# Patient Record
Sex: MALE | Race: WHITE | Employment: FULL TIME | URBAN - METROPOLITAN AREA
[De-identification: names, ages, dates, MRNs, and addresses within clinical notes are randomized per-mention and may not be internally consistent; named-entity substitution may affect disease eponyms.]

---

## 2018-01-12 ENCOUNTER — HOSPITAL ENCOUNTER (EMERGENCY)
Age: 58
Discharge: HOME | End: 2018-01-12
Payer: COMMERCIAL

## 2018-01-12 VITALS
SYSTOLIC BLOOD PRESSURE: 172 MMHG | DIASTOLIC BLOOD PRESSURE: 107 MMHG | OXYGEN SATURATION: 98 % | BODY MASS INDEX: 21 KG/M2 | HEART RATE: 69 BPM | TEMPERATURE: 99.7 F | WEIGHT: 150 LBS | HEIGHT: 71 IN | RESPIRATION RATE: 17 BRPM

## 2018-01-12 DIAGNOSIS — T78.40XA ALLERGIC REACTION, INITIAL ENCOUNTER: Primary | ICD-10-CM

## 2018-01-12 PROCEDURE — 6360000002 HC RX W HCPCS: Performed by: PHYSICIAN ASSISTANT

## 2018-01-12 PROCEDURE — 99282 EMERGENCY DEPT VISIT SF MDM: CPT

## 2018-01-12 PROCEDURE — S0028 INJECTION, FAMOTIDINE, 20 MG: HCPCS | Performed by: PHYSICIAN ASSISTANT

## 2018-01-12 PROCEDURE — 96374 THER/PROPH/DIAG INJ IV PUSH: CPT

## 2018-01-12 PROCEDURE — 2580000003 HC RX 258: Performed by: PHYSICIAN ASSISTANT

## 2018-01-12 PROCEDURE — 2500000003 HC RX 250 WO HCPCS: Performed by: PHYSICIAN ASSISTANT

## 2018-01-12 PROCEDURE — 96375 TX/PRO/DX INJ NEW DRUG ADDON: CPT

## 2018-01-12 RX ORDER — SODIUM CHLORIDE 9 MG/ML
INJECTION, SOLUTION INTRAVENOUS CONTINUOUS
Status: DISCONTINUED | OUTPATIENT
Start: 2018-01-12 | End: 2018-01-12 | Stop reason: HOSPADM

## 2018-01-12 RX ORDER — METHYLPREDNISOLONE SODIUM SUCCINATE 125 MG/2ML
125 INJECTION, POWDER, LYOPHILIZED, FOR SOLUTION INTRAMUSCULAR; INTRAVENOUS ONCE
Status: COMPLETED | OUTPATIENT
Start: 2018-01-12 | End: 2018-01-12

## 2018-01-12 RX ORDER — DIPHENHYDRAMINE HYDROCHLORIDE 50 MG/ML
25 INJECTION INTRAMUSCULAR; INTRAVENOUS ONCE
Status: COMPLETED | OUTPATIENT
Start: 2018-01-12 | End: 2018-01-12

## 2018-01-12 RX ORDER — PREDNISONE 20 MG/1
20 TABLET ORAL DAILY
Qty: 5 TABLET | Refills: 0 | Status: SHIPPED | OUTPATIENT
Start: 2018-01-12 | End: 2018-01-17

## 2018-01-12 RX ORDER — CETIRIZINE HYDROCHLORIDE 10 MG/1
10 TABLET ORAL DAILY
Qty: 7 TABLET | Refills: 0 | Status: SHIPPED | OUTPATIENT
Start: 2018-01-12 | End: 2018-01-19

## 2018-01-12 RX ADMIN — FAMOTIDINE 20 MG: 10 INJECTION, SOLUTION INTRAVENOUS at 06:51

## 2018-01-12 RX ADMIN — DIPHENHYDRAMINE HYDROCHLORIDE 25 MG: 50 INJECTION, SOLUTION INTRAMUSCULAR; INTRAVENOUS at 06:52

## 2018-01-12 RX ADMIN — METHYLPREDNISOLONE SODIUM SUCCINATE 125 MG: 125 INJECTION, POWDER, FOR SOLUTION INTRAMUSCULAR; INTRAVENOUS at 06:51

## 2018-01-12 RX ADMIN — SODIUM CHLORIDE: 9 INJECTION, SOLUTION INTRAVENOUS at 06:52

## 2018-01-12 ASSESSMENT — ENCOUNTER SYMPTOMS
RHINORRHEA: 0
CONSTIPATION: 0
ABDOMINAL PAIN: 0
COLOR CHANGE: 0
EYE DISCHARGE: 0
SHORTNESS OF BREATH: 0
ABDOMINAL DISTENTION: 0
SORE THROAT: 0

## 2018-01-12 NOTE — ED PROVIDER NOTES
allergic reaction as well as sinus and nasal congestion along with prednisone patient was advised to try to by process of elimination think of what is different for him because he allergic reaction is advised that if he continues to come in contact with whatever was causing the reaction after he was off the medications he may begin to react again. He is also advised to follow-up with his regular family physician and return to the emergency department if his symptoms become worse any signs of dizziness shortness of breath or worsening rash. CRITICAL CARE TIME   Total Critical Care time was 0 minutes, excluding separately reportable procedures. There was a high probability of clinically significant/life threatening deterioration in the patient's condition which required my urgent intervention. CONSULTS:  None    PROCEDURES:  Unless otherwise noted below, none     Procedures    FINAL IMPRESSION      1.  Allergic reaction, initial encounter          DISPOSITION/PLAN   DISPOSITION Decision To Discharge 01/12/2018 08:26:31 AM      PATIENT REFERRED TO:  Luke Abbott MD  [address] [facility]  [phone]    In 2 days      Arthur Jeffrey MD  [place] [address]  [phone]    In 3 days        DISCHARGE MEDICATIONS:  New Prescriptions    CETIRIZINE (ZYRTEC) 10 MG TABLET    Take 1 tablet by mouth daily for 7 doses    PREDNISONE (DELTASONE) 20 MG TABLET    Take 1 tablet by mouth daily for 5 doses          (Please note that portions of this note were completed with a voice recognition program.  Efforts were made to edit the dictations but occasionally words are mis-transcribed.)    Evelina Oliver PA-C (electronically signed)  Attending Emergency Physician         Evelina Oliver PA-C  01/12/18 2641

## 2019-02-05 NOTE — ED TRIAGE NOTES
Pt states yesterday he woke up with swollen lips, took a Benadryl yesterday and felt a little better. Pt states this morning he woke up with swollen lips, swollen eyes and a rash all over his body. Rash appears to be large red hives that pt states is itchy. Pt denies difficulty breathing, talking, swallowing. Pt denies swelling in mouth or tongue. Pt alert and oriented, skin p/w/d, respirations even and unlabored. No distress noted in triage.
Unable to assess

## 2022-02-08 ENCOUNTER — HOSPITAL ENCOUNTER (INPATIENT)
Age: 62
LOS: 5 days | Discharge: HOME | DRG: 897 | End: 2022-02-14
Attending: PSYCHIATRY & NEUROLOGY | Admitting: PSYCHIATRY & NEUROLOGY
Payer: COMMERCIAL

## 2022-02-08 DIAGNOSIS — F19.94 SUBSTANCE INDUCED MOOD DISORDER (HCC): Primary | ICD-10-CM

## 2022-02-08 LAB
ACETAMINOPHEN LEVEL: <5 UG/ML (ref 10–30)
ALBUMIN SERPL-MCNC: 4.6 G/DL (ref 3.5–4.6)
ALP BLD-CCNC: 95 U/L (ref 35–104)
ALT SERPL-CCNC: 49 U/L (ref 0–41)
AMPHETAMINE SCREEN, URINE: ABNORMAL
ANION GAP SERPL CALCULATED.3IONS-SCNC: 16 MEQ/L (ref 9–15)
AST SERPL-CCNC: 89 U/L (ref 0–40)
BACTERIA: NEGATIVE /HPF
BARBITURATE SCREEN URINE: ABNORMAL
BASOPHILS ABSOLUTE: 0 K/UL (ref 0–0.2)
BASOPHILS RELATIVE PERCENT: 0.5 %
BENZODIAZEPINE SCREEN, URINE: ABNORMAL
BILIRUB SERPL-MCNC: 0.6 MG/DL (ref 0.2–0.7)
BILIRUBIN URINE: NEGATIVE
BLOOD, URINE: NEGATIVE
BUN BLDV-MCNC: 14 MG/DL (ref 6–20)
CALCIUM SERPL-MCNC: 9.8 MG/DL (ref 8.5–9.9)
CANNABINOID SCREEN URINE: POSITIVE
CHLORIDE BLD-SCNC: 96 MEQ/L (ref 95–107)
CHOLESTEROL, TOTAL: 246 MG/DL (ref 0–199)
CK MB: 10.5 NG/ML (ref 0–6.7)
CLARITY: CLEAR
CO2: 24 MEQ/L (ref 20–31)
COCAINE METABOLITE SCREEN URINE: ABNORMAL
COLOR: YELLOW
CREAT SERPL-MCNC: 0.69 MG/DL (ref 0.7–1.2)
CREATINE KINASE-MB INDEX: 1.4 % (ref 0–3.5)
EOSINOPHILS ABSOLUTE: 0.1 K/UL (ref 0–0.7)
EOSINOPHILS RELATIVE PERCENT: 0.8 %
EPITHELIAL CELLS, UA: ABNORMAL /HPF (ref 0–5)
ETHANOL PERCENT: 0.05 G/DL
ETHANOL PERCENT: 0.09 G/DL
ETHANOL: 106 MG/DL (ref 0–0.08)
ETHANOL: 58 MG/DL (ref 0–0.08)
GFR AFRICAN AMERICAN: >60
GFR NON-AFRICAN AMERICAN: >60
GLOBULIN: 2.9 G/DL (ref 2.3–3.5)
GLUCOSE BLD-MCNC: 110 MG/DL (ref 70–99)
GLUCOSE URINE: NEGATIVE MG/DL
HCT VFR BLD CALC: 45.1 % (ref 42–52)
HDLC SERPL-MCNC: 78 MG/DL (ref 40–59)
HEMOGLOBIN: 15.2 G/DL (ref 14–18)
HYALINE CASTS: ABNORMAL /HPF (ref 0–5)
KETONES, URINE: >=80 MG/DL
LDL CHOLESTEROL CALCULATED: 138 MG/DL (ref 0–129)
LEUKOCYTE ESTERASE, URINE: NEGATIVE
LYMPHOCYTES ABSOLUTE: 2.2 K/UL (ref 1–4.8)
LYMPHOCYTES RELATIVE PERCENT: 28.9 %
Lab: ABNORMAL
MCH RBC QN AUTO: 33.4 PG (ref 27–31.3)
MCHC RBC AUTO-ENTMCNC: 33.8 % (ref 33–37)
MCV RBC AUTO: 99 FL (ref 80–100)
METHADONE SCREEN, URINE: ABNORMAL
MONOCYTES ABSOLUTE: 1.1 K/UL (ref 0.2–0.8)
MONOCYTES RELATIVE PERCENT: 14.8 %
NEUTROPHILS ABSOLUTE: 4.3 K/UL (ref 1.4–6.5)
NEUTROPHILS RELATIVE PERCENT: 55 %
NITRITE, URINE: NEGATIVE
OPIATE SCREEN URINE: ABNORMAL
OXYCODONE URINE: ABNORMAL
PDW BLD-RTO: 14.8 % (ref 11.5–14.5)
PH UA: 5.5 (ref 5–9)
PHENCYCLIDINE SCREEN URINE: ABNORMAL
PLATELET # BLD: 159 K/UL (ref 130–400)
POTASSIUM SERPL-SCNC: 3.4 MEQ/L (ref 3.4–4.9)
PROPOXYPHENE SCREEN: ABNORMAL
PROTEIN UA: 30 MG/DL
RBC # BLD: 4.56 M/UL (ref 4.7–6.1)
RBC UA: ABNORMAL /HPF (ref 0–5)
SALICYLATE, SERUM: <0.3 MG/DL (ref 15–30)
SARS-COV-2, NAAT: NOT DETECTED
SODIUM BLD-SCNC: 136 MEQ/L (ref 135–144)
SPECIFIC GRAVITY UA: 1.03 (ref 1–1.03)
TOTAL CK: 765 U/L (ref 0–190)
TOTAL PROTEIN: 7.5 G/DL (ref 6.3–8)
TRIGL SERPL-MCNC: 149 MG/DL (ref 0–150)
TSH SERPL DL<=0.05 MIU/L-ACNC: 1.3 UIU/ML (ref 0.44–3.86)
URINE REFLEX TO CULTURE: ABNORMAL
UROBILINOGEN, URINE: 1 E.U./DL
WBC # BLD: 7.8 K/UL (ref 4.8–10.8)
WBC UA: ABNORMAL /HPF (ref 0–5)

## 2022-02-08 PROCEDURE — 36415 COLL VENOUS BLD VENIPUNCTURE: CPT

## 2022-02-08 PROCEDURE — 80061 LIPID PANEL: CPT

## 2022-02-08 PROCEDURE — 85025 COMPLETE CBC W/AUTO DIFF WBC: CPT

## 2022-02-08 PROCEDURE — 82550 ASSAY OF CK (CPK): CPT

## 2022-02-08 PROCEDURE — 80053 COMPREHEN METABOLIC PANEL: CPT

## 2022-02-08 PROCEDURE — 87635 SARS-COV-2 COVID-19 AMP PRB: CPT

## 2022-02-08 PROCEDURE — 99285 EMERGENCY DEPT VISIT HI MDM: CPT

## 2022-02-08 PROCEDURE — 81001 URINALYSIS AUTO W/SCOPE: CPT

## 2022-02-08 PROCEDURE — 80143 DRUG ASSAY ACETAMINOPHEN: CPT

## 2022-02-08 PROCEDURE — 80307 DRUG TEST PRSMV CHEM ANLYZR: CPT

## 2022-02-08 PROCEDURE — 84443 ASSAY THYROID STIM HORMONE: CPT

## 2022-02-08 PROCEDURE — 82553 CREATINE MB FRACTION: CPT

## 2022-02-08 PROCEDURE — 80179 DRUG ASSAY SALICYLATE: CPT

## 2022-02-08 PROCEDURE — 82077 ASSAY SPEC XCP UR&BREATH IA: CPT

## 2022-02-08 RX ORDER — LORAZEPAM 1 MG/1
2 TABLET ORAL ONCE
Status: COMPLETED | OUTPATIENT
Start: 2022-02-08 | End: 2022-02-09

## 2022-02-08 RX ORDER — LANOLIN ALCOHOL/MO/W.PET/CERES
100 CREAM (GRAM) TOPICAL ONCE
Status: COMPLETED | OUTPATIENT
Start: 2022-02-08 | End: 2022-02-09

## 2022-02-08 ASSESSMENT — ENCOUNTER SYMPTOMS
ABDOMINAL DISTENTION: 0
VOMITING: 0
ABDOMINAL PAIN: 0
NAUSEA: 0
COUGH: 1
ANAL BLEEDING: 0
APNEA: 0
EYE DISCHARGE: 0
VOICE CHANGE: 0
SHORTNESS OF BREATH: 0
BACK PAIN: 0

## 2022-02-09 PROBLEM — F19.94 SUBSTANCE INDUCED MOOD DISORDER (HCC): Status: ACTIVE | Noted: 2022-02-09

## 2022-02-09 PROCEDURE — 6370000000 HC RX 637 (ALT 250 FOR IP): Performed by: PSYCHIATRY & NEUROLOGY

## 2022-02-09 PROCEDURE — 6370000000 HC RX 637 (ALT 250 FOR IP): Performed by: PHYSICIAN ASSISTANT

## 2022-02-09 PROCEDURE — 99223 1ST HOSP IP/OBS HIGH 75: CPT | Performed by: PSYCHIATRY & NEUROLOGY

## 2022-02-09 PROCEDURE — 1240000000 HC EMOTIONAL WELLNESS R&B

## 2022-02-09 PROCEDURE — 93005 ELECTROCARDIOGRAM TRACING: CPT | Performed by: PSYCHIATRY & NEUROLOGY

## 2022-02-09 RX ORDER — ACETAMINOPHEN 325 MG/1
650 TABLET ORAL EVERY 4 HOURS PRN
Status: DISCONTINUED | OUTPATIENT
Start: 2022-02-09 | End: 2022-02-14 | Stop reason: HOSPADM

## 2022-02-09 RX ORDER — TRAZODONE HYDROCHLORIDE 50 MG/1
50 TABLET ORAL NIGHTLY PRN
Status: DISCONTINUED | OUTPATIENT
Start: 2022-02-09 | End: 2022-02-14 | Stop reason: HOSPADM

## 2022-02-09 RX ORDER — HYDROXYZINE HYDROCHLORIDE 50 MG/ML
50 INJECTION, SOLUTION INTRAMUSCULAR EVERY 6 HOURS PRN
Status: DISCONTINUED | OUTPATIENT
Start: 2022-02-09 | End: 2022-02-14 | Stop reason: HOSPADM

## 2022-02-09 RX ORDER — POLYETHYLENE GLYCOL 3350 17 G/17G
17 POWDER, FOR SOLUTION ORAL DAILY PRN
Status: DISCONTINUED | OUTPATIENT
Start: 2022-02-09 | End: 2022-02-14 | Stop reason: HOSPADM

## 2022-02-09 RX ORDER — LORAZEPAM 1 MG/1
2 TABLET ORAL
Status: DISCONTINUED | OUTPATIENT
Start: 2022-02-09 | End: 2022-02-10

## 2022-02-09 RX ORDER — HALOPERIDOL 5 MG/ML
5 INJECTION INTRAMUSCULAR EVERY 6 HOURS PRN
Status: DISCONTINUED | OUTPATIENT
Start: 2022-02-09 | End: 2022-02-14 | Stop reason: HOSPADM

## 2022-02-09 RX ORDER — HYDROXYZINE PAMOATE 50 MG/1
50 CAPSULE ORAL EVERY 6 HOURS PRN
Status: DISCONTINUED | OUTPATIENT
Start: 2022-02-09 | End: 2022-02-14 | Stop reason: HOSPADM

## 2022-02-09 RX ORDER — HALOPERIDOL 5 MG
5 TABLET ORAL EVERY 6 HOURS PRN
Status: DISCONTINUED | OUTPATIENT
Start: 2022-02-09 | End: 2022-02-14 | Stop reason: HOSPADM

## 2022-02-09 RX ORDER — LORAZEPAM 1 MG/1
3 TABLET ORAL
Status: DISCONTINUED | OUTPATIENT
Start: 2022-02-09 | End: 2022-02-10

## 2022-02-09 RX ORDER — NICOTINE 21 MG/24HR
1 PATCH, TRANSDERMAL 24 HOURS TRANSDERMAL DAILY
Status: DISCONTINUED | OUTPATIENT
Start: 2022-02-09 | End: 2022-02-14 | Stop reason: HOSPADM

## 2022-02-09 RX ORDER — MAGNESIUM HYDROXIDE/ALUMINUM HYDROXICE/SIMETHICONE 120; 1200; 1200 MG/30ML; MG/30ML; MG/30ML
30 SUSPENSION ORAL EVERY 6 HOURS PRN
Status: DISCONTINUED | OUTPATIENT
Start: 2022-02-09 | End: 2022-02-14 | Stop reason: HOSPADM

## 2022-02-09 RX ORDER — LORAZEPAM 2 MG/ML
0.5 INJECTION INTRAMUSCULAR
Status: DISCONTINUED | OUTPATIENT
Start: 2022-02-09 | End: 2022-02-10

## 2022-02-09 RX ORDER — FOLIC ACID 1 MG/1
1 TABLET ORAL DAILY
Status: DISCONTINUED | OUTPATIENT
Start: 2022-02-09 | End: 2022-02-14 | Stop reason: HOSPADM

## 2022-02-09 RX ORDER — LANOLIN ALCOHOL/MO/W.PET/CERES
100 CREAM (GRAM) TOPICAL DAILY
Status: DISCONTINUED | OUTPATIENT
Start: 2022-02-10 | End: 2022-02-14 | Stop reason: HOSPADM

## 2022-02-09 RX ORDER — LORAZEPAM 0.5 MG/1
0.5 TABLET ORAL
Status: DISCONTINUED | OUTPATIENT
Start: 2022-02-09 | End: 2022-02-10

## 2022-02-09 RX ORDER — MULTIVITAMIN WITH IRON
1 TABLET ORAL DAILY
Status: DISCONTINUED | OUTPATIENT
Start: 2022-02-09 | End: 2022-02-14 | Stop reason: HOSPADM

## 2022-02-09 RX ORDER — LORAZEPAM 1 MG/1
1 TABLET ORAL EVERY 4 HOURS PRN
Status: DISCONTINUED | OUTPATIENT
Start: 2022-02-09 | End: 2022-02-10

## 2022-02-09 RX ADMIN — THERA TABS 1 TABLET: TAB at 12:06

## 2022-02-09 RX ADMIN — LORAZEPAM 2 MG: 1 TABLET ORAL at 02:27

## 2022-02-09 RX ADMIN — LORAZEPAM 0.5 MG: 0.5 TABLET ORAL at 12:06

## 2022-02-09 RX ADMIN — Medication 100 MG: at 02:27

## 2022-02-09 RX ADMIN — FOLIC ACID 1 MG: 1 TABLET ORAL at 12:06

## 2022-02-09 ASSESSMENT — SLEEP AND FATIGUE QUESTIONNAIRES
RESTFUL SLEEP: YES
DIFFICULTY STAYING ASLEEP: YES
DO YOU HAVE DIFFICULTY SLEEPING: YES
SLEEP PATTERN: DIFFICULTY FALLING ASLEEP;DIFFICULTY ARISING
AVERAGE NUMBER OF SLEEP HOURS: 6
DIFFICULTY FALLING ASLEEP: YES
DO YOU USE A SLEEP AID: NO
DIFFICULTY ARISING: YES

## 2022-02-09 ASSESSMENT — LIFESTYLE VARIABLES: HISTORY_ALCOHOL_USE: YES

## 2022-02-09 NOTE — GROUP NOTE
Group Therapy Note    Date: 2/9/2022    Group Start Time: 1400  Group End Time: 1430  Group Topic: Psychoeducation    [facility]    DINAH Ivey        Group Therapy Note    Attendees: 11         Patient's Goal:  To participate in Psychoeducational group on sharing special talents. 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 Status After Intervention:  Improved    Participation Level:  Active Listener and Interactive    Participation Quality: Appropriate, Attentive and Sharing      Speech:  normal      Thought Process/Content: Logical      Affective Functioning: Congruent      Mood: Calm      Level of consciousness:  Alert and Attentive      Response to Learning: Able to verbalize current knowledge/experience      Endings: None Reported    Modes of Intervention: Education      Discipline Responsible: /Counselor      Signature:  DINAH Ivey

## 2022-02-09 NOTE — H&P
[facility] - Department of Psychiatry    History and Physical - Adult         CHIEF COMPLAINT:  Depression SI, alcoholism    History obtained from:  patient    Patient was seen after discussing with the treatment team and reviewing the chart        CIRCUMSTANCES OF ADMISSION:     65yo male BIB self for SI no plan. Patient reports stressors as relapsed in drinking, lost his job, and his x-wife  his best friend. Patient reports no plan \"Jagruti never had the thoughts of no living. \" H/O rehab and sober for 15yrs in the past. Patient affect is sad/flat, tearful at times. Patient denies HI and AVH. Endorses high depression and mild anxiety. On unit patient has been calm and cooperative    HISTORY OF PRESENT ILLNESS:      The patient is a 63 y.o. male engaged living with mahin, unemployed recently lost his job, with significant past history of alcoholism    Pt report that he has been feeling depressed and suicidal  Family came to him and asked him to seek help. Pt has been drinking a lot - about 3 of the 1/2 gallon Rum per week. Weekend drink from morning to night. When he was working he drank after work.   Pt started drinking 15 years ago after divorce on and off more so binging  Escalated to daily drinking for 2 years  Some days he goes without drinking  Has been to rehab in [facility] - 5 years ago  Pt is currently going through withdrawal  Never had any seizures    Depression Severity: Rating mood to be around 3/10 (10- good)  Quality:melancholic  Worse all day  Content: Hopeless, worthless and helpless feeling  Suicidal thoughts - passive death wishes- rather be dead, gets worse when he drink more  Associated symptoms:  Poor concentration, anhedonia, decrease motivation  Sleep and appetite- poor    Stressors:losing job, ex wife  his best friend  2 children - 32 and 23 y/o - no close contact, not seen grand kids for a while  The patient is not currently receiving care for the above psychiatric illness. Medications Prior to Admission:   No medications prior to admission. Compliance:no    Psychiatric Review of Systems       Depression: yes     Shirley or Hypomania:  no     Panic Attacks:  yes     Phobias:  no     Obsessions and Compulsions:  no     PTSD : no     Hallucinations:  no     Delusions:  Paranoid ideation    Substance Abuse History:  ETOH: yes as above  Marijuana: no  Opiates: no  Other Drugs: no      Past Psychiatric History:  Prior Diagnosis:  MDD, alcoholism  Psychiatrist: No   Therapist:no   Hospitalization: no  Hx of Suicidal Attempts: no  Hx of violence:  no  ECT: no  Previous discontinued Psychiatric Med Trials: no    Past Medical History:    History reviewed. No pertinent past medical history. Past Surgical History:    History reviewed. No pertinent surgical history. Allergies:   Patient has no known allergies. Family History  History reviewed. No pertinent family history. Social History:  Born and Raised[place]  Describes Childhood:   supportive  Education: [employer]  Employment: Laid off  Relationships:   Children: 2  Current Support: romantic partner    Legal Hx: none  Access to weapons?:  No      EXAMINATION:    REVIEW OF SYSTEMS:    ROS:  [x] All negative/unchanged except if checked.  Explain positive(checked items) below:  [] Constitutional  [] Eyes  [] Ear/Nose/Mouth/Throat  [] Respiratory  [] CV  [] GI  []   [] Musculoskeletal  [] Skin/Breast  [] Neurological  [] Endocrine  [] Heme/Lymph  [] Allergic/Immunologic    Explanation:     Vitals:  BP (!) 181/66   Pulse 69   Temp 97.8 °F (36.6 °C) (Oral)   Resp 18   Ht 5' 10\" (1.778 m)   Wt 163 lb (73.9 kg)   SpO2 97%   BMI 23.39 kg/m²      Neurologic Exam:   Muscle Strength & Tone: full ROM  Gait: normal gait   Involuntary Movements: Yes    Mental Status Examination:    Level of consciousness:  within normal limits   Appearance:  ill-appearing  Behavior/Motor:  psychomotor retardation  Attitude toward examiner:  cooperative  Speech:  slow   Mood: constricted, decreased range and depressed  Affect:  blunted  Thought processes:  slow   Thought content:  Suicidal Ideation:  passive  Delusions:  no evidence of delusions  Perceptual Disturbance:  denies any perceptual disturbance  Cognition:  oriented to person, place, and time   Concentration poor  Memory intact  Insight poor   Judgement poor   Fund of Knowledge adequate    Mini Mental Status 30/30      DIAGNOSIS:     Substance-induced (alc) mood disorder  Alcohol dependence in w/d   R/O MDD single episode severe          RISK ASSESSMENT:    SUICIDE RISK ASSESSMENT: high  HOMICIDE: low  AGITATION/VIOLENCE: low  ELOPEMENT: low    LABS: REVIEWED TODAY:  Recent Labs     02/08/22 2004   WBC 7.8   HGB 15.2        Recent Labs     02/08/22 2004      K 3.4   CL 96   CO2 24   BUN 14   CREATININE 0.69*   GLUCOSE 110*     Recent Labs     02/08/22 2004   BILITOT 0.6   ALKPHOS 95   AST 89*   ALT 49*     Lab Results   Component Value Date    LABAMPH Neg 02/08/2022    BARBSCNU Neg 02/08/2022    LABBENZ Neg 02/08/2022    LABMETH Neg 02/08/2022    OPIATESCREENURINE Neg 02/08/2022    PHENCYCLIDINESCREENURINE Neg 02/08/2022    ETOH 58 02/08/2022     Lab Results   Component Value Date    TSH 1.300 02/08/2022     No results found for: LITHIUM  No results found for: VALPROATE, CBMZ  No results found for: LITHIUM, VALPROATE    FURTHER LABS ORDERED :      Radiology   No results found. EKG: TRACING REVIEWED    TREATMENT PLAN:    Risk Management:  close watch and suicide risk    Collateral Information:  Will obtain collateral information from the family or friends. Will obtain medical records as appropriate from out patient providers  Will consult the hospitalist for a physical exam to rule out any co-morbid physical condition.     Home medication Reconciled       New Medications started during this admission :    See orders  Prn Haldol 5mg and Vistaril 50mg q6hr for extreme agitation. Trazodone as ordered for insomnia  Vistaril as ordered for anxiety  Discussed with the patient risk, benefit, alternative and common side effects for the  proposed medication treatment. Patient is consenting to the treatment.     Psychotherapy:   Encourage participation in milieu and group therapy  Individual therapy as needed      Electronically signed by Kashmir Owens MD on 2/9/2022 at 11:42 AM

## 2022-02-09 NOTE — CARE COORDINATION
FAMILY COLLATERAL NOTE    Family/Support Name: Sara Lopez  Contact #: [phone]  Relationship to Pt[de-identified] Girlfriend        Family/Support contact aware of hospitalization: Yes    Presenting Symptoms/Current Concerns:  Collateral said patient was drinking and threatening to kill himself since 2/3/2022. Collateral and patient's children brought patient to the emergency room. Top 3 Life Stressors:   Patient lost his job. Patient is not able to see his children when he drinks. Patient has is not good at handling stress. Background History Relevant to Current Hospitalization:  Patient was in rehab two years ago in which he was kept for 27 days. Patient was discharged after learning that his insurance would no longer pay. Patient also threatened two weeks ago to kill himself. Patient will quit drinking for 3 months and start again. Family Mental Health/Substance Use History:   Patient's brother has PTSD from serving in the war. Collateral said she was not aware of substance use history due to patient's parents both being . Support Network's Goal for Hospitalization: Collateral wants patient to go to rehab and get psychiatric help to learn how to deal with stuff and have a normal life. Patient does not have insurance according to collateral and may refuse to go to rehab. Discharge Plan: Patient should be discharged to a rehab facility and see a therapist or counselor. Patient needs to reconnect with Alcoholic Anonymous and attend meetings. Support Network Supportive of Discharge Plan: Yes    Support can confirm Safety of Location and Security of Weapons:   Collateral confirmed that there are no weapons in the home. Support agreeable to Safeguard and Monitor Medications (including Prescription and OTC):   Collateral confirmed that that there are no medications in the home.      Identified Barriers to Compliance with Discharge Plan: Patient does not have health insurance to cover stay in hospital or rehab. Recommendations for Support Network:   Patient should be discharged to rehab facility and see a therapist or counselor. Patient should also attend Alcoholic Anonymous meetings.     .Electronically signed by [address] on 2/9/2022 at 5:55 PM      [address]

## 2022-02-09 NOTE — ED NOTES
Appetite good for breakfast. Updated on treatment plan. Verbalizes understanding. Taking PO fluids fair.      Kimberley Hernandez RN  02/09/22 9656
Awaiting final disposition from Dr. Felix Haas has approved a 3WT indigent bed. Resting with eyes closed & no acute distress noted.      Glenys Galindo RN  02/09/22 0036
Breakfast tray given. Voices no complaints.      Milton Currie RN  02/09/22 9615
Corina De from [facility] called and per Corina De the pt has been accepted for an indigent bed on [address].     Shama Vera RN  02/09/22 2692
Mckenna De Dios (family member) [phone]     Justin Curtis RN  02/09/22 3149
[facility] notified for Patient belongings & 3 WT escort.      Kimberley Hernandez RN  02/09/22 1021
Patient changed into psychsafe clothing without incidence. Lab at bedside and draw without issue. Patient calm sitting in chair at this time. No distress noted.        Yolanda Piper RN  02/08/22 2041
Patient in bed, appears to be asleep, respirations even and unlabored. No s/s of distress noted.       Dorys Suh RN  02/09/22 5563
Patient resting quietly respirations are even and unlabored no distress noted at this time.        Elsa Hawley RN  02/09/22 0681
Provisional Diagnosis:      Depression with SI, Substance use d/o    Psychosocial and Contextual Factors:      Patient live with girlcade. Recently lost job and ex-wife  best friend. Per pt kids want \"Nothing to do with me\"  C-SSRS Summary:     Patient: Obey Wright Screening  1) Within the past month, have you wished you were dead or wished you could go to sleep and not wake up? : Yes  2) Have you actually had any thoughts of killing yourself? : Yes  6) Have you ever done anything, started to do anything, or prepared to do anything to end your life?: No    Family: none noted at bedside. Lives with Girlfriend. Agency: None          Abuse Assessment  Physical Abuse: Denies  Verbal Abuse: Denies  Emotional abuse: Denies  Financial Abuse: Denies  Sexual abuse: Denies  Elder abuse: No    Clinical Summary:      65yo male BIB self for SI no plan. Patient reports stressors as relapsed in drinking, lost his job, and his x-wife  his best friend. Patient reports no plan \"Jagruti never had the thoughts of no living. \" H/O rehab and sober for 15yrs in the past. Patient affect is sad/flat, tearful at times. Patient denies HI and AVH. Endorses high depression and mild anxiety. On unit patient has been calm and cooperative. BAL: 0.093  TOX + THC  QTC: 403      Level of Care Disposition:      Per Dr Breanna Elliott refer to dual diagnosis, preferring [facility].         Rafael Barillas RN  02/09/22 0140       Rafael Barillas RN  02/09/22 8991       Rafael Barillas RN  02/09/22 9821
Pt is quiet and cooperative with no problems and no C/O any kind expressed. Pt has no cough no respiratory distress and no SOB noted.      Floyd Paul RN  02/09/22 5229
normal...

## 2022-02-09 NOTE — CARE COORDINATION
Psychosocial Assessment    Current Level of Psychosocial Functioning     Independent X  Dependent    Minimal Assist     Comments:      Psychosocial High Risk Factors (check all that apply)    Unable to obtain meds   Chronic illness/pain    Substance abuse X  Lack of Family Support   Financial stress   Isolation   Inadequate Community Resources  Suicide attempt(s)  Not taking medications   Victim of crime   Developmental Delay  Unable to manage personal needs    Age 72 or older   Homeless  No transportation   Readmission within 30 days  Unemployment X  Traumatic Event    Family/Supports identified: Girlfriend and children. Sexual Orientation:  Heterosexual.    Patient Strengths: Communication, motivated, no significant physical illness and positive support. Patient Barriers: None reported. Safety plan: Completed. CMHC/MH history:   None reported. Plan of Care:    medication management, group/individual therapies, family meetings, psycho -education, treatment team meetings to assist with stabilization    Initial Discharge Plan:  Patient plans to return home, self detox and attend AA meetings. Clinical Summary:      Patient is a 63year old male that reported during  assessment that he had recently lost his job and started drinking again. Patient said he was not able to just have one drink. Patient said he is here because his children wanted him to get help. Patient said he plans to detox on his own once he is discharged. [facility] was discussed. Patient declined and said he has stopped drinking before and he can do it again. Patient plans to attend AA meeting upon being released. Patient stopped going to [address] due to Covid. During assessment, patient made good eye contact and was cooperative and friendly. Patient has the support of his girlfriend and his children. Collateral's phone number is needed.  Patient does not have his cell phone where his phone numbers are located. Safety plan was completed.        .Electronically signed by [address] on 2/9/2022 at 1:38 PM

## 2022-02-09 NOTE — GROUP NOTE
Group Therapy Note    Date: 2/9/2022    Group Start Time: 1500  Group End Time: 6907  Group Topic: Healthy Living/Wellness    [facility]    Julia Fontenot RN; Dot Birmingham RN        Group Therapy Note    Attendees: 10/16         Patient's Goal:  To be able to identify when feeling stressed and learn stress management techniques     Status After Intervention:  Improved    Participation Level:  Active Listener and Interactive    Participation Quality: Appropriate, Attentive and Sharing      Speech:  normal      Thought Process/Content: Logical  Linear      Affective Functioning: Blunted      Mood: euthymic      Level of consciousness:  Alert, Oriented x4 and Attentive      Response to Learning: Able to verbalize current knowledge/experience and Able to verbalize/acknowledge new learning      Endings: None Reported    Modes of Intervention: Education, Support and Problem-solving      Discipline Responsible: Registered Nurse      Signature:  Julia Fontenot RN

## 2022-02-09 NOTE — PROGRESS NOTES
Patient is put on the CIWA protocol scoring a 5 given 0.5 ativan due to patient being somewhat tremulous and shaky, some anxiety, high BP. Patient eats lunch and then goes to lay down in room. Patient will be monitored q.4h. on CIWA protocol.  Electronically signed by Hitesh Melton LPN on 3/3/7045 at 26:50 PM

## 2022-02-09 NOTE — CARE COORDINATION
Brief Intervention and Referral to Treatment Summary    Patient was provided PHQ-9, AUDIT and DAST Screening:      PHQ-9 Score: No score  AUDIT Score:  39  DAST Score:  0    Patients substance use is considered     Low Risk/Healthy  Moderate Risk  Harmful  Dependent X    Patients depression is considered:     Minimal X  Mild   Moderate  Moderately Severe  Severe    Brief Education Was Provided N/A    Patient was receptive  Patient was not receptive      Brief Intervention Is Provided (Only for AUDIT or DAST)     Patient reports readiness to decrease and/or stop use and a plan was discussed   Patient denies readiness to decrease and/or stop use and a plan was not discussed      Recommendations/Referrals for Brief and/or Specialized Treatment Provided to Patient   Patient denied drug use. Patient admitted to substance use. Patient refused referral to [facility]. Patient plans to detox on his own and attend AA meetings.

## 2022-02-09 NOTE — PROGRESS NOTES
Pt arrived from [facility] via wheelchair, accompanied by two staff. Pt ambulated to room without difficulty. Skin check completed by two R.N.'s.  No skin issues noted. Contraband check completed. Toiletries and water placed in pt. room. Oriented  pt to the unit. .  Pt oriented to place, time, situation,  .

## 2022-02-09 NOTE — PROGRESS NOTES
Pt out, social with select peers, attending groups, and talking on phone with significant other. Denies SI, HI, AVH, and rates anxiety 5/10, depression 5/10. CIWA score (0) at 1600. Calm, cooperative with staff today. Currently resting in room.

## 2022-02-09 NOTE — ED TRIAGE NOTES
The patient came to the ED for SI along with daily alcohol intake. Pt states he drinks \"three 1/2 gallon bottles weekly. \" Pt states he recently lost his job and is under a lot of stress. No active plan in place. Respirations even and unlabored. No tremors noted.

## 2022-02-09 NOTE — PROGRESS NOTES
Pt seen by Dr Laverne Beth today. CIWA implemented. Scored a (5), Ativan 0.5 po given by Georges Piedra L.P.N. Pt calm, cooperative with staff. Juniories BIANCA, HI, AVH. Anxiety 8/10, depression 5/10.   Currently in day room having afternoon meal.

## 2022-02-09 NOTE — PROGRESS NOTES
Patient had a flat affect but he was pleasant, cooperative and agreeable to do his leisure assessment. Patient stated he feels he can be depressed and stressed. Stressors are job loss, no medical insurance, finances and his ex wife  his best friend. Patient has been drinking a lot of alcohol and more on the weekend since he relapse a year or so ago. He lives with his fiancee and she is supportive. His sleeping and his appetite fluctuates when he is drinking alcohol. He denies being suicidal but stated he does not remember what happens or what he said because he was intoxicated. He denies using drugs. He denies any auditory or visual hallucinations. He stated he wants help and is hopeful about his recovery. He enjoys watching TV and cooking with his fiancee.  Electronically signed by Ernie Do [address] on 2/9/2022 at 3:20 PM

## 2022-02-09 NOTE — PROGRESS NOTES
Behavioral Services  Medicare Certification Upon Admission    I certify that this patient's inpatient psychiatric hospital admission is medically necessary for:    [x] (1) Treatment which could reasonably be expected to improve this patient's condition,       [x] (2) Or for diagnostic study;     AND     [x](2) The inpatient psychiatric services are provided while the individual is under the care of a physician and are included in the individualized plan of care.     Estimated length of stay/service 3-5 days    Plan for post-hospital care OP care    Electronically signed by Anuradha Bhardwaj MD on 2/9/2022 at 11:41 AM

## 2022-02-09 NOTE — ED PROVIDER NOTES
[facility]  eMERGENCY dEPARTMENT eNCOUnter      Pt Name: Joe Escobar  MRN: [id number]  [place] 1960  Date of evaluation: 2/8/2022  Provider: Beth Jones PA-C    CHIEF COMPLAINT       Chief Complaint   Patient presents with   [address] Problem     Pt having feelings of SI for the past few days    Other     daily ETOH; pt states he drinks \"three 1/2 gallons of rum per week\"; last drink was within the hour         HISTORY OF PRESENT ILLNESS   (Location/Symptom, Timing/Onset,Context/Setting, Quality, Duration, Modifying Factors, Severity)  Note limiting factors. Joe Escobar is a 63 y.o. male who presents to the emergency department patient has suicidal ideation for the past few days he has had over 2 weeks of stressors including job loss notes he drinks approximately half a gallon syndrome per week has detoxed before denies any cuts bruises denies headache nausea vomiting chest pain shortness of breath does have slight cough notes he does smoke 1 pack a day. He does take multivitamins over-the-counter. Symptoms are moderate severity nothing improves symptoms nothing worsens symptoms. HPI    NursingNotes were reviewed. REVIEW OF SYSTEMS    (2-9 systems for level 4, 10 or more for level 5)     Review of Systems   Constitutional: Negative for activity change, appetite change and unexpected weight change. HENT: Negative for ear discharge, nosebleeds and voice change. Eyes: Negative for discharge. Respiratory: Positive for cough. Negative for apnea and shortness of breath. Cardiovascular: Negative for chest pain. Gastrointestinal: Negative for abdominal distention, abdominal pain, anal bleeding, nausea and vomiting. Genitourinary: Negative for dysuria and hematuria. Musculoskeletal: Negative for back pain, joint swelling, neck pain and neck stiffness. Skin: Negative for pallor. Neurological: Negative for facial asymmetry and weakness.    Hematological: Does not bruise/bleed easily. Psychiatric/Behavioral: Positive for suicidal ideas. Negative for behavioral problems and self-injury. All other systems reviewed and are negative. Except as noted above the remainder of the review of systems was reviewed and negative. PAST MEDICAL HISTORY   History reviewed. No pertinent past medical history. SURGICALHISTORY     History reviewed. No pertinent surgical history. CURRENT MEDICATIONS       There are no discharge medications for this patient. Patient has no known allergies. FAMILY HISTORY     History reviewed. No pertinent family history. SOCIAL HISTORY       Social History     Socioeconomic History    Marital status: Single     Spouse name: None    Number of children: None    Years of education: None    Highest education level: None   Occupational History    None   Tobacco Use    Smoking status: Current Every Day Smoker     Packs/day: 1.00     Years: 20.00     Pack years: 20.00     Types: Cigarettes    Smokeless tobacco: Never Used   Vaping Use    Vaping Use: Never used   Substance and Sexual Activity    Alcohol use: Yes     Comment: daily rum    Drug use: No    Sexual activity: None   Other Topics Concern    None   Social History Narrative    None     Social Determinants of Health     Financial Resource Strain:     Difficulty of Paying Living Expenses: Not on file   Food Insecurity:     Worried About Running Out of Food in the Last Year: Not on file    [place] of Food in the Last Year: Not on file   Transportation Needs:     Lack of Transportation (Medical): Not on file    Lack of Transportation (Non-Medical):  Not on file   Physical Activity:     Days of Exercise per Week: Not on file    Minutes of Exercise per Session: Not on file   Stress:     Feeling of Stress : Not on file   Social Connections:     Frequency of Communication with Friends and Family: Not on file    Frequency of Social Gatherings with Friends and Family: Not on file    Attends Christianity Services: Not on file    Active Member of Clubs or Organizations: Not on file    Attends Club or Organization Meetings: Not on file    Marital Status: Not on file   Intimate Partner Violence:     Fear of Current or Ex-Partner: Not on file    Emotionally Abused: Not on file    Physically Abused: Not on file    Sexually Abused: Not on file   Housing Stability:     Unable to Pay for Housing in the Last Year: Not on file    Number of [place] in the Last Year: Not on file    Unstable Housing in the Last Year: Not on file       SCREENINGS   Mather Coma Scale  Eye Opening: Spontaneous  Best Verbal Response: Oriented  Best Motor Response: Obeys commands  Mather Coma Scale Score: 15  Ebola Virus Disease (EVD) Screening   Temp: 97.6 °F (36.4 °C)  CIWA Assessment  BP: (!) 146/107  Pulse: 69  Nausea and Vomiting: no nausea and no vomiting  Tactile Disturbances: none  Tremor: 2  Auditory Disturbances: not present  Paroxysmal Sweats: no sweat visible  Visual Disturbances: not present  Anxiety: 3  Headache, Fullness in Head: none present  Agitation: normal activity  Orientation and Clouding of Sensorium: oriented and can do serial additions  CIWA-Ar Total: 5    PHYSICAL EXAM    (up to 7 for level 4, 8 or more for level 5)     ED Triage Vitals   BP Temp Temp Source Pulse Resp SpO2 Height Weight   02/08/22 2018 02/08/22 1942 02/08/22 1942 02/08/22 1942 02/08/22 1942 02/08/22 1942 02/08/22 1942 02/08/22 1942   (!) 140/92 98.4 °F (36.9 °C) Oral 89 20 96 % 5' 10\" (1.778 m) 163 lb (73.9 kg)       Physical Exam  Vitals and nursing note reviewed. Constitutional:       General: He is not in acute distress. Appearance: He is well-developed. HENT:      Head: Normocephalic and atraumatic. Right Ear: External ear normal.      Left Ear: External ear normal. There is no impacted cerumen.       Nose: Nose normal.      Mouth/Throat:      Mouth: Mucous membranes are moist. Pharynx: No oropharyngeal exudate or posterior oropharyngeal erythema. Eyes:      General:         Right eye: No discharge. Left eye: No discharge. Extraocular Movements: Extraocular movements intact. Pupils: Pupils are equal, round, and reactive to light. Cardiovascular:      Rate and Rhythm: Normal rate and regular rhythm. Pulses: Normal pulses. Heart sounds: Normal heart sounds. Pulmonary:      Effort: Pulmonary effort is normal. No respiratory distress. Breath sounds: Normal breath sounds. No stridor. Abdominal:      General: Bowel sounds are normal. There is no distension. Palpations: Abdomen is soft. Tenderness: There is no abdominal tenderness. Musculoskeletal:         General: Normal range of motion. Cervical back: Normal range of motion and neck supple. Skin:     General: Skin is warm. Findings: No erythema. Neurological:      Mental Status: He is alert and oriented to person, place, and time. Motor: No weakness.       Gait: Gait normal.   Psychiatric:         Mood and Affect: Mood normal.         RESULTS     EKG: All EKG's are interpreted by the Emergency Department Physician who either signs or Co-signsthis chart in the absence of a cardiologist.  EKG normal sinus rhythm rate 65 FL interval 172 ms QRS 74 ms  ms PRT axes by       RADIOLOGY:   Non-plain filmimages such as CT, Ultrasound and MRI are read by the radiologist. Plain radiographic images are visualized and preliminarily interpreted by the emergency physician with the below findings:         Interpretation per the Radiologist below, if available at the time ofthis note:    No orders to display         ED BEDSIDE ULTRASOUND:   Performed by ED Physician - none    LABS:  Labs Reviewed   ACETAMINOPHEN LEVEL - Abnormal; Notable for the following components:       Result Value    Acetaminophen Level <5 (*)     All other components within normal limits   CBC WITH AUTO DIFFERENTIAL - Abnormal; Notable for the following components:    RBC 4.56 (*)     MCH 33.4 (*)     RDW 14.8 (*)     Monocytes Absolute 1.1 (*)     All other components within normal limits   CK - Abnormal; Notable for the following components: Total  (*)     All other components within normal limits   COMPREHENSIVE METABOLIC PANEL - Abnormal; Notable for the following components:    Anion Gap 16 (*)     Glucose 110 (*)     CREATININE 0.69 (*)     ALT 49 (*)     AST 89 (*)     All other components within normal limits   LIPID PANEL - Abnormal; Notable for the following components:    Cholesterol, Total 246 (*)     HDL 78 (*)     LDL Calculated 138 (*)     All other components within normal limits   SALICYLATE LEVEL - Abnormal; Notable for the following components:    Salicylate, Serum <5.7 (*)     All other components within normal limits   URINE DRUG SCREEN - Abnormal; Notable for the following components:    Cannabinoid Scrn, Ur POSITIVE (*)     All other components within normal limits   URINE RT REFLEX TO CULTURE - Abnormal; Notable for the following components:    Ketones, Urine >=80 (*)     Protein, UA 30 (*)     All other components within normal limits   MICROSCOPIC URINALYSIS - Abnormal; Notable for the following components:    RBC, UA 3-5 (*)     All other components within normal limits   CKMB & RELATIVE PERCENT - Abnormal; Notable for the following components:    CK-MB 10.5 (*)     All other components within normal limits   COVID-19, RAPID   ETHANOL   TSH WITHOUT REFLEX   ETHANOL       All other labs were within normal range or not returned as of this dictation.     EMERGENCY DEPARTMENT COURSE and DIFFERENTIAL DIAGNOSIS/MDM:   Vitals:    Vitals:    02/11/22 0930 02/11/22 0931 02/11/22 1052 02/11/22 1403   BP: (!) 131/101 (!) 142/95 (!) 134/94 (!) 146/107   Pulse: 75 79 70 69   Resp: 20  18 18   Temp: 97.6 °F (36.4 °C)      TempSrc: Oral      SpO2: 97%  98% 99%   Weight:       Height:

## 2022-02-10 LAB
EKG ATRIAL RATE: 82 BPM
EKG P AXIS: 28 DEGREES
EKG P-R INTERVAL: 160 MS
EKG Q-T INTERVAL: 378 MS
EKG QRS DURATION: 76 MS
EKG QTC CALCULATION (BAZETT): 441 MS
EKG R AXIS: 47 DEGREES
EKG T AXIS: 85 DEGREES
EKG VENTRICULAR RATE: 82 BPM

## 2022-02-10 PROCEDURE — 93010 ELECTROCARDIOGRAM REPORT: CPT | Performed by: INTERNAL MEDICINE

## 2022-02-10 PROCEDURE — 6370000000 HC RX 637 (ALT 250 FOR IP): Performed by: PSYCHIATRY & NEUROLOGY

## 2022-02-10 PROCEDURE — 99232 SBSQ HOSP IP/OBS MODERATE 35: CPT | Performed by: PSYCHIATRY & NEUROLOGY

## 2022-02-10 PROCEDURE — 1240000000 HC EMOTIONAL WELLNESS R&B

## 2022-02-10 RX ORDER — LORAZEPAM 2 MG/ML
3 INJECTION INTRAMUSCULAR
Status: DISCONTINUED | OUTPATIENT
Start: 2022-02-10 | End: 2022-02-10

## 2022-02-10 RX ORDER — LORAZEPAM 1 MG/1
3 TABLET ORAL
Status: DISCONTINUED | OUTPATIENT
Start: 2022-02-10 | End: 2022-02-10

## 2022-02-10 RX ORDER — LORAZEPAM 1 MG/1
1 TABLET ORAL EVERY 4 HOURS PRN
Status: DISCONTINUED | OUTPATIENT
Start: 2022-02-10 | End: 2022-02-14

## 2022-02-10 RX ORDER — LORAZEPAM 2 MG/ML
3 INJECTION INTRAMUSCULAR
Status: DISCONTINUED | OUTPATIENT
Start: 2022-02-10 | End: 2022-02-14

## 2022-02-10 RX ORDER — CHLORDIAZEPOXIDE HYDROCHLORIDE 25 MG/1
75 CAPSULE, GELATIN COATED ORAL ONCE
Status: COMPLETED | OUTPATIENT
Start: 2022-02-10 | End: 2022-02-10

## 2022-02-10 RX ORDER — LORAZEPAM 2 MG/ML
4 INJECTION INTRAMUSCULAR
Status: DISCONTINUED | OUTPATIENT
Start: 2022-02-10 | End: 2022-02-14

## 2022-02-10 RX ORDER — LORAZEPAM 1 MG/1
3 TABLET ORAL
Status: DISCONTINUED | OUTPATIENT
Start: 2022-02-10 | End: 2022-02-14

## 2022-02-10 RX ORDER — LORAZEPAM 2 MG/ML
2 INJECTION INTRAMUSCULAR
Status: DISCONTINUED | OUTPATIENT
Start: 2022-02-10 | End: 2022-02-10

## 2022-02-10 RX ORDER — LORAZEPAM 2 MG/ML
4 INJECTION INTRAMUSCULAR
Status: DISCONTINUED | OUTPATIENT
Start: 2022-02-10 | End: 2022-02-10

## 2022-02-10 RX ORDER — LORAZEPAM 1 MG/1
1 TABLET ORAL
Status: DISCONTINUED | OUTPATIENT
Start: 2022-02-10 | End: 2022-02-10

## 2022-02-10 RX ORDER — LORAZEPAM 2 MG/ML
2 INJECTION INTRAMUSCULAR
Status: DISCONTINUED | OUTPATIENT
Start: 2022-02-10 | End: 2022-02-14

## 2022-02-10 RX ORDER — LORAZEPAM 1 MG/1
2 TABLET ORAL
Status: DISCONTINUED | OUTPATIENT
Start: 2022-02-10 | End: 2022-02-10

## 2022-02-10 RX ORDER — LORAZEPAM 1 MG/1
2 TABLET ORAL
Status: DISCONTINUED | OUTPATIENT
Start: 2022-02-10 | End: 2022-02-14

## 2022-02-10 RX ORDER — LORAZEPAM 2 MG/ML
1 INJECTION INTRAMUSCULAR
Status: DISCONTINUED | OUTPATIENT
Start: 2022-02-10 | End: 2022-02-10

## 2022-02-10 RX ORDER — LORAZEPAM 0.5 MG/1
0.5 TABLET ORAL EVERY 4 HOURS PRN
Status: DISCONTINUED | OUTPATIENT
Start: 2022-02-10 | End: 2022-02-14

## 2022-02-10 RX ORDER — LORAZEPAM 2 MG/ML
0.5 INJECTION INTRAMUSCULAR EVERY 4 HOURS PRN
Status: DISCONTINUED | OUTPATIENT
Start: 2022-02-10 | End: 2022-02-14

## 2022-02-10 RX ORDER — LORAZEPAM 1 MG/1
4 TABLET ORAL
Status: DISCONTINUED | OUTPATIENT
Start: 2022-02-10 | End: 2022-02-10

## 2022-02-10 RX ORDER — LORAZEPAM 2 MG/ML
1 INJECTION INTRAMUSCULAR EVERY 4 HOURS PRN
Status: DISCONTINUED | OUTPATIENT
Start: 2022-02-10 | End: 2022-02-14

## 2022-02-10 RX ORDER — LORAZEPAM 1 MG/1
4 TABLET ORAL
Status: DISCONTINUED | OUTPATIENT
Start: 2022-02-10 | End: 2022-02-14

## 2022-02-10 RX ADMIN — CHLORDIAZEPOXIDE HYDROCHLORIDE 75 MG: 25 CAPSULE ORAL at 11:04

## 2022-02-10 RX ADMIN — FOLIC ACID 1 MG: 1 TABLET ORAL at 08:32

## 2022-02-10 RX ADMIN — LORAZEPAM 0.5 MG: 0.5 TABLET ORAL at 17:06

## 2022-02-10 RX ADMIN — THERA TABS 1 TABLET: TAB at 08:33

## 2022-02-10 RX ADMIN — LORAZEPAM 0.5 MG: 0.5 TABLET ORAL at 08:33

## 2022-02-10 RX ADMIN — Medication 100 MG: at 08:32

## 2022-02-10 NOTE — PROGRESS NOTES
Patient is given a one time dose of 75 mg of librium and BP will be rechecked later. Patient asking, Edith Singh this just be from anxiety, I really screwed up. \" Patient assured that librium works well on BP, and ofcourse, anxiety can be circumstantial as well.  Electronically signed by Rick Kim LPN on 8/13/4884 at 73:17 AM

## 2022-02-10 NOTE — PROGRESS NOTES
Reported most recent blood pressure of 147/98 to Dr. Donnie Leo.  aware.  Electronically signed by Rick Payne RN on 2/10/22 at 1:02 PM EST

## 2022-02-10 NOTE — GROUP NOTE
Group Therapy Note    Date: 2/9/2022    Group Start Time: 5488  Group End Time: 1800  Group Topic: Healthy Living/Wellness    [facility]    Rufino Palacios        Group Therapy Note    Attendees: 7/18         Patient's Goal:  To learn about and practice healthy coping skills. Notes:  Patient participated in healthy living group.     Status After Intervention:  Unchanged    Participation Level: Interactive    Participation Quality: Appropriate and Attentive      Speech:  normal      Thought Process/Content: Logical      Affective Functioning: Congruent      Mood: euthymic      Level of consciousness:  Alert and Attentive      Response to Learning: Progressing to goal      Endings: None Reported    Modes of Intervention: Education      Discipline Responsible: [address] [employer] [address] [employer]      Signature:  Rufino Palacios

## 2022-02-10 NOTE — PROGRESS NOTES
[facility] FOLLOW-UP NOTE       2/10/2022     Patient was seen and examined in person, Chart reviewed   Patient's case discussed with staff/team    Chief Complaint: Depression Alcoholism    Interim History:     Patient report feeling depressed with anhedonia  He is still going through withdrawal symptoms  His blood pressure was high this morning and it got better after receiving 75 mg Librium  Patient is on CIWA protocol  Patient report feeling hopeless and worthless   Passive death wishes without any plans  Patient denies any audiovisual hallucinations or paranoid thoughts  He is alert and oriented x3  Appetite:   [] Normal/Unchanged  [] Increased  [x] Decreased      Sleep:       [] Normal/Unchanged  [] Fair       [x] Poor              Energy:    [] Normal/Unchanged  [] Increased  [x] Decreased        SI [x] Present  [] Absent    HI  []Present  [] Absent     Aggression:  [] yes  [] no    Patient is [] able  [x] unable to CONTRACT FOR SAFETY     PAST MEDICAL/PSYCHIATRIC HISTORY:   History reviewed. No pertinent past medical history. FAMILY/SOCIAL HISTORY:  History reviewed. No pertinent family history.   Social History     Socioeconomic History    Marital status: Single     Spouse name: Not on file    Number of children: Not on file    Years of education: Not on file    Highest education level: Not on file   Occupational History    Not on file   Tobacco Use    Smoking status: Current Every Day Smoker     Packs/day: 1.00     Years: 20.00     Pack years: 20.00     Types: Cigarettes    Smokeless tobacco: Never Used   Vaping Use    Vaping Use: Never used   Substance and Sexual Activity    Alcohol use: Yes     Comment: daily rum    Drug use: No    Sexual activity: Not on file   Other Topics Concern    Not on file   Social History Narrative    Not on file     Social Determinants of Health     Financial Resource Strain:     Difficulty of Paying Living Expenses: Not on PRN **OR** LORazepam (ATIVAN) tablet 3 mg, 3 mg, Oral, Q1H PRN **OR** LORazepam (ATIVAN) injection 3 mg, 3 mg, IntraMUSCular, Q1H PRN **OR** LORazepam (ATIVAN) tablet 4 mg, 4 mg, Oral, Q1H PRN **OR** LORazepam (ATIVAN) injection 4 mg, 4 mg, IntraMUSCular, Q1H PRN, Zulema Jimenes MD    acetaminophen (TYLENOL) tablet 650 mg, 650 mg, Oral, Q4H PRN, Zulema Jimenes MD    polyethylene glycol (GLYCOLAX) packet 17 g, 17 g, Oral, Daily PRN, Zulema Jimenes MD    traZODone (DESYREL) tablet 50 mg, 50 mg, Oral, Nightly PRN, Zulema Jimenes MD    aluminum & magnesium hydroxide-simethicone (MAALOX) 200-200-20 MG/5ML suspension 30 mL, 30 mL, Oral, Q6H PRN, Zulema Jimenes MD    nicotine (NICODERM CQ) 21 MG/24HR 1 patch, 1 patch, TransDERmal, Daily, Zulema Jimenes MD, 1 patch at 02/10/22 4897    hydrOXYzine (VISTARIL) capsule 50 mg, 50 mg, Oral, Q6H PRN **OR** hydrOXYzine (VISTARIL) injection 50 mg, 50 mg, IntraMUSCular, Q6H PRN, Zulema Jimenes MD    haloperidol (HALDOL) tablet 5 mg, 5 mg, Oral, Q6H PRN **OR** haloperidol lactate (HALDOL) injection 5 mg, 5 mg, IntraMUSCular, Q6H PRN, Zulema Jimenes MD    multivitamin 1 tablet, 1 tablet, Oral, Daily, Zulema Jimenes MD, 1 tablet at 02/10/22 4972    thiamine tablet 100 mg, 100 mg, Oral, Daily, Zulema Jimenes MD, 100 mg at 88/17/53 8160    folic acid (FOLVITE) tablet 1 mg, 1 mg, Oral, Daily, Zulema Jimenes MD, 1 mg at 02/10/22 9940      Examination:  BP (!) 136/110   Pulse 82   Temp 98.1 °F (36.7 °C)   Resp 18   Ht 5' 10\" (1.778 m)   Wt 163 lb (73.9 kg)   SpO2 98%   BMI 23.39 kg/m²   Gait - steady  Medication side effects(SE): no    Mental Status Examination:    Level of consciousness:  within normal limits   Appearance:  poor grooming and poor hygiene  Behavior/Motor:  psychomotor retardation  Attitude toward examiner:  withdrawn  Speech:  slow   Mood: anxious, constricted and depressed  Affect:  blunted  Thought processes:  slow Thought content:  Suicidal Ideation:  passive  Delusions:  no evidence of delusions  Perceptual Disturbance:  denies any perceptual disturbance  Cognition:  oriented to person, place, and time   Concentration intact  Insight fair   Judgement fair     ASSESSMENT:   Patient symptoms are:  [] Well controlled  [x] Improving  [] Worsening  [] No change       Diagnosis:   Active Problems:    Substance induced mood disorder ([facility] [address]  Resolved Problems:    * No resolved hospital problems. *  MDD single episode severe    LABS:    Recent Labs     02/08/22 2004   WBC 7.8   HGB 15.2        Recent Labs     02/08/22 2004      K 3.4   CL 96   CO2 24   BUN 14   CREATININE 0.69*   GLUCOSE 110*     Recent Labs     02/08/22 2004   BILITOT 0.6   ALKPHOS 95   AST 89*   ALT 49*     Lab Results   Component Value Date    LABAMPH Neg 02/08/2022    BARBSCNU Neg 02/08/2022    LABBENZ Neg 02/08/2022    LABMETH Neg 02/08/2022    OPIATESCREENURINE Neg 02/08/2022    PHENCYCLIDINESCREENURINE Neg 02/08/2022    ETOH 58 02/08/2022     Lab Results   Component Value Date    TSH 1.300 02/08/2022     No results found for: LITHIUM  No results found for: VALPROATE, CBMZ    RISK ASSESSMENT: high risk of suicide    Treatment Plan:  Reviewed current Medications with the patient. Medication as ordered  Risks, benefits, side effects, drug-to-drug interactions and alternatives to treatment were discussed. Collateral information:   CD evaluation  Encourage patient to attend group and other milieu activities.   Discharge planning discussed with the patient and treatment team.    PSYCHOTHERAPY/COUNSELING:  [x] Therapeutic interview  [x] Supportive  [] CBT  [] Ongoing  [] Other    [x] Patient continues to need, on a daily basis, active treatment furnished directly by or requiring the supervision of inpatient psychiatric personnel      Anticipated Length of stay:            Electronically signed by Joycelyn Lopez MD on 2/10/2022 at 4:20 PM

## 2022-02-10 NOTE — PROGRESS NOTES
Patient visible on unit for breakfast. Pt pleasant and cooperative. Reactive affect. Pt smiles at this nurse. Pt reports sleep and appetite are \"ok. \" Pt reports he has an alcohol problem and he relapsed. Pt states he has been drinking 2-4 bottles of liqueur per week. Pt being monitored on [facility] protocol for withdrawal. Pt reports he wants to quit drinking, has been involved with AA, and has a positive support system. Pt states his motivation is his kids and grandchildren. Pt states his mood is stable, but he gets depressed when drinking and has suicidal thoughts. Pt states he never thinks of suicide when sober. Pt denies past suicide attempts. Pt denies SI, HI, and Hallucinations. Pt reports his anxiety comes and goes.  Electronically signed by Hanny Resendiz RN on 2/10/22 at 9:09 AM EST

## 2022-02-10 NOTE — GROUP NOTE
Group Therapy Note    Date: 2/10/2022    Group Start Time: 1000  Group End Time: 1050  Group Topic: Psychoeducation    [facility] MAURICE Goff        Group Therapy Note    Attendees: 9         Patient's Goal:  \"To make it through the day\"    Notes:  Patient attended the 1000 skills group. Patient was quiet, kept to himself and he work fairly on his task in group.     Status After Intervention:  Unchanged    Participation Level: Fair    Participation Quality: Appropriate      Speech:  quiet      Thought Process/Content: Linear      Affective Functioning: Flat      Mood: calm      Level of consciousness:  Alert      Response to Learning: Progressing to goal      Endings: None Reported    Modes of Intervention: Education, Socialization and Activity      Discipline Responsible: Psychoeducational Specialist      Signature:  Danyelle Goff

## 2022-02-10 NOTE — PROGRESS NOTES
Spiritual Support Group Note    Number of Participants in Group: 6                       Time: 15:15-15:45    Goal: Relief from isolation and loneliness             Susan Sharing             Self-understanding and gain insight              Acceptance and belonging            Recognize they are not alone                Socialization             Empowerment       Encouragement    Topic:  [x] Spiritual Wellness and Self Care                  [] Hope                     [] Connecting with Divine/Others        [] Thankfulness and Gratitude               [x]  Meaningfulness and Purpose               [] Forgiveness               [] Peace               [] Connect to [employer]      [] Other    Participation Level:   [x] Active Listener   [] Minimal   [] Monopolizing   [x] Interactive   [] No Participation   []  Other:     Attention:   [x] Alert   [] Distractible   [] Drowsy   [] Poor   [] Other:    Manner:   [x] Cooperative   [] Suspicious   [] Withdrawn   [] Guarded   [] Irritable   [] Inhospitable   [] Other:     Others Comments from Group:

## 2022-02-10 NOTE — GROUP NOTE
Group Therapy Note    Date: 2/10/2022    Group Start Time: 1300  Group End Time: 2331  Group Topic: Healthy Living/Wellness    [facility]    Eloy Luo RN        Group Therapy Note    Attendees: 11         Patient's Goal:  To explore the role of positive thinking and gratitude in mood management    Notes:  Pt actively listened and  participated    Status After Intervention:  Unchanged    Participation Level:  Active Listener and Interactive    Participation Quality: Appropriate, Attentive and Sharing      Speech:  normal      Thought Process/Content: Logical  Linear      Affective Functioning: Congruent      Mood: euthymic      Level of consciousness:  Alert and Oriented x4      Response to Learning: Able to verbalize current knowledge/experience, Able to verbalize/acknowledge new learning and Able to retain information      Endings: None Reported    Modes of Intervention: Education, Support, Socialization, Exploration and Clarifying      Discipline Responsible: Registered Nurse      Signature:  Eloy Luo RN

## 2022-02-10 NOTE — PROGRESS NOTES
Patient continued on the CIWA. Patient scores a 5 for tremors, and high BP, anxiety. Patient is given 0.5 of Ativan and will be monitored on protocol.  Electronically signed by Idris Turner LPN on 1/70/6107 at 0:18 AM

## 2022-02-10 NOTE — PROGRESS NOTES
Reported pt's elevated blood pressure (161/121) to Dr. Altaf Beckwith. Received order for one time dose of librium. Will recheck pt's blood pressure after librium given. Pt denies headache. Pt asymptomatic.  Electronically signed by Harshil Hamilton RN on 2/10/22 at 11:01 AM EST

## 2022-02-10 NOTE — PROGRESS NOTES
Patient did not attend group despite staff encouragement.    Electronically signed by Etha Cecily on 2/9/2022 at 10:13 PM

## 2022-02-10 NOTE — PROGRESS NOTES
Morning Community Meeting Topics    Florin Calderon attended the morning community meeting on 2/10/22. Topics discussed today     [x] Introduction   Day of the week and date   Mask distribution   Current mask requirements  [x]Teams   Explanation of  Green and Blue team criteria   Nurses assigned to each team for today   Explanation about green and blue paper  o Date  o Patient's Name  o Patient's Nurse  o Goals  [x] Visitation   Announce the visiting hours for the day   Announce which team is allowed to have visitors for the day   Review any updated Covid 19 requirements for visitors during visitation  o Vaccine Card or negative Covid test within 48 hours of visit  o State Identification   Patients are reminded to alert the  at least 1 hour before visitation   [x] Unit Orientation   Coffee use   Phone location and etiquette   Shower locations  [employer] and dryer location and process   Common area expectations   Staff rounds expectation  [x] Meals    Educate patient to the menu  o The patient is encouraged to fill out the menu to get preferences at mealtime  o The patient is educated that if they do not fill out the menu, they will get the standard tray  o The coffee pot is decaf, patient encouraged to order regular coffee from menu.    Educate patient to the meal process   Patient encouraged to eat snacks provided twice daily  o Snacks may stay in patient room     [x] Discharge Process   Discharge expectations   Fill out the survey after discharge   [x] Hygiene   Daily showers encouraged  o Showers availability discussed    Daily dressing encouraged  o Discussed wearing street clothing   Education provided on where to place linens and clothing  o Linens in the hamper  o personal clothing does not go into the linen hamper  [x] Group    Patient encouraged to attend group provided   Time of Group Meetings discussed   Gentle reminder that attendance is a Physician order  [x] Movement   Chair exercises completed   Stretching completed  Notes:Goal - \"To make it through the day\" Electronically signed by MESERET Hamilton on 2/10/2022 at 10:01 AM

## 2022-02-10 NOTE — GROUP NOTE
Group Therapy Note    Date: 2/10/2022    Group Start Time: 1105  Group End Time:   Group Topic: Psychotherapy    [facility] 3W I    [facility]        [facility]    Attendees: 13         Patient's Goal:  ***    Notes:  ***    Status After Intervention:  {Status After Intervention:090727278}    Participation Level: {Participation Level:356143433}    Participation Quality: {[facility] PARTICIPATION QUALITY:380371377}      Speech:  {[facility] CD_SPEECH:41636}      Thought Process/Content: {Thought Process/Content:580151756}      Affective Functioning: {Affective Functionin}      Mood: {Mood:345410536}      Level of consciousness:  {Level of consciousness:257927815}      Response to Learnin [facility] Responses to Learnin}      Endings: {[facility] Endings:15417}    Modes of Intervention: {[facility] Modes of Intervention:778899163}      Discipline Responsible: {[facility] Multidisciplinary:074135576}      Signature:  Pelon Mireles [facility]

## 2022-02-10 NOTE — PROGRESS NOTES
Patient did not attend group despite staff encouragement.   Electronically signed by Lyla Mayo on 2/9/2022 at 10:26 PM

## 2022-02-11 PROCEDURE — 6370000000 HC RX 637 (ALT 250 FOR IP): Performed by: PSYCHIATRY & NEUROLOGY

## 2022-02-11 PROCEDURE — 1240000000 HC EMOTIONAL WELLNESS R&B

## 2022-02-11 PROCEDURE — 99232 SBSQ HOSP IP/OBS MODERATE 35: CPT | Performed by: PSYCHIATRY & NEUROLOGY

## 2022-02-11 PROCEDURE — 6370000000 HC RX 637 (ALT 250 FOR IP): Performed by: NURSE PRACTITIONER

## 2022-02-11 RX ORDER — LOSARTAN POTASSIUM 25 MG/1
25 TABLET ORAL DAILY
Status: DISCONTINUED | OUTPATIENT
Start: 2022-02-11 | End: 2022-02-14 | Stop reason: HOSPADM

## 2022-02-11 RX ORDER — SERTRALINE HYDROCHLORIDE 25 MG/1
25 TABLET, FILM COATED ORAL DAILY
Status: DISCONTINUED | OUTPATIENT
Start: 2022-02-11 | End: 2022-02-14

## 2022-02-11 RX ADMIN — LORAZEPAM 0.5 MG: 0.5 TABLET ORAL at 08:39

## 2022-02-11 RX ADMIN — SERTRALINE HYDROCHLORIDE 25 MG: 25 TABLET ORAL at 14:49

## 2022-02-11 RX ADMIN — FOLIC ACID 1 MG: 1 TABLET ORAL at 08:38

## 2022-02-11 RX ADMIN — LORAZEPAM 0.5 MG: 0.5 TABLET ORAL at 14:42

## 2022-02-11 RX ADMIN — LORAZEPAM 0.5 MG: 0.5 TABLET ORAL at 00:13

## 2022-02-11 RX ADMIN — ALUMINA, MAGNESIA, AND SIMETHICONE ORAL SUSPENSION REGULAR STRENGTH 30 ML: 1200; 1200; 120 SUSPENSION ORAL at 20:26

## 2022-02-11 RX ADMIN — LOSARTAN POTASSIUM 25 MG: 25 TABLET, FILM COATED ORAL at 14:42

## 2022-02-11 RX ADMIN — Medication 100 MG: at 08:38

## 2022-02-11 RX ADMIN — THERA TABS 1 TABLET: TAB at 08:38

## 2022-02-11 NOTE — GROUP NOTE
Group Therapy Note    Date: 2/11/2022    Group Start Time: 0200  Group End Time: 0240  Group Topic: Cognitive Skills    ML 3W [facility]    DANIELLA Roper        Group Therapy Note    Attendees: 8/15         Patient's Goal:  To participate in conversation starters and answer questions with their emotional mind     Notes:  Supportive to peers     Status After Intervention:  Improved    Participation Level:  Active Listener and Interactive    Participation Quality: Appropriate, Attentive, Sharing and Supportive      Speech:  normal      Thought Process/Content: Logical      Affective Functioning: Congruent      Mood: anxious and depressed      Level of consciousness:  Alert      Response to Learning: Progressing to goal      Endings: None Reported    Modes of Intervention: Education      Discipline Responsible: /Counselor      Signature:  DANIELLA Roper

## 2022-02-11 NOTE — PROGRESS NOTES
Patient did not attend group despite staff encouragement.   Electronically signed by Clayton Echols on 2/10/2022 at 10:09 PM

## 2022-02-11 NOTE — PROGRESS NOTES
Morning Community Meeting Topics    Maria Guadalupe Lyons attended the morning community meeting on 2/11/22. Topics discussed today     [x] Introduction   Day of the week and date   Mask distribution   Current mask requirements  [x]Teams   Explanation of  Green and Blue team criteria   Nurses assigned to each team for today   Explanation about green and blue paper  o Date  o Patient's Name  o Patient's Nurse  o Goals  [x] Visitation   Announce the visiting hours for the day   Announce which team is allowed to have visitors for the day   Review any updated Covid 19 requirements for visitors during visitation  o Vaccine Card or negative Covid test within 48 hours of visit  o State Identification   Patients are reminded to alert the  at least 1 hour before visitation   [x] Unit Orientation   Coffee use   Phone location and etiquette   Shower locations  [employer] and dryer location and process   Common area expectations   Staff rounds expectation  [x] Meals    Educate patient to the menu  o The patient is encouraged to fill out the menu to get preferences at mealtime  o The patient is educated that if they do not fill out the menu, they will get the standard tray  o The coffee pot is decaf, patient encouraged to order regular coffee from menu.    Educate patient to the meal process   Patient encouraged to eat snacks provided twice daily  o Snacks may stay in patient room     [x] Discharge Process   Discharge expectations   Fill out the survey after discharge   [x] Hygiene   Daily showers encouraged  o Showers availability discussed    Daily dressing encouraged  o Discussed wearing street clothing   Education provided on where to place linens and clothing  o Linens in the hamper  o personal clothing does not go into the linen hamper  [x] Group    Patient encouraged to attend group provided   Time of Group Meetings discussed   Gentle reminder that attendance is a Physician order  [x] Movement   Chair exercises completed   Stretching completed  Notes: : \" to go home\" Electronically signed by Gustavo Castro on 2/11/2022 at 9:45 AM

## 2022-02-11 NOTE — PROGRESS NOTES
Patient is continued on the CIWA. Patient is medicated with 0.5 mg of Ativan for tremor and  Anxiety. Pts /101 (75) and 142/95(79).   Pt will will be monitored per protocol q.4h. Electronically signed by Kulwant Shin LPN on 9/75/2069 at 7:90 AM

## 2022-02-11 NOTE — PROGRESS NOTES
Patient accepted PRN ativan 0.5mg for CIWA score of 5. BP- 165/103. Pt reports having indigestion. Pt also received PRN MAALOX.

## 2022-02-11 NOTE — GROUP NOTE
Group Therapy Note    Date: 2/11/2022    Group Start Time: 1000  Group End Time: 1100  Group Topic: Psychoeducation    [facility] 3W [facility]    Lindsay Briggs, CTRS        Group Therapy Note    Attendees: 14         Patient's Goal:  \"to go home\"    Notes:  Pt. attended the 1000 skill group. Pt. was engaged in the group discussion with interest. Listened to music. Feeling betetr. Status After Intervention:  Improved    Participation Level:  Active Listener and Interactive    Participation Quality: Appropriate, Attentive and Sharing      Speech:  normal      Thought Process/Content: Logical      Affective Functioning: Congruent      Mood: calm      Level of consciousness:  Alert, Oriented x4 and Attentive      Response to Learning: Progressing to goal      Endings: None Reported    Modes of Intervention: Education, Support, Socialization and Activity      Discipline Responsible: Psychoeducational Specialist      Signature:  Rupesh Boswell

## 2022-02-11 NOTE — PROGRESS NOTES
[address] FOLLOW-UP NOTE       2/11/2022     Patient was seen and examined in person, Chart reviewed   Patient's case discussed with staff/team    Chief Complaint: Depression Alcoholism    Interim History:   No change in his presentation since yesterday other than withdrawal symptoms getting better  Patient report feeling depressed with anhedonia  Patient is on CIWA protocol  Patient report feeling hopeless and worthless   Passive death wishes without any plans  Patient denies any audiovisual hallucinations or paranoid thoughts  He is alert and oriented x3  Appetite:   [] Normal/Unchanged  [] Increased  [x] Decreased      Sleep:       [] Normal/Unchanged  [] Fair       [x] Poor              Energy:    [] Normal/Unchanged  [] Increased  [x] Decreased        SI [x] Present  [] Absent    HI  []Present  [] Absent     Aggression:  [] yes  [] no    Patient is [] able  [x] unable to CONTRACT FOR SAFETY     PAST MEDICAL/PSYCHIATRIC HISTORY:   History reviewed. No pertinent past medical history. FAMILY/SOCIAL HISTORY:  History reviewed. No pertinent family history.   Social History     Socioeconomic History    Marital status: Single     Spouse name: Not on file    Number of children: Not on file    Years of education: Not on file    Highest education level: Not on file   Occupational History    Not on file   Tobacco Use    Smoking status: Current Every Day Smoker     Packs/day: 1.00     Years: 20.00     Pack years: 20.00     Types: Cigarettes    Smokeless tobacco: Never Used   Vaping Use    Vaping Use: Never used   Substance and Sexual Activity    Alcohol use: Yes     Comment: daily rum    Drug use: No    Sexual activity: Not on file   Other Topics Concern    Not on file   Social History Narrative    Not on file     Social Determinants of Health     Financial Resource Strain:     Difficulty of Paying Living Expenses: Not on file   Food Insecurity:     Worried About Running Out of Food in the Last Year: Not on file    Ran Out of Food in the Last Year: Not on file   Transportation Needs:     Lack of Transportation (Medical): Not on file    Lack of Transportation (Non-Medical): Not on file   Physical Activity:     Days of Exercise per Week: Not on file    Minutes of Exercise per Session: Not on file   Stress:     Feeling of Stress : Not on file   Social Connections:     Frequency of Communication with Friends and Family: Not on file    Frequency of Social Gatherings with Friends and Family: Not on file    Attends Alevism Services: Not on file    Active Member of [address] [place] [address] [employer] or Organizations: Not on file    Attends Club or Organization Meetings: Not on file    Marital Status: Not on file   Intimate Partner Violence:     Fear of Current or Ex-Partner: Not on file    Emotionally Abused: Not on file    Physically Abused: Not on file    Sexually Abused: Not on file   Housing Stability:     Unable to Pay for Housing in the Last Year: Not on file    Number of [place] in the Last Year: Not on file    Unstable Housing in the Last Year: Not on file           ROS:  [x] All negative/unchanged except if checked.  Explain positive(checked items) below:  [] Constitutional  [] Eyes  [] Ear/Nose/Mouth/Throat  [] Respiratory  [] CV  [] GI  []   [] Musculoskeletal  [] Skin/Breast  [] Neurological  [] Endocrine  [] Heme/Lymph  [] Allergic/Immunologic    Explanation:     MEDICATIONS:    Current Facility-Administered Medications:     losartan (COZAAR) tablet 25 mg, 25 mg, Oral, Daily, SHERIF Frankel - CNP, 25 mg at 02/11/22 1442    sertraline (ZOLOFT) tablet 25 mg, 25 mg, Oral, Daily, Sergio Estrada MD, 25 mg at 02/11/22 1449    LORazepam (ATIVAN) tablet 0.5 mg, 0.5 mg, Oral, Q4H PRN, 0.5 mg at 02/11/22 1442 **OR** LORazepam (ATIVAN) injection 0.5 mg, 0.5 mg, IntraMUSCular, Q4H PRN **OR** LORazepam (ATIVAN) tablet 1 mg, 1 mg, Oral, Q4H PRN **OR** LORazepam (ATIVAN) injection 1 mg, 1 mg, IntraMUSCular, Q4H PRN **OR** LORazepam (ATIVAN) tablet 2 mg, 2 mg, Oral, Q2H PRN **OR** LORazepam (ATIVAN) injection 2 mg, 2 mg, IntraMUSCular, Q2H PRN **OR** LORazepam (ATIVAN) tablet 3 mg, 3 mg, Oral, Q1H PRN **OR** LORazepam (ATIVAN) injection 3 mg, 3 mg, IntraMUSCular, Q1H PRN **OR** LORazepam (ATIVAN) tablet 4 mg, 4 mg, Oral, Q1H PRN **OR** LORazepam (ATIVAN) injection 4 mg, 4 mg, IntraMUSCular, Q1H PRN, Edith Quintana MD    acetaminophen (TYLENOL) tablet 650 mg, 650 mg, Oral, Q4H PRN, Edith Quintana MD    polyethylene glycol (GLYCOLAX) packet 17 g, 17 g, Oral, Daily PRN, Edith Quintana MD    traZODone (DESYREL) tablet 50 mg, 50 mg, Oral, Nightly PRN, Edith Quintana MD    aluminum & magnesium hydroxide-simethicone (MAALOX) 200-200-20 MG/5ML suspension 30 mL, 30 mL, Oral, Q6H PRN, Edith Quintana MD    nicotine (NICODERM CQ) 21 MG/24HR 1 patch, 1 patch, TransDERmal, Daily, Edith Quintana MD, 1 patch at 02/11/22 0828    hydrOXYzine (VISTARIL) capsule 50 mg, 50 mg, Oral, Q6H PRN **OR** hydrOXYzine (VISTARIL) injection 50 mg, 50 mg, IntraMUSCular, Q6H PRN, Edith Quintana MD    haloperidol (HALDOL) tablet 5 mg, 5 mg, Oral, Q6H PRN **OR** haloperidol lactate (HALDOL) injection 5 mg, 5 mg, IntraMUSCular, Q6H PRN, Edith Quintana MD    multivitamin 1 tablet, 1 tablet, Oral, Daily, Edith Quintana MD, 1 tablet at 02/11/22 1551    thiamine tablet 100 mg, 100 mg, Oral, Daily, Edith Quintana MD, 100 mg at 76/09/39 2022    folic acid (FOLVITE) tablet 1 mg, 1 mg, Oral, Daily, Edith Quintana MD, 1 mg at 02/11/22 2103      Examination:  /89   Pulse 74   Temp 97.3 °F (36.3 °C)   Resp 16   Ht 5' 10\" (1.778 m)   Wt 163 lb (73.9 kg)   SpO2 100%   BMI 23.39 kg/m²   Gait - steady  Medication side effects(SE): no    Mental Status Examination:    Level of consciousness:  within normal limits   Appearance:  poor grooming and poor hygiene  Behavior/Motor: psychomotor retardation  Attitude toward examiner:  withdrawn  Speech:  slow   Mood: anxious, constricted and depressed  Affect:  blunted  Thought processes:  slow   Thought content:  Suicidal Ideation:  passive  Delusions:  no evidence of delusions  Perceptual Disturbance:  denies any perceptual disturbance  Cognition:  oriented to person, place, and time   Concentration intact  Insight fair   Judgement fair     ASSESSMENT:   Patient symptoms are:  [] Well controlled  [x] Improving  [] Worsening  [] No change       Diagnosis:   Active Problems:    Substance induced mood disorder ([facility] [address]  Resolved Problems:    * No resolved hospital problems. *  MDD single episode severe    LABS:    Recent Labs     02/08/22 2004   WBC 7.8   HGB 15.2        Recent Labs     02/08/22 2004      K 3.4   CL 96   CO2 24   BUN 14   CREATININE 0.69*   GLUCOSE 110*     Recent Labs     02/08/22 2004   BILITOT 0.6   ALKPHOS 95   AST 89*   ALT 49*     Lab Results   Component Value Date    LABAMPH Neg 02/08/2022    BARBSCNU Neg 02/08/2022    LABBENZ Neg 02/08/2022    LABMETH Neg 02/08/2022    OPIATESCREENURINE Neg 02/08/2022    PHENCYCLIDINESCREENURINE Neg 02/08/2022    ETOH 58 02/08/2022     Lab Results   Component Value Date    TSH 1.300 02/08/2022     No results found for: LITHIUM  No results found for: VALPROATE, CBMZ    RISK ASSESSMENT: high risk of suicide    Treatment Plan:  Reviewed current Medications with the patient. Medication as ordered  Zoloft 25 mg added\  Risks, benefits, side effects, drug-to-drug interactions and alternatives to treatment were discussed. Collateral information:   CD evaluation  Encourage patient to attend group and other milieu activities.   Discharge planning discussed with the patient and treatment team.    PSYCHOTHERAPY/COUNSELING:  [x] Therapeutic interview  [x] Supportive  [] CBT  [] Ongoing  [] Other    [x] Patient continues to need, on a daily basis, active treatment furnished directly by or requiring the supervision of inpatient psychiatric personnel      Anticipated Length of stay:            Electronically signed by Lucho Leyva MD on 2/11/2022 at 5:01 PM

## 2022-02-11 NOTE — GROUP NOTE
Group Therapy Note    Date: 2/11/2022    Group Start Time: 3706  Group End Time: 1384  Group Topic: Healthy Living/Wellness    MLOZ 3W MAURICE Owens        Group Therapy Note    Attendees: 9/15         Patient's Goal:  To participate in an activity learning about self esteem    Notes:  Patient actively participated in group. Pt was quiet and reserved. Status After Intervention:  Unchanged    Participation Level:  Active Listener and Interactive    Participation Quality: Appropriate, Attentive and Sharing      Speech:  quiet      Thought Process/Content: Logical      Affective Functioning: Blunted      Mood: depressed      Level of consciousness:  Alert and Attentive      Response to Learning: Progressing to goal      Endings: None Reported    Modes of Intervention: Education      Discipline Responsible: [address], [employer]      Signature:  Avila Owens

## 2022-02-11 NOTE — PROGRESS NOTES
Patient did not attend group despite staff encouragement.   Electronically signed by Maribell Shannon on 2/10/2022 at 9:42 PM

## 2022-02-11 NOTE — GROUP NOTE
Group Therapy Note    Date: 2/11/2022    Group Start Time: 1105  Group End Time: 4544  Group Topic: Psychotherapy    MLJOE[facility] 3W I    DANIELLA Zavala        Group Therapy Note    Attendees: 10/2         Patient's Goal:  \"continue a sober life. \"     Notes:  Very supportive to peers     Status After Intervention:  Improved    Participation Level:  Active Listener and Interactive    Participation Quality: Appropriate, Attentive, Sharing and Supportive      Speech:  normal      Thought Process/Content: Logical      Affective Functioning: Flat      Mood: depressed      Level of consciousness:  Alert      Response to Learning: Progressing to goal      Endings: None Reported    Modes of Intervention: Education      Discipline Responsible: /Counselor      Signature:  DANIELLA Zavala

## 2022-02-11 NOTE — PROGRESS NOTES
Patient visible on unit this morning, social with select peers. Pt pleasant with this nurse. Flat affect. Pt reports poor quality sleep last night d/t vivid dreams. Pt states he is going to remove nicotine patch tonight before bed. Pt reports good appetite. Pt reports he is anxious, but states it is from being here and he's normally not anxious. Pt reports mild depression. Pt future oriented, states he exchanged numbers with male peers on unit and they have plans to go bowling. Pt denies SI, HI, and Hallucinations. Pt states he feels he is ready for discharge.  Electronically signed by Lynn Worthy RN on 2/11/22 at 9:24 AM EST

## 2022-02-11 NOTE — PROGRESS NOTES
Patient continues on the CIWA q.4h.  Patients BP inches back up to 146/107. Ativan 0.5 mg given per protocol. Patient is also started on Losartan 25 mg. Patient is asked if he had a BP problem before admittance here. Patient stated, \"I have never had a BP issue. \"  Patient does not have a PCP. Patient is informed to ask for referral when leaves so that he can have someone to follow his BP. Patient understands. Patient says he has a BP cuff at home and never has had high BP.  Electronically signed by Tavia Jasmnie LPN on 1/14/3732 at 8:41 PM

## 2022-02-11 NOTE — PROGRESS NOTES
Pt states depression is #5-6/10, anxiety is #5/10. Denies SI/HI/AVH. Pt reports he attends all groups, also reports he sleeps 8 hrs, does report he wakes up freq with room checks throughout the night. Pt reports good appetite.

## 2022-02-11 NOTE — CONSULTS
[facility]  MEDICINE    HISTORY AND PHYSICAL EXAM    PATIENT NAME:  Nikia Paiz    MRN:  [id number]  SERVICE DATE:  2/10/2022   SERVICE TIME:  9:40 PM    Primary Care Physician: No primary care provider on file. SUBJECTIVE  CHIEF COMPLAINT:  Medically appropriate for inpatient psychiatry admission. Consult for medical H/P encounter. HPI:  This is a 63 y.o. male with PMHx alcohol abuse, 1ppd smoker who presents with depression and suicidal ideation. Patient relapse after 15 years sobriety and has escalated to daily drinking for the last 2 years since his divorce. Patient reports drinking at least 1.5 gallons of rum per week. BAL on arrival 106. Vitals stable. Patient medically cleared from emergency room for psychiatric care with [facility] protocol in place. Patient Seen, Chart, Labs, Radiologystudies, and Consults reviewed. Patient denies headache, chest pain, shortness of breath, N/V/D/C, fever/chills. PAST MEDICAL HISTORY:  History reviewed. No pertinent past medical history. PAST SURGICAL HISTORY:  History reviewed. No pertinent surgical history. FAMILY HISTORY:  History reviewed. No pertinent family history.   SOCIAL HISTORY:    Social History     Socioeconomic History    Marital status: Single     Spouse name: Not on file    Number of children: Not on file    Years of education: Not on file    Highest education level: Not on file   Occupational History    Not on file   Tobacco Use    Smoking status: Current Every Day Smoker     Packs/day: 1.00     Years: 20.00     Pack years: 20.00     Types: Cigarettes    Smokeless tobacco: Never Used   Vaping Use    Vaping Use: Never used   Substance and Sexual Activity    Alcohol use: Yes     Comment: daily rum    Drug use: No    Sexual activity: Not on file   Other Topics Concern    Not on file   Social History Narrative    Not on file     Social Determinants of Health     Financial Resource Strain:     Difficulty of Paying Donnie Leo MD        Or    LORazepam (ATIVAN) injection 3 mg  3 mg IntraMUSCular Q1H PRN Haydee Hood MD        Or    LORazepam (ATIVAN) tablet 4 mg  4 mg Oral Q1H PRN Haydee Hood MD        Or    LORazepam (ATIVAN) injection 4 mg  4 mg IntraMUSCular Q1H PRN Haydee Hood MD        acetaminophen (TYLENOL) tablet 650 mg  650 mg Oral Q4H PRN Haydee Hood MD        polyethylene glycol (GLYCOLAX) packet 17 g  17 g Oral Daily PRN Haydee Hood MD        traZODone (DESYREL) tablet 50 mg  50 mg Oral Nightly PRN Haydee Hood MD        aluminum & magnesium hydroxide-simethicone (MAALOX) 200-200-20 MG/5ML suspension 30 mL  30 mL Oral Q6H PRN Haydee Hood MD        nicotine (NICODERM CQ) 21 MG/24HR 1 patch  1 patch TransDERmal Daily Haydee Hood MD   1 patch at 02/10/22 0227    hydrOXYzine (VISTARIL) capsule 50 mg  50 mg Oral Q6H PRN Haydee Hood MD        Or    hydrOXYzine (VISTARIL) injection 50 mg  50 mg IntraMUSCular Q6H PRN Haydee Hood MD        haloperidol (HALDOL) tablet 5 mg  5 mg Oral Q6H PRN Haydee Hood MD        Or    haloperidol lactate (HALDOL) injection 5 mg  5 mg IntraMUSCular Q6H PRN Haydee Hood MD        multivitamin 1 tablet  1 tablet Oral Daily Haydee Hood MD   1 tablet at 02/10/22 8096    thiamine tablet 100 mg  100 mg Oral Daily Haydee Hood MD   100 mg at 41/18/20 6133    folic acid (FOLVITE) tablet 1 mg  1 mg Oral Daily Haydee Hood MD   1 mg at 02/10/22 7876       ALLERGIES: Patient has no known allergies. REVIEW OF SYSTEM:   ROS as noted in HPI, 12 point ROS reviewed and otherwise negative.     OBJECTIVE  PHYSICAL EXAM: BP (!) 120/98   Pulse 75   Temp 98.1 °F (36.7 °C)   Resp 18   Ht 5' 10\" (1.778 m)   Wt 163 lb (73.9 kg)   SpO2 96%   BMI 23.39 kg/m²   CONSTITUTIONAL:  awake, alert, cooperative, no apparent distress, and appears stated age  EYES:  Lids and lashes normal, pupils equal, round and reactive to light, extra ocular muscles intact, sclera clear, conjunctiva normal  ENT:  Normocephalic, without obvious abnormality, atraumatic, sinuses nontender on palpation, external ears without lesions, oral pharynx with moist mucus membranes, tonsils without erythema or exudates, gums normal and good dentition. NECK:  Supple, symmetrical, trachea midline, no adenopathy, thyroid symmetric, not enlarged and no tenderness, skin normal  LUNGS:  No increased work of breathing, good air exchange, clear to auscultation bilaterally, no crackles or wheezing  CARDIOVASCULAR:  Normal apical impulse, regular rate and rhythm, normal S1 and S2, no S3 or S4, and no murmur noted  ABDOMEN:  No scars, normal bowel sounds, soft, non-distended, non-tender, no masses palpated, no hepatosplenomegally  MUSCULOSKELETAL:  There is no redness, warmth, or swelling of the joints. Full range of motion noted. Motor strength is 5 out of 5 all extremities bilaterally. Tone is normal.  NEUROLOGIC:  Awake, alert, oriented to name, place and time. Cranial nerves II-XII are grossly intact. Motor is 5 out of 5 bilaterally. Sensory is intact.  gait is normal.  SKIN:  no bruising or bleeding, normal skin color, texture, turgor, no redness, warmth, or swelling and no jaundice    DATA:     Diagnostic tests reviewed for today's visit:    Most recent labs and imaging results reviewed. VTE Prophylaxis:     ASSESSMENT AND PLAN  Active Problems:    Substance induced mood disorder ([facility] [address]  Plan: Patient admitted to behavorial health for evaluation and treatment     Alcohol dependence with withdrawal: CIWA protocol, seizure precautions, fall precautions Ativan prn CIWA triggered. Multivitamin, thiamine, folate PO supplements. HTN: Stable. Continue home meds     This is only a history and physical examination and not medical management.  The patient is to contact and follow up with their primary care physician and go over any abnormal labs, imaging, findings, medical concerns, or conditions that we have and have not addressed during this encounter.     Plan of care discussed with: patient    SIGNATURE: SHERIF Baugh CNP  DATE: February 10, 2022  TIME: 9:40 PM

## 2022-02-11 NOTE — PROGRESS NOTES
Pt is in the day room watching tv. Pt denies any withdrawal symptoms. Vitals taken /98, HR 75. Will continue to monitor.

## 2022-02-12 PROCEDURE — 6370000000 HC RX 637 (ALT 250 FOR IP): Performed by: PSYCHIATRY & NEUROLOGY

## 2022-02-12 PROCEDURE — 1240000000 HC EMOTIONAL WELLNESS R&B

## 2022-02-12 PROCEDURE — 6370000000 HC RX 637 (ALT 250 FOR IP): Performed by: NURSE PRACTITIONER

## 2022-02-12 RX ADMIN — FOLIC ACID 1 MG: 1 TABLET ORAL at 08:57

## 2022-02-12 RX ADMIN — LOSARTAN POTASSIUM 25 MG: 25 TABLET, FILM COATED ORAL at 08:57

## 2022-02-12 RX ADMIN — SERTRALINE HYDROCHLORIDE 25 MG: 25 TABLET ORAL at 08:58

## 2022-02-12 RX ADMIN — LORAZEPAM 0.5 MG: 0.5 TABLET ORAL at 11:10

## 2022-02-12 RX ADMIN — Medication 100 MG: at 08:57

## 2022-02-12 RX ADMIN — THERA TABS 1 TABLET: TAB at 08:57

## 2022-02-12 NOTE — GROUP NOTE
Group Therapy Note    Date: 2/11/2022    Group Start Time: 1935  Group End Time: 2010  Group Topic: Recreational    [facility] 3[facility] I    Td Bonds        Group Therapy Note    Attendees: 11/18         Patient's Goal:  To participate in Wii Bowling with group    Notes:  Patient actively participated and socialized with peers    Status After Intervention:  Improved    Participation Level:  Active Listener and Interactive    Participation Quality: Sharing and Supportive      Speech:  normal      Thought Process/Content: Logical      Affective Functioning: Congruent      Mood: euthymic      Level of consciousness:  Alert and Attentive      Response to Learning: Progressing to goal      Endings: None Reported    Modes of Intervention: Activity      Discipline Responsible: [address], [employer]      Signature:  Td Bonds

## 2022-02-12 NOTE — GROUP NOTE
Group Therapy Note    Date: 2/12/2022    Group Start Time: 1630  Group End Time: 1710  Group Topic: Psychoeducation    [facility]    CARINA Flores LSW        Group Therapy Note    Attendees: 14         Patient's Goal:  To participate in a Psychoeducational group theray     Notes:  Patient participated in stress management techniques    Status After Intervention:  Improved    Participation Level: Interactive    Participation Quality: Appropriate      Speech:  normal      Thought Process/Content: Logical      Affective Functioning: Congruent      Mood: calm      Level of consciousness:  Alert      Response to Learning: Able to verbalize current knowledge/experience      Endings: None Reported    Modes of Intervention: Education      Discipline Responsible: /Counselor      Signature:  Debbi Bro, ROSA LIM

## 2022-02-12 NOTE — CARE COORDINATION
Pt calm, cooperative, and pleasant. Denies all.  6/10 depression and anxiety is mild. Pt reports feeling more anxious in the morning because he was hopeful for discharge. \"My family is anxious for me to come home too. \"  Pt said, \"I don't want to bother with booze anymore. \"  Pt states he has been to inpatient treatment and was very involved in [place] [id number]. Pt states he has the tools he needs. Pt also has some \"irons in the fire\" in regards to a job.

## 2022-02-12 NOTE — GROUP NOTE
Group Therapy Note    Date: 2/11/2022    Group Start Time: 2100  Group End Time: 2115  Group Topic: Wrap-Up    [facility] 3[facility] EVERTON Dougherty        Group Therapy Note    Attendees: 8/18         Patient's Goal:  \"continue a sober life, get closer to God, provide for my family, do good for others. \"    Notes:  Pt reports meeting goal. Pt stated he was happy he was able to see his daughter today    Status After Intervention:  Improved    Participation Level:  Active Listener and Interactive    Participation Quality: Appropriate and Sharing      Speech:  normal      Thought Process/Content: Logical      Affective Functioning: Congruent      Mood: euthymic      Level of consciousness:  Alert and Attentive      Response to Learning: Progressing to goal      Endings: None Reported    Modes of Intervention: Support      Discipline Responsible: [address], [employer]      Signature:  Yadira Dougherty

## 2022-02-12 NOTE — GROUP NOTE
Group Therapy Note    Date: 2/12/2022    Group Start Time: 1300  Group End Time: 2936  Group Topic: Healthy Living/Wellness    [facility]    Jessie Valdez RN        Group Therapy Note    Attendees: 12/19         Patient's Goal:   Learning about self care, how to plan to incorporate it into your daily life.     Status After Intervention:  Unchanged    Participation Level: Interactive    Participation Quality: Appropriate      Speech:  normal      Thought Process/Content: Logical      Affective Functioning: Congruent      Mood: euthymic      Level of consciousness:  Oriented x4      Response to Learning: Able to verbalize current knowledge/experience      Endings: None Reported    Modes of Intervention: Education, Support and Socialization      Discipline Responsible: Registered Nurse      Signature:  Jessie Valdez RN

## 2022-02-12 NOTE — PROGRESS NOTES
[facility] FOLLOW-UP NOTE       2/12/2022     Patient was seen and examined in person, Chart reviewed   Patient's case discussed with staff/team    Chief Complaint: Depression Alcoholism    Interim History:   Patient said he felt \"depressed and stressed\" prior to admission. 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 He had relapsed a year and a half ago. He said he came here to [facility] out\", after he had gotten in an argument with his girlfriend last weekend. He said he was now feeling \"much better\" and was \"anxious because he is still here\". He said he was \"depressed today\" because he thought he would be going home yesterday. He denied suicidal or homicidal ideation. He denied hallucinations, paranoia or other delusions. Appetite:   [x] Normal/Unchanged  [] Increased  [] Decreased      Sleep:       [x] Normal/Unchanged  [] Fair       [] Poor              Energy:    [x] Normal/Unchanged  [] Increased  [] Decreased        SI [] Present  [x] Absent    HI  []Present  [x] Absent     Aggression:  [] yes  [x] no    Patient is [] able  [x] unable to CONTRACT FOR SAFETY     PAST MEDICAL/PSYCHIATRIC HISTORY:   History reviewed. No pertinent past medical history. FAMILY/SOCIAL HISTORY:  History reviewed. No pertinent family history.   Social History     Socioeconomic History    Marital status: Single     Spouse name: Not on file    Number of children: Not on file    Years of education: Not on file    Highest education level: Not on file   Occupational History    Not on file   Tobacco Use    Smoking status: Current Every Day Smoker     Packs/day: 1.00     Years: 20.00     Pack years: 20.00     Types: Cigarettes    Smokeless tobacco: Never Used   Vaping Use    Vaping Use: Never used   Substance and Sexual Activity    Alcohol use: Yes     Comment: daily rum    Drug use: No    Sexual activity: Not on file   Other Topics Concern    Not on file   Social History Narrative    Not on file     Social Determinants of Health     Financial Resource Strain:     Difficulty of Paying Living Expenses: Not on file   Food Insecurity:     Worried About Running Out of Food in the Last Year: Not on file    [place] of Food in the Last Year: Not on file   Transportation Needs:     Lack of Transportation (Medical): Not on file    Lack of Transportation (Non-Medical): Not on file   Physical Activity:     Days of Exercise per Week: Not on file    Minutes of Exercise per Session: Not on file   Stress:     Feeling of Stress : Not on file   Social Connections:     Frequency of Communication with Friends and Family: Not on file    Frequency of Social Gatherings with Friends and Family: Not on file    Attends Restoration Services: Not on file    Active Member of [address] [place] [address] [employer] or Organizations: Not on file    Attends Club or Organization Meetings: Not on file    Marital Status: Not on file   Intimate Partner Violence:     Fear of Current or Ex-Partner: Not on file    Emotionally Abused: Not on file    Physically Abused: Not on file    Sexually Abused: Not on file   Housing Stability:     Unable to Pay for Housing in the Last Year: Not on file    Number of [place] in the Last Year: Not on file    Unstable Housing in the Last Year: Not on file           ROS:  [x] All negative/unchanged except if checked.  Explain positive(checked items) below:  [] Constitutional  [] Eyes  [] Ear/Nose/Mouth/Throat  [] Respiratory  [] CV  [] GI  []   [] Musculoskeletal  [] Skin/Breast  [] Neurological  [] Endocrine  [] Heme/Lymph  [] Allergic/Immunologic    Explanation:     MEDICATIONS:    Current Facility-Administered Medications:     losartan (COZAAR) tablet 25 mg, 25 mg, Oral, Daily, SHERIF Perdomo CNP, 25 mg at 02/12/22 0857    sertraline (ZOLOFT) tablet 25 mg, 25 mg, Oral, Daily, Maribel Ignacio MD, 25 mg at 02/12/22 0858    LORazepam (ATIVAN) tablet 0.5 mg, 0.5 mg, Oral, Q4H PRN, 0.5 mg at 02/12/22 1110 **OR** LORazepam (ATIVAN) injection 0.5 mg, 0.5 mg, IntraMUSCular, Q4H PRN **OR** LORazepam (ATIVAN) tablet 1 mg, 1 mg, Oral, Q4H PRN **OR** LORazepam (ATIVAN) injection 1 mg, 1 mg, IntraMUSCular, Q4H PRN **OR** LORazepam (ATIVAN) tablet 2 mg, 2 mg, Oral, Q2H PRN **OR** LORazepam (ATIVAN) injection 2 mg, 2 mg, IntraMUSCular, Q2H PRN **OR** LORazepam (ATIVAN) tablet 3 mg, 3 mg, Oral, Q1H PRN **OR** LORazepam (ATIVAN) injection 3 mg, 3 mg, IntraMUSCular, Q1H PRN **OR** LORazepam (ATIVAN) tablet 4 mg, 4 mg, Oral, Q1H PRN **OR** LORazepam (ATIVAN) injection 4 mg, 4 mg, IntraMUSCular, Q1H PRN, Barb Rivera MD    acetaminophen (TYLENOL) tablet 650 mg, 650 mg, Oral, Q4H PRN, Barb Rivera MD    polyethylene glycol (GLYCOLAX) packet 17 g, 17 g, Oral, Daily PRN, Barb Rivera MD    traZODone (DESYREL) tablet 50 mg, 50 mg, Oral, Nightly PRN, Barb Rivera MD    aluminum & magnesium hydroxide-simethicone (MAALOX) 200-200-20 MG/5ML suspension 30 mL, 30 mL, Oral, Q6H PRN, Barb Rivera MD, 30 mL at 02/11/22 2026    nicotine (NICODERM CQ) 21 MG/24HR 1 patch, 1 patch, TransDERmal, Daily, Barb Rivera MD, 1 patch at 02/12/22 0858    hydrOXYzine (VISTARIL) capsule 50 mg, 50 mg, Oral, Q6H PRN **OR** hydrOXYzine (VISTARIL) injection 50 mg, 50 mg, IntraMUSCular, Q6H PRN, Barb Rivera MD    haloperidol (HALDOL) tablet 5 mg, 5 mg, Oral, Q6H PRN **OR** haloperidol lactate (HALDOL) injection 5 mg, 5 mg, IntraMUSCular, Q6H PRN, Barb Rivera MD    multivitamin 1 tablet, 1 tablet, Oral, Daily, Barb Rivera MD, 1 tablet at 02/12/22 0857    thiamine tablet 100 mg, 100 mg, Oral, Daily, Barb Rivera MD, 100 mg at 22/46/48 9290    folic acid (FOLVITE) tablet 1 mg, 1 mg, Oral, Daily, Barb Rivera MD, 1 mg at 02/12/22 0857      Examination:  BP (!) 122/90   Pulse 63   Temp 98.4 °F (36.9 °C)   Resp 17   Ht 5' 10\" (1.778 m)   Wt 163 lb (73.9 kg)   SpO2 99%   BMI 23.39 kg/m²   Gait - steady  Medication side effects(SE): no    Mental Status Examination:    Level of consciousness:  within normal limits   Appearance:  fair grooming and fair hygiene  Behavior/Motor:  psychomotor retardation  Attitude toward examiner:  withdrawn  Speech:  slow   Mood: anxious, constricted and depressed  Affect: blunted  Thought processes: slow   Thought content: Suicidal Ideation: denies  Delusions:  no evidence of delusions  Perceptual Disturbance: denies any perceptual disturbance  Cognition:  oriented to person, place, and time   Concentration intact  Insight fair   Judgement fair     ASSESSMENT:   Patient symptoms are:  [x] Well controlled  [x] Improving  [] Worsening  [] No change       Diagnosis:   Active Problems:    Substance induced mood disorder ([facility] [address]  Resolved Problems:    * No resolved hospital problems. *  MDD single episode severe    LABS:    No results for input(s): WBC, HGB, PLT in the last 72 hours. No results for input(s): NA, K, CL, CO2, BUN, CREATININE, GLUCOSE in the last 72 hours. No results for input(s): BILITOT, ALKPHOS, AST, ALT in the last 72 hours. Lab Results   Component Value Date    LABAMPH Neg 02/08/2022    BARBSCNU Neg 02/08/2022    LABBENZ Neg 02/08/2022    LABMETH Neg 02/08/2022    OPIATESCREENURINE Neg 02/08/2022    PHENCYCLIDINESCREENURINE Neg 02/08/2022    ETOH 58 02/08/2022     Lab Results   Component Value Date    TSH 1.300 02/08/2022     No results found for: LITHIUM  No results found for: VALPROATE, CBMZ    RISK ASSESSMENT: high risk of suicide    Treatment Plan:  Reviewed current Medications with the patient. Medication as ordered  Risks, benefits, side effects, drug-to-drug interactions and alternatives to treatment were discussed. Collateral information:   CD evaluation  Encourage patient to attend group and other milieu activities.   Discharge planning discussed with the patient and treatment team.    PSYCHOTHERAPY/COUNSELING:  [x] Therapeutic interview  [x] Supportive  [] CBT  [] Ongoing  [] Other    [x] Patient continues to need, on a daily basis, active treatment furnished directly by or requiring the supervision of inpatient psychiatric personnel      Anticipated Length of stay:            Electronically signed by Swati Horan MD on 2/12/2022 at 4:36 PM

## 2022-02-12 NOTE — PROGRESS NOTES
Morning Community Meeting Topics    Nikia Paiz attended the morning community meeting on 2/12/22. Topics discussed today     [x] Introduction   Day of the week and date   Mask distribution   Current mask requirements  [x]Teams   Explanation of  Green and Blue team criteria   Nurses assigned to each team for today   Explanation about green and blue paper  o Date  o Patient's Name  o Patient's Nurse  o Goals  [x] Visitation   Announce the visiting hours for the day   Announce which team is allowed to have visitors for the day   Review any updated Covid 19 requirements for visitors during visitation  o Vaccine Card or negative Covid test within 48 hours of visit  o State Identification   Patients are reminded to alert the  at least 1 hour before visitation   [x] Unit Orientation   Coffee use   Phone location and etiquette   Shower locations  [employer] and dryer location and process   Common area expectations   Staff rounds expectation  [x] Meals    Educate patient to the menu  o The patient is encouraged to fill out the menu to get preferences at mealtime  o The patient is educated that if they do not fill out the menu, they will get the standard tray  o The coffee pot is decaf, patient encouraged to order regular coffee from menu.    Educate patient to the meal process   Patient encouraged to eat snacks provided twice daily  o Snacks may stay in patient room     [x] Discharge Process   Discharge expectations   Fill out the survey after discharge   [x] Hygiene   Daily showers encouraged  o Showers availability discussed    Daily dressing encouraged  o Discussed wearing street clothing   Education provided on where to place linens and clothing  o Linens in the hamper  o personal clothing does not go into the linen hamper  [x] Group    Patient encouraged to attend group provided   Time of Group Meetings discussed   Gentle reminder that attendance is a Physician order  [x] Movement   Chair exercises completed   Stretching completed  Notes: GOAL : \" to have a good day\" Electronically signed by Dora Parker on 2/12/2022 at 11:48 AM

## 2022-02-12 NOTE — GROUP NOTE
Group Therapy Note    Date: 2/12/2022    Group Start Time: 1000  Group End Time: 1100  Group Topic: Psychoeducation    [facility]    Ling Ramires, CTRS        Group Therapy Note    Attendees: 14           Patient's Goal:  *to have a good day\"    Notes:  Pt. attended the 1000 skill group. Enjoyed the music and conversation with other pts. Very focused on being dc. Status After Intervention:  Unchanged    Participation Level:  Active Listener and Interactive    Participation Quality: Appropriate and Attentive      Speech:  normal      Thought Process/Content: Logical      Affective Functioning: Congruent      Mood: calm      Level of consciousness:  Alert, Oriented x4 and Attentive      Response to Learning: Progressing to goal      Endings: None Reported    Modes of Intervention: Education, Support, Socialization and Activity      Discipline Responsible: Psychoeducational Specialist      Signature:  Martin Mccauley

## 2022-02-13 PROCEDURE — 6370000000 HC RX 637 (ALT 250 FOR IP): Performed by: NURSE PRACTITIONER

## 2022-02-13 PROCEDURE — 1240000000 HC EMOTIONAL WELLNESS R&B

## 2022-02-13 PROCEDURE — 6370000000 HC RX 637 (ALT 250 FOR IP): Performed by: PSYCHIATRY & NEUROLOGY

## 2022-02-13 RX ADMIN — FOLIC ACID 1 MG: 1 TABLET ORAL at 08:41

## 2022-02-13 RX ADMIN — Medication 100 MG: at 08:41

## 2022-02-13 RX ADMIN — LOSARTAN POTASSIUM 25 MG: 25 TABLET, FILM COATED ORAL at 08:41

## 2022-02-13 RX ADMIN — THERA TABS 1 TABLET: TAB at 08:41

## 2022-02-13 RX ADMIN — SERTRALINE HYDROCHLORIDE 25 MG: 25 TABLET ORAL at 08:41

## 2022-02-13 NOTE — GROUP NOTE
Group Therapy Note    Date: 2/12/2022    Group Start Time: 2130  Group End Time: 2215  Group Topic: Wrap-Up    [facility]    CARINA Montoya LSW        Group Therapy Note    Attendees: 10         Patient's Goal:  To participate in wrap up discussion    Notes:  Patient reported that he met his goal, to attend groups and get to know people.     Status After Intervention:  Improved    Participation Level: Interactive    Participation Quality: Appropriate      Speech:  normal      Thought Process/Content: Logical      Affective Functioning: Congruent      Mood: calm      Level of consciousness:  Alert      Response to Learning: Able to verbalize current knowledge/experience      Endings: None Reported    Modes of Intervention: Education      Discipline Responsible: /Counselor      Signature:  CARINA Montoya LSW

## 2022-02-13 NOTE — PROGRESS NOTES
[facility]. FOLLOW-UP NOTE       2/13/2022     Patient was seen and examined in person, Chart reviewed   Patient's case discussed with staff/team    Chief Complaint: Depression Alcoholism    Interim History:   Patient said he felt \"good\". He said he slept \"pretty good\" and that his appetite was \"OK\". He denied suicidal or homicidal ideation. He denied hallucinations, paranoia or other delusions. Appetite:   [x] Normal/Unchanged  [] Increased  [] Decreased      Sleep:       [x] Normal/Unchanged  [] Fair       [] Poor              Energy:    [x] Normal/Unchanged  [] Increased  [] Decreased        SI [] Present  [x] Absent    HI  []Present  [x] Absent     Aggression:  [] yes  [x] no    Patient is [] able  [x] unable to CONTRACT FOR SAFETY     PAST MEDICAL/PSYCHIATRIC HISTORY:   History reviewed. No pertinent past medical history. FAMILY/SOCIAL HISTORY:  History reviewed. No pertinent family history. Social History     Socioeconomic History    Marital status: Single     Spouse name: Not on file    Number of children: Not on file    Years of education: Not on file    Highest education level: Not on file   Occupational History    Not on file   Tobacco Use    Smoking status: Current Every Day Smoker     Packs/day: 1.00     Years: 20.00     Pack years: 20.00     Types: Cigarettes    Smokeless tobacco: Never Used   Vaping Use    Vaping Use: Never used   Substance and Sexual Activity    Alcohol use: Yes     Comment: daily rum    Drug use: No    Sexual activity: Not on file   Other Topics Concern    Not on file   Social History Narrative    Not on file     Social Determinants of Health     Financial Resource Strain:     Difficulty of Paying Living Expenses: Not on file   Food Insecurity:     Worried About Running Out of Food in the Last Year: Not on file    [place] of Food in the Last Year: Not on file   Transportation Needs:     Lack of Transportation (Medical):  Not on file    Lack of Transportation (Non-Medical): Not on file   Physical Activity:     Days of Exercise per Week: Not on file    Minutes of Exercise per Session: Not on file   Stress:     Feeling of Stress : Not on file   Social Connections:     Frequency of Communication with Friends and Family: Not on file    Frequency of Social Gatherings with Friends and Family: Not on file    Attends Rastafarian Services: Not on file    Active Member of [address] [place] [address] or Organizations: Not on file    Attends Club or Organization Meetings: Not on file    Marital Status: Not on file   Intimate Partner Violence:     Fear of Current or Ex-Partner: Not on file    Emotionally Abused: Not on file    Physically Abused: Not on file    Sexually Abused: Not on file   Housing Stability:     Unable to Pay for Housing in the Last Year: Not on file    Number of [place] in the Last Year: Not on file    Unstable Housing in the Last Year: Not on file           ROS:  [x] All negative/unchanged except if checked.  Explain positive(checked items) below:  [] Constitutional  [] Eyes  [] Ear/Nose/Mouth/Throat  [] Respiratory  [] CV  [] GI  []   [] Musculoskeletal  [] Skin/Breast  [] Neurological  [] Endocrine  [] Heme/Lymph  [] Allergic/Immunologic    Explanation:     MEDICATIONS:    Current Facility-Administered Medications:     losartan (COZAAR) tablet 25 mg, 25 mg, Oral, Daily, SHERIF Rasmussen CNP, 25 mg at 02/13/22 0841    sertraline (ZOLOFT) tablet 25 mg, 25 mg, Oral, Daily, Eduard Han MD, 25 mg at 02/13/22 0841    LORazepam (ATIVAN) tablet 0.5 mg, 0.5 mg, Oral, Q4H PRN, 0.5 mg at 02/12/22 1110 **OR** LORazepam (ATIVAN) injection 0.5 mg, 0.5 mg, IntraMUSCular, Q4H PRN **OR** LORazepam (ATIVAN) tablet 1 mg, 1 mg, Oral, Q4H PRN **OR** LORazepam (ATIVAN) injection 1 mg, 1 mg, IntraMUSCular, Q4H PRN **OR** LORazepam (ATIVAN) tablet 2 mg, 2 mg, Oral, Q2H PRN **OR** LORazepam (ATIVAN) injection 2 mg, 2 mg, IntraMUSCular, Q2H PRN **OR** LORazepam (ATIVAN) tablet 3 mg, 3 mg, Oral, Q1H PRN **OR** LORazepam (ATIVAN) injection 3 mg, 3 mg, IntraMUSCular, Q1H PRN **OR** LORazepam (ATIVAN) tablet 4 mg, 4 mg, Oral, Q1H PRN **OR** LORazepam (ATIVAN) injection 4 mg, 4 mg, IntraMUSCular, Q1H PRN, Cherise Zee MD    acetaminophen (TYLENOL) tablet 650 mg, 650 mg, Oral, Q4H PRN, Cherise Zee MD    polyethylene glycol (GLYCOLAX) packet 17 g, 17 g, Oral, Daily PRN, Cherise Zee MD    traZODone (DESYREL) tablet 50 mg, 50 mg, Oral, Nightly PRN, Cherise Zee MD    aluminum & magnesium hydroxide-simethicone (MAALOX) 200-200-20 MG/5ML suspension 30 mL, 30 mL, Oral, Q6H PRN, Cherise Zee MD, 30 mL at 02/11/22 2026    nicotine (NICODERM CQ) 21 MG/24HR 1 patch, 1 patch, TransDERmal, Daily, Cherise Zee MD, 1 patch at 02/13/22 0841    hydrOXYzine (VISTARIL) capsule 50 mg, 50 mg, Oral, Q6H PRN **OR** hydrOXYzine (VISTARIL) injection 50 mg, 50 mg, IntraMUSCular, Q6H PRN, Cherise Zee MD    haloperidol (HALDOL) tablet 5 mg, 5 mg, Oral, Q6H PRN **OR** haloperidol lactate (HALDOL) injection 5 mg, 5 mg, IntraMUSCular, Q6H PRN, Cherise Zee MD    multivitamin 1 tablet, 1 tablet, Oral, Daily, Cherise Zee MD, 1 tablet at 02/13/22 0841    thiamine tablet 100 mg, 100 mg, Oral, Daily, Cherise Zee MD, 100 mg at 64/87/13 7855    folic acid (FOLVITE) tablet 1 mg, 1 mg, Oral, Daily, Cherise Zee MD, 1 mg at 02/13/22 0841      Examination:  BP (!) 147/106   Pulse 66   Temp 98.6 °F (37 °C)   Resp 18   Ht 5' 10\" (1.778 m)   Wt 163 lb (73.9 kg)   SpO2 99%   BMI 23.39 kg/m²   Gait - steady  Medication side effects(SE): no    Mental Status Examination:    Level of consciousness:  within normal limits   Appearance:  fair grooming and fair hygiene  Behavior/Motor:  psychomotor retardation  Attitude toward examiner:  withdrawn  Speech:  slow   Mood: anxious, constricted and depressed  Affect: blunted  Thought processes: slow   Thought content: Suicidal Ideation: denies; denies homicidal ideation  Delusions:  no evidence of delusions  Perceptual Disturbance: denies any perceptual disturbance  Cognition:  oriented to person, place, and time   Concentration intact  Insight fair   Judgement fair     ASSESSMENT:   Patient symptoms are:  [x] Well controlled  [x] Improving  [] Worsening  [] No change       Diagnosis:   Active Problems:    Substance induced mood disorder ([facility] [address]  Resolved Problems:    * No resolved hospital problems. *  MDD single episode severe    LABS:    No results for input(s): WBC, HGB, PLT in the last 72 hours. No results for input(s): NA, K, CL, CO2, BUN, CREATININE, GLUCOSE in the last 72 hours. No results for input(s): BILITOT, ALKPHOS, AST, ALT in the last 72 hours. Lab Results   Component Value Date    LABAMPH Neg 02/08/2022    BARBSCNU Neg 02/08/2022    LABBENZ Neg 02/08/2022    LABMETH Neg 02/08/2022    OPIATESCREENURINE Neg 02/08/2022    PHENCYCLIDINESCREENURINE Neg 02/08/2022    ETOH 58 02/08/2022     Lab Results   Component Value Date    TSH 1.300 02/08/2022     No results found for: LITHIUM  No results found for: VALPROATE, CBMZ    RISK ASSESSMENT: high risk of suicide    Treatment Plan:  Reviewed current Medications with the patient. Medication as ordered  Risks, benefits, side effects, drug-to-drug interactions and alternatives to treatment were discussed. Collateral information:   CD evaluation  Encourage patient to attend group and other milieu activities.   Discharge planning discussed with the patient and treatment team.    PSYCHOTHERAPY/COUNSELING:  [x] Therapeutic interview  [x] Supportive  [] CBT  [] Ongoing  [] Other    [x] Patient continues to need, on a daily basis, active treatment furnished directly by or requiring the supervision of inpatient psychiatric personnel      Anticipated Length of stay:            Electronically signed by Kimberlee Leroy MD on 2/13/2022 at 3:07 PM

## 2022-02-13 NOTE — GROUP NOTE
Group Therapy Note    Date: 2/13/2022    Group Start Time: 1640  Group End Time: 5571  Group Topic: Psychoeducation    [facility]    CARINA Fields LSW        Group Therapy Note    Attendees: 17         Patient's Goal:  To participate in a Psychoeducational group therapy activity    Notes:  Patient participated in 7 mental health activities to try out during the week.     Status After Intervention:  Improved    Participation Level: Interactive    Participation Quality: Appropriate      Speech:  normal      Thought Process/Content: Logical      Affective Functioning: Congruent      Mood: calm      Level of consciousness:  Alert      Response to Learning: Able to verbalize current knowledge/experience      Endings: None Reported    Modes of Intervention: Education      Discipline Responsible: /Counselor      Signature:  CARINA Fields LSW

## 2022-02-13 NOTE — PROGRESS NOTES
Morning Community Meeting Topics    Yao Henderson attended the morning community meeting on 2/13/22. Topics discussed today     [x] Introduction   Day of the week and date   Mask distribution   Current mask requirements  [x]Teams   Explanation of  Green and Blue team criteria   Nurses assigned to each team for today   Explanation about green and blue paper  o Date  o Patient's Name  o Patient's Nurse  o Goals  [x] Visitation   Announce the visiting hours for the day   Announce which team is allowed to have visitors for the day   Review any updated Covid 19 requirements for visitors during visitation  o Vaccine Card or negative Covid test within 48 hours of visit  o State Identification   Patients are reminded to alert the  at least 1 hour before visitation   [x] Unit Orientation   Coffee use   Phone location and etiquette   Shower locations  [employer] and dryer location and process   Common area expectations   Staff rounds expectation  [x] Meals    Educate patient to the menu  o The patient is encouraged to fill out the menu to get preferences at mealtime  o The patient is educated that if they do not fill out the menu, they will get the standard tray  o The coffee pot is decaf, patient encouraged to order regular coffee from menu.    Educate patient to the meal process   Patient encouraged to eat snacks provided twice daily  o Snacks may stay in patient room     [x] Discharge Process   Discharge expectations   Fill out the survey after discharge   [x] Hygiene   Daily showers encouraged  o Showers availability discussed    Daily dressing encouraged  o Discussed wearing street clothing   Education provided on where to place linens and clothing  o Linens in the hamper  o personal clothing does not go into the linen hamper  [x] Group    Patient encouraged to attend group provided   Time of Group Meetings discussed   Gentle reminder that attendance is a Physician order  [x] Movement   Chair exercises completed   Stretching completed  Notes: GOAL :\" to feel spectacular\" Electronically signed by Rui Hansen on 2/13/2022 at 10:01 AM

## 2022-02-13 NOTE — GROUP NOTE
Group Therapy Note    Date: 2/12/2022    Group Start Time: 1900  Group End Time: 2000  Group Topic: Recreational    [facility]    CARINA Grullon LSW        Group Therapy Note    Attendees: 13         Patient's Goal:  To participate in a recreational group activity    Notes:  Patient was an active listener during group activity    Status After Intervention:  Unchanged    Participation Level:  Active Listener    Participation Quality: Appropriate      Speech:  normal      Thought Process/Content: Logical      Affective Functioning: Congruent      Mood: calm      Level of consciousness:  Alert      Response to Learning: Able to verbalize current knowledge/experience      Endings: None Reported    Modes of Intervention: Education      Discipline Responsible: /Counselor      Signature:  CARINA Grullon, ROSA

## 2022-02-13 NOTE — GROUP NOTE
Group Therapy Note    Date: 2/13/2022    Group Start Time: 4847  Group End Time: 0321  Group Topic: Group Therapy    [facility]    DINHA Gan        Group Therapy Note    Attendees: 13         Patient's Goal:  To participate in a goal oriented group. Notes:  Patient stated his goal is to feel better and maintain his gains. Status After Intervention:  Improved    Participation Level: Active Listener    Participation Quality: Appropriate      Speech:  normal      Thought Process/Content: Logical      Affective Functioning: Congruent      Mood: elevated      Level of consciousness:  Alert      Response to Learning: Able to verbalize current knowledge/experience      Endings: None Reported    Modes of Intervention: Education      Discipline Responsible: /Counselor      Signature:   DINAH Gan

## 2022-02-13 NOTE — PROGRESS NOTES
Patient out on unit, social with peers, attending groups  Sleeping better  Anxious for discharge tomorrow  Depression #4  Denies all  Patient has lots of support  Has been through rehab before

## 2022-02-13 NOTE — PROGRESS NOTES
Patient did not attend group despite staff encouragement. .Electronically signed by Rachele Rojas on 2/13/2022 at 3:11 PM

## 2022-02-14 VITALS
RESPIRATION RATE: 20 BRPM | OXYGEN SATURATION: 98 % | WEIGHT: 163 LBS | TEMPERATURE: 97.9 F | BODY MASS INDEX: 23.34 KG/M2 | DIASTOLIC BLOOD PRESSURE: 98 MMHG | HEART RATE: 77 BPM | HEIGHT: 70 IN | SYSTOLIC BLOOD PRESSURE: 140 MMHG

## 2022-02-14 PROCEDURE — 6370000000 HC RX 637 (ALT 250 FOR IP): Performed by: NURSE PRACTITIONER

## 2022-02-14 PROCEDURE — 6370000000 HC RX 637 (ALT 250 FOR IP): Performed by: PSYCHIATRY & NEUROLOGY

## 2022-02-14 PROCEDURE — 99239 HOSP IP/OBS DSCHRG MGMT >30: CPT | Performed by: PSYCHIATRY & NEUROLOGY

## 2022-02-14 RX ORDER — LOSARTAN POTASSIUM 25 MG/1
25 TABLET ORAL DAILY
Qty: 15 TABLET | Refills: 2 | Status: SHIPPED | OUTPATIENT
Start: 2022-02-15

## 2022-02-14 RX ORDER — MULTIVITAMIN WITH IRON
1 TABLET ORAL DAILY
Qty: 30 TABLET | Refills: 0 | Status: SHIPPED | OUTPATIENT
Start: 2022-02-15

## 2022-02-14 RX ADMIN — SERTRALINE HYDROCHLORIDE 25 MG: 25 TABLET ORAL at 09:04

## 2022-02-14 RX ADMIN — LOSARTAN POTASSIUM 25 MG: 25 TABLET, FILM COATED ORAL at 09:03

## 2022-02-14 RX ADMIN — Medication 100 MG: at 09:03

## 2022-02-14 RX ADMIN — FOLIC ACID 1 MG: 1 TABLET ORAL at 09:03

## 2022-02-14 RX ADMIN — THERA TABS 1 TABLET: TAB at 09:03

## 2022-02-14 NOTE — GROUP NOTE
Group Therapy Note    Date: 2/13/2022    Group Start Time: 1930  Group End Time: 2030  Group Topic: Recreational    [facility]    CARINA Flores LSW        Group Therapy Note    Attendees: 22         Patient's Goal:  To participate in a recreational activity    Notes:  Patient watched the Super Bowl and interacted with other group members.     Status After Intervention:  Improved    Participation Level: Interactive    Participation Quality: Appropriate      Speech:  normal      Thought Process/Content: Logical      Affective Functioning: Congruent      Mood: calm      Level of consciousness:  Alert      Response to Learning: Able to verbalize current knowledge/experience      Endings: None Reported    Modes of Intervention: Education      Discipline Responsible: /Counselor      Signature:  Debbi Bro, ROSA LIM

## 2022-02-14 NOTE — PROGRESS NOTES
Manual BP taken due to elevated BP this AM, /98, pt reports he is anxious r/t to discharge.  Pt did take Losartan this AM.

## 2022-02-14 NOTE — PROGRESS NOTES
Patient polite and cooperative with care. Patient is going to groups and taking medications as prescribed. Patient talks of a very long history of alcohol abuse, being a drummer in a band for years. Patient does state \"I don't need the alcohol anymore. \"  Patient does have a sad/flat affect walking the unit at times for exercise. Patient social with select peers. Patient is seen making jokes and laughing with these select peers at times. Patient denies all, no I/HI or AVH.  Electronically signed by Kulwant Shin LPN on 8/81/1608 at 51:32 AM

## 2022-02-14 NOTE — GROUP NOTE
Group Therapy Note    Date: 2/14/2022    Group Start Time: 1000  Group End Time: 1050  Group Topic: Psychoeducation    [facility] MAURICE Campbell        Group Therapy Note    Attendees: 13         Patient's Goal:  \"To go home, stay sober, be kind to my family and others. Notes:  Patient attended the 1000 skills group. Patient was calm and he work well on his task in group. Status After Intervention:  Improved    Participation Level:  Active Listener    Participation Quality: Appropriate      Speech:  normal      Thought Process/Content: Linear      Affective Functioning: Congruent      Mood: calm      Level of consciousness:  Alert      Response to Learning: Able to retain information      Endings: None Reported    Modes of Intervention: Education, Socialization and Activity      Discipline Responsible: Psychoeducational Specialist      Signature:  Ginger Campbell

## 2022-02-14 NOTE — PROGRESS NOTES
CLINICAL PHARMACY NOTE: MEDS TO BEDS    Total # of Prescriptions Filled: 2   The following medications were delivered to the patient:  · Losartan 25 mg Tab  · Sertraline 50 mg Tab    Additional Documentation:

## 2022-02-14 NOTE — DISCHARGE SUMMARY
DISCHARGE SUMMARY      Patient ID:  Susan Dye  [id number]  63 y.o.  1960      Admit date: 2/8/2022    Discharge date and time: 2/14/2022    Admitting Physician: Shelley Dumont MD     Discharge Physician: Dr Jaz Scott MD    Admission Diagnoses: Substance induced mood disorder [facility]) [F19.94]    Admission Condition: poor    Discharged Condition: stable    Admission Circumstance:     67yo male BIB self for SI no plan. Patient reports stressors as relapsed in drinking, lost his job, and his x-wife  his best friend. Patient reports no plan \"Jagruti never had the thoughts of no living. \" H/O rehab and sober for 15yrs in the past. Patient affect is sad/flat, tearful at times. Patient denies HI and AVH. Endorses high depression and mild anxiety. On unit patient has been calm and cooperative     HISTORY OF PRESENT ILLNESS:       The patient is a 63 y.o. male engaged living with [facility], unemployed recently lost his job, with significant past history of alcoholism     Pt report that he has been feeling depressed and suicidal  Family came to him and asked him to seek help. Pt has been drinking a lot - about 3 of the 1/2 gallon Rum per week. Weekend drink from morning to night. When he was working he drank after work.   Pt started drinking 15 years ago after divorce on and off more so binging  Escalated to daily drinking for 2 years  Some days he goes without drinking  Has been to rehab in [facility] - 5 years ago  Pt is currently going through withdrawal  Never had any seizures     Depression Severity: Rating mood to be around 3/10 (10- good)  Quality:melancholic  Worse all day  Content: Hopeless, worthless and helpless feeling  Suicidal thoughts - passive death wishes- rather be dead, gets worse when he drink more  Associated symptoms:  Poor concentration, anhedonia, decrease motivation  Sleep and appetite- poor     Stressors:losing job, ex wife  his best friend  2 children - 32 and 25 y/o - no close contact, not seen grand kids for a while  The patient is not currently receiving care for the above psychiatric illness.     Medications Prior to Admission:   Prescriptions Prior to Admission   No medications prior to admission.        Compliance:no     Psychiatric Review of Systems       Depression: yes     Shirley or Hypomania:  no     Panic Attacks:  yes     Phobias:  no     Obsessions and Compulsions:  no     PTSD : no     Hallucinations:  no     Delusions:  Paranoid ideation     Substance Abuse History:  ETOH: yes as above  Marijuana: no  Opiates: no  Other Drugs: no        Past Psychiatric History:  Prior Diagnosis:  MDD, alcoholism  Psychiatrist: No   Therapist:no   Hospitalization: no  Hx of Suicidal Attempts: no  Hx of violence:  no  ECT: no  Previous discontinued Psychiatric Med Trials: no        PAST MEDICAL/PSYCHIATRIC HISTORY:   History reviewed. No pertinent past medical history. FAMILY/SOCIAL HISTORY:  History reviewed. No pertinent family history.   Social History     Socioeconomic History    Marital status: Single     Spouse name: Not on file    Number of children: Not on file    Years of education: Not on file    Highest education level: Not on file   Occupational History    Not on file   Tobacco Use    Smoking status: Current Every Day Smoker     Packs/day: 1.00     Years: 20.00     Pack years: 20.00     Types: Cigarettes    Smokeless tobacco: Never Used   Vaping Use    Vaping Use: Never used   Substance and Sexual Activity    Alcohol use: Yes     Comment: daily rum    Drug use: No    Sexual activity: Not on file   Other Topics Concern    Not on file   Social History Narrative    Not on file     Social Determinants of Health     Financial Resource Strain:     Difficulty of Paying Living Expenses: Not on file   Food Insecurity:     Worried About Running Out of Food in the Last Year: Not on file    [place] of Food in the Last Year: Not on file   Transportation Needs:     Lack of Transportation (Medical): Not on file    Lack of Transportation (Non-Medical):  Not on file   Physical Activity:     Days of Exercise per Week: Not on file    Minutes of Exercise per Session: Not on file   Stress:     Feeling of Stress : Not on file   Social Connections:     Frequency of Communication with Friends and Family: Not on file    Frequency of Social Gatherings with Friends and Family: Not on file    Attends Nondenominational Services: Not on file    Active Member of [address] [place] [address] or Organizations: Not on file    Attends Club or Organization Meetings: Not on file    Marital Status: Not on file   Intimate Partner Violence:     Fear of Current or Ex-Partner: Not on file    Emotionally Abused: Not on file    Physically Abused: Not on file    Sexually Abused: Not on file   Housing Stability:     Unable to Pay for Housing in the Last Year: Not on file    Number of [place] in the Last Year: Not on file    Unstable Housing in the Last Year: Not on file       MEDICATIONS:    Current Facility-Administered Medications:     [START ON 2/15/2022] sertraline (ZOLOFT) tablet 50 mg, 50 mg, Oral, Daily, Augustine Gonzalez MD    losartan (COZAAR) tablet 25 mg, 25 mg, Oral, Daily, Jeannie Resendiz, APRN - CNP, 25 mg at 02/14/22 7792    acetaminophen (TYLENOL) tablet 650 mg, 650 mg, Oral, Q4H PRN, Augustine Gonzalez MD    polyethylene glycol (GLYCOLAX) packet 17 g, 17 g, Oral, Daily PRN, Augustine Gonzalez MD    traZODone (DESYREL) tablet 50 mg, 50 mg, Oral, Nightly PRN, Augustine Gonzalez MD    aluminum & magnesium hydroxide-simethicone (MAALOX) 200-200-20 MG/5ML suspension 30 mL, 30 mL, Oral, Q6H PRN, Augustine Gonzalez MD, 30 mL at 02/11/22 2026    nicotine (NICODERM CQ) 21 MG/24HR 1 patch, 1 patch, TransDERmal, Daily, Augustine Gonzalez MD, 1 patch at 02/14/22 0903    hydrOXYzine (VISTARIL) capsule 50 mg, 50 mg, Oral, Q6H PRN **OR** hydrOXYzine (VISTARIL) injection 50 mg, 50 mg, IntraMUSCular, Q6H PRN, Carlos Thorpe Marry Hernandez MD    haloperidol (HALDOL) tablet 5 mg, 5 mg, Oral, Q6H PRN **OR** haloperidol lactate (HALDOL) injection 5 mg, 5 mg, IntraMUSCular, Q6H PRN, Edith Quintana MD    multivitamin 1 tablet, 1 tablet, Oral, Daily, Edith Quintana MD, 1 tablet at 02/14/22 7396    thiamine tablet 100 mg, 100 mg, Oral, Daily, Edith Quintana MD, 100 mg at 59/19/97 3503    folic acid (FOLVITE) tablet 1 mg, 1 mg, Oral, Daily, Edith Quintana MD, 1 mg at 02/14/22 1971    Examination:  BP (!) 143/110   Pulse 77   Temp 97.9 °F (36.6 °C) (Oral)   Resp 20   Ht 5' 10\" (1.778 m)   Wt 163 lb (73.9 kg)   SpO2 98%   BMI 23.39 kg/m²   Gait - steady    HOSPITAL COURSE[de-identified]  Following admission to the hospital, patient had a complete physical exam and blood work up  Patient was monitored closely with suicide precaution  Patient was started on meds as listed below  Was encouraged to participate in group and other milieu activity  Patient started to feel better with this combination of treatment. Significant progress in the symptoms since admission. Mood better, with the score of 2/10 - bad  No AVH or paranoid thoughts  No Hopeless or worthless feeling  No active SI/HI  Appetite:  [x] Normal  [] Increased  [] Decreased    Sleep:       [x] Normal  [] Fair       [] Poor            Energy:    [x] Normal  [] Increased  [] Decreased     SI [] Present  [x] Absent  HI  []Present  [x] Absent   Aggression:  [] yes  [] no  Patient is [x] able  [] unable to CONTRACT FOR SAFETY   Medication side effects(SE):  [x] None(Psych.  Meds.) [] Other      Mental Status Examination on discharge:    Level of consciousness:  within normal limits   Appearance:  well-appearing  Behavior/Motor:  no abnormalities noted  Attitude toward examiner:  attentive and good eye contact  Speech:  spontaneous, normal rate and normal volume   Mood: euthymic  Affect:  mood congruent  Thought processes:  goal directed   Thought content:  Suicidal Ideation:  denies suicidal ideation  Delusions:  no evidence of delusions  Perceptual Disturbance:  denies any perceptual disturbance  Cognition:  oriented to person, place, and time   Concentration intact  Memory intact  Insight good   Judgement fair   Fund of Knowledge adequate      ASSESSMENT:  Patient symptoms are:  [x] Well controlled  [x] Improving  [] Worsening  [] No change      Diagnosis:  Active Problems:    Substance induced mood disorder ([facility] [address]  Resolved Problems:    * No resolved hospital problems. *      LABS:    No results for input(s): WBC, HGB, PLT in the last 72 hours. No results for input(s): NA, K, CL, CO2, BUN, CREATININE, GLUCOSE in the last 72 hours. No results for input(s): BILITOT, ALKPHOS, AST, ALT in the last 72 hours. Lab Results   Component Value Date    LABAMPH Neg 02/08/2022    BARBSCNU Neg 02/08/2022    LABBENZ Neg 02/08/2022    LABMETH Neg 02/08/2022    OPIATESCREENURINE Neg 02/08/2022    PHENCYCLIDINESCREENURINE Neg 02/08/2022    ETOH 58 02/08/2022     Lab Results   Component Value Date    TSH 1.300 02/08/2022     No results found for: LITHIUM  No results found for: VALPROATE, CBMZ    RISK ASSESSMENT AT DISCHARGE: Low risk for suicide and homicide. Treatment Plan:  Reviewed current Medications with the patient. Education provided on the complaince with treatment. Risks, benefits, side effects, drug-to-drug interactions and alternatives to treatment were discussed. Encourage patient to attend outpatient follow up appointment and therapy. Patient was advised to call the outpatient provider, visit the nearest ED or call 911 if symptoms are not manageable. Patient's family member was contacted prior to the discharge.          Medication List      START taking these medications    losartan 25 MG tablet  Commonly known as: COZAAR  Take 1 tablet by mouth daily  Start taking on: February 15, 2022     multivitamin Tabs tablet  Take 1 tablet by mouth daily  Start taking on: February 15, 2022     sertraline 50 MG tablet  Commonly known as: ZOLOFT  Take 1 tablet by mouth daily  Start taking on: February 15, 2022           Where to Get Your Medications      You can get these medications from any pharmacy    Bring a paper prescription for each of these medications  · losartan 25 MG tablet  · multivitamin Tabs tablet  · sertraline 50 MG tablet           Reason for more than one antipsychotic:   [x] N/A  [] 3 failed monotherapy(drugs tried):  [] Cross over to a new antipsychotic  [] Taper to monotherapy from polypharmacy  [] Augmentation of Clozapine therapy due to treatment resistance to single therapy        TIME SPEND - 35 MINUTES TO COMPLETE THE EVALUATION, DISCHARGE SUMMARY, MEDICATION RECONCILIATION AND FOLLOW UP CARE     Signed:  Riaz Saldaña MD  2/14/2022  10:48 AM

## 2022-02-14 NOTE — PROGRESS NOTES
Morning Community Meeting Topics    Ted Hood attended the morning community meeting on 2/14/22. Topics discussed today     [x] Introduction   Day of the week and date   Mask distribution   Current mask requirements  [x]Teams   Explanation of  Green and Blue team criteria   Nurses assigned to each team for today   Explanation about green and blue paper  o Date  o Patient's Name  o Patient's Nurse  o Goals  [x] Visitation   Announce the visiting hours for the day   Announce which team is allowed to have visitors for the day   Review any updated Covid 19 requirements for visitors during visitation  o Vaccine Card or negative Covid test within 48 hours of visit  o State Identification   Patients are reminded to alert the  at least 1 hour before visitation   [x] Unit Orientation   Coffee use   Phone location and etiquette   Shower locations  [employer] and dryer location and process   Common area expectations   Staff rounds expectation  [x] Meals    Educate patient to the menu  o The patient is encouraged to fill out the menu to get preferences at mealtime  o The patient is educated that if they do not fill out the menu, they will get the standard tray  o The coffee pot is decaf, patient encouraged to order regular coffee from menu.    Educate patient to the meal process   Patient encouraged to eat snacks provided twice daily  o Snacks may stay in patient room     [x] Discharge Process   Discharge expectations   Fill out the survey after discharge   [x] Hygiene   Daily showers encouraged  o Showers availability discussed    Daily dressing encouraged  o Discussed wearing street clothing   Education provided on where to place linens and clothing  o Linens in the hamper  o personal clothing does not go into the linen hamper  [x] Group    Patient encouraged to attend group provided   Time of Group Meetings discussed   Gentle reminder that attendance is a Physician order  [x] Movement   Chair exercises completed   Stretching completed  Notes:Goal - \"To go home, stay sober, be kind to my family and others\" Electronically signed by Rochelle García, [address] on 2/14/2022 at 10:01 AM

## 2023-04-24 ENCOUNTER — APPOINTMENT (OUTPATIENT)
Dept: GENERAL RADIOLOGY | Age: 63
End: 2023-04-24

## 2023-04-24 ENCOUNTER — HOSPITAL ENCOUNTER (EMERGENCY)
Age: 63
Discharge: HOME | End: 2023-04-24

## 2023-04-24 VITALS
WEIGHT: 160 LBS | OXYGEN SATURATION: 98 % | RESPIRATION RATE: 16 BRPM | HEIGHT: 70 IN | SYSTOLIC BLOOD PRESSURE: 135 MMHG | HEART RATE: 92 BPM | TEMPERATURE: 97.7 F | BODY MASS INDEX: 22.9 KG/M2 | DIASTOLIC BLOOD PRESSURE: 98 MMHG

## 2023-04-24 DIAGNOSIS — M25.562 LEFT KNEE PAIN, UNSPECIFIED CHRONICITY: ICD-10-CM

## 2023-04-24 DIAGNOSIS — M51.36 DDD (DEGENERATIVE DISC DISEASE), LUMBAR: Primary | ICD-10-CM

## 2023-04-24 DIAGNOSIS — M79.672 LEFT FOOT PAIN: ICD-10-CM

## 2023-04-24 PROCEDURE — 73630 X-RAY EXAM OF FOOT: CPT

## 2023-04-24 PROCEDURE — 73564 X-RAY EXAM KNEE 4 OR MORE: CPT

## 2023-04-24 PROCEDURE — 72100 X-RAY EXAM L-S SPINE 2/3 VWS: CPT

## 2023-04-24 PROCEDURE — 99283 EMERGENCY DEPT VISIT LOW MDM: CPT

## 2023-04-24 PROCEDURE — 6370000000 HC RX 637 (ALT 250 FOR IP)

## 2023-04-24 RX ORDER — PREDNISONE 20 MG/1
40 TABLET ORAL DAILY
Qty: 14 TABLET | Refills: 0 | Status: SHIPPED | OUTPATIENT
Start: 2023-04-24 | End: 2023-05-01

## 2023-04-24 RX ORDER — LOSARTAN POTASSIUM 25 MG/1
25 TABLET ORAL DAILY
Qty: 15 TABLET | Refills: 0 | Status: SHIPPED | OUTPATIENT
Start: 2023-04-24

## 2023-04-24 RX ORDER — TRAMADOL HYDROCHLORIDE 50 MG/1
50 TABLET ORAL EVERY 8 HOURS PRN
Qty: 12 TABLET | Refills: 0 | Status: SHIPPED | OUTPATIENT
Start: 2023-04-24 | End: 2023-04-27

## 2023-04-24 RX ORDER — TRAMADOL HYDROCHLORIDE 50 MG/1
50 TABLET ORAL ONCE
Status: COMPLETED | OUTPATIENT
Start: 2023-04-24 | End: 2023-04-24

## 2023-04-24 RX ADMIN — TRAMADOL HYDROCHLORIDE 50 MG: 50 TABLET, COATED ORAL at 14:53

## 2023-04-24 ASSESSMENT — ENCOUNTER SYMPTOMS
VOMITING: 0
ABDOMINAL PAIN: 0
DIARRHEA: 0
COUGH: 0
SHORTNESS OF BREATH: 0
SORE THROAT: 0
NAUSEA: 0
BACK PAIN: 1

## 2023-04-24 ASSESSMENT — PAIN DESCRIPTION - ORIENTATION
ORIENTATION: LEFT
ORIENTATION: LOWER

## 2023-04-24 ASSESSMENT — PAIN DESCRIPTION - LOCATION
LOCATION: FOOT;LEG
LOCATION: BACK

## 2023-04-24 ASSESSMENT — PAIN - FUNCTIONAL ASSESSMENT
PAIN_FUNCTIONAL_ASSESSMENT: 0-10
PAIN_FUNCTIONAL_ASSESSMENT: 0-10

## 2023-04-24 ASSESSMENT — PAIN SCALES - GENERAL
PAINLEVEL_OUTOF10: 4
PAINLEVEL_OUTOF10: 4

## 2023-04-24 ASSESSMENT — PAIN DESCRIPTION - PAIN TYPE
TYPE: ACUTE PAIN
TYPE: ACUTE PAIN

## 2023-04-24 ASSESSMENT — PAIN DESCRIPTION - FREQUENCY
FREQUENCY: CONTINUOUS
FREQUENCY: CONTINUOUS

## 2023-04-24 NOTE — ED PROVIDER NOTES
[address] [facility] ED  eMERGENCYdEPARTMENT eNCOUnter      Pt Name: Alicia Crespo  MRN: [id number]  [place] 1960of evaluation: 4/24/2023  Provider:SHERIF Read CNP    CHIEF COMPLAINT       Chief Complaint   Patient presents with    Leg Pain     Left leg sciatica pain was gone for a long time now it has returned     Foot Pain     Left foot         HISTORY OF PRESENT ILLNESS  (Location/Symptom, Timing/Onset, Context/Setting, Quality, Duration, Modifying Factors, Severity.)   Alicia Crespo is a 63 y.o. male hx of anxiety, back pain, who presents to the emergency department with left leg pain. Patient states he has a hx of chronic lower back pain, that is irritating him of the past week. He jumped out of a truck bed about  1 month ago and landed on rock, he continues to have pain to the bottom of his foot that radiates up to his left knee. He is not established with a PCP, and denies any hx of arthritis. He has not  taken anything for his pain. He rates his left sided foot and knee pain a 4/10 ache worse with movement. He denies any CP, SOB, fever, chills, nausea, emesis, abdominal pain, headache, recent illness. HPI    Nursing Notes were reviewed and I agree. REVIEW OF SYSTEMS    (2-9 systems for level 4, 10 or more for level 5)     Review of Systems   Constitutional:  Negative for activity change, chills and fever. HENT:  Negative for ear pain and sore throat. Eyes:  Negative for visual disturbance. Respiratory:  Negative for cough and shortness of breath. Cardiovascular:  Negative for chest pain, palpitations and leg swelling. Gastrointestinal:  Negative for abdominal pain, diarrhea, nausea and vomiting. Genitourinary:  Negative for dysuria. Musculoskeletal:  Positive for arthralgias (left knee, left foot) and back pain. Skin:  Negative for rash. Neurological:  Negative for dizziness and weakness. Psychiatric/Behavioral:  The patient is nervous/anxious.        as noted

## 2023-05-08 ENCOUNTER — OFFICE VISIT (OUTPATIENT)
Dept: PRIMARY CARE CLINIC | Age: 63
End: 2023-05-08

## 2023-05-08 VITALS
HEIGHT: 69 IN | HEART RATE: 110 BPM | OXYGEN SATURATION: 98 % | WEIGHT: 160 LBS | TEMPERATURE: 97.5 F | BODY MASS INDEX: 23.7 KG/M2 | DIASTOLIC BLOOD PRESSURE: 100 MMHG | SYSTOLIC BLOOD PRESSURE: 140 MMHG

## 2023-05-08 DIAGNOSIS — Z00.00 ROUTINE ADULT HEALTH MAINTENANCE: Primary | ICD-10-CM

## 2023-05-08 DIAGNOSIS — I10 ESSENTIAL (PRIMARY) HYPERTENSION: ICD-10-CM

## 2023-05-08 DIAGNOSIS — Z11.4 SCREENING FOR HIV WITHOUT PRESENCE OF RISK FACTORS: ICD-10-CM

## 2023-05-08 DIAGNOSIS — F33.1 MODERATE EPISODE OF RECURRENT MAJOR DEPRESSIVE DISORDER (HCC): ICD-10-CM

## 2023-05-08 DIAGNOSIS — Z11.59 NEED FOR HEPATITIS C SCREENING TEST: ICD-10-CM

## 2023-05-08 DIAGNOSIS — Z23 NEED FOR PROPHYLACTIC VACCINATION AND INOCULATION AGAINST VARICELLA: ICD-10-CM

## 2023-05-08 PROCEDURE — 3080F DIAST BP >= 90 MM HG: CPT | Performed by: STUDENT IN AN ORGANIZED HEALTH CARE EDUCATION/TRAINING PROGRAM

## 2023-05-08 PROCEDURE — 3077F SYST BP >= 140 MM HG: CPT | Performed by: STUDENT IN AN ORGANIZED HEALTH CARE EDUCATION/TRAINING PROGRAM

## 2023-05-08 PROCEDURE — 99203 OFFICE O/P NEW LOW 30 MIN: CPT | Performed by: STUDENT IN AN ORGANIZED HEALTH CARE EDUCATION/TRAINING PROGRAM

## 2023-05-08 RX ORDER — OMEPRAZOLE 40 MG/1
40 CAPSULE, DELAYED RELEASE ORAL
Qty: 30 CAPSULE | Refills: 1 | Status: SHIPPED | OUTPATIENT
Start: 2023-05-08

## 2023-05-08 RX ORDER — SERTRALINE HYDROCHLORIDE 25 MG/1
25 TABLET, FILM COATED ORAL DAILY
Qty: 90 TABLET | Refills: 1 | Status: SHIPPED | OUTPATIENT
Start: 2023-05-08

## 2023-05-08 RX ORDER — LOSARTAN POTASSIUM 25 MG/1
25 TABLET ORAL DAILY
Qty: 30 TABLET | Refills: 5 | Status: SHIPPED | OUTPATIENT
Start: 2023-05-08

## 2023-05-08 SDOH — ECONOMIC STABILITY: HOUSING INSECURITY
IN THE LAST 12 MONTHS, WAS THERE A TIME WHEN YOU DID NOT HAVE A STEADY PLACE TO SLEEP OR SLEPT IN A SHELTER (INCLUDING NOW)?: NO

## 2023-05-08 SDOH — ECONOMIC STABILITY: FOOD INSECURITY: WITHIN THE PAST 12 MONTHS, THE FOOD YOU BOUGHT JUST DIDN'T LAST AND YOU DIDN'T HAVE MONEY TO GET MORE.: NEVER TRUE

## 2023-05-08 SDOH — ECONOMIC STABILITY: FOOD INSECURITY: WITHIN THE PAST 12 MONTHS, YOU WORRIED THAT YOUR FOOD WOULD RUN OUT BEFORE YOU GOT MONEY TO BUY MORE.: NEVER TRUE

## 2023-05-08 SDOH — ECONOMIC STABILITY: INCOME INSECURITY: HOW HARD IS IT FOR YOU TO PAY FOR THE VERY BASICS LIKE FOOD, HOUSING, MEDICAL CARE, AND HEATING?: NOT VERY HARD

## 2023-05-08 ASSESSMENT — PATIENT HEALTH QUESTIONNAIRE - PHQ9
2. FEELING DOWN, DEPRESSED OR HOPELESS: 0
SUM OF ALL RESPONSES TO PHQ QUESTIONS 1-9: 0
1. LITTLE INTEREST OR PLEASURE IN DOING THINGS: 0
SUM OF ALL RESPONSES TO PHQ9 QUESTIONS 1 & 2: 0

## 2023-05-08 NOTE — PROGRESS NOTES
5/8/2023        Patsy Bonilla 1960 is a 63 y.o. male who presents today with:  Chief Complaint   Patient presents with    Establish Care     Patient here today to establish care     Leg Pain     Left leg feels painful        HPI:   Adriel Duron presents today to establish care. He has PMHx of Depression and HTN  He states he has been an alcoholic, and he has been in [place] for 15 years. His most friend was diagnosed with stage IV lung cancer and he is depressed about having to go see him as he feels bad for not visiting him earlier. He has been struggling with stress and depression, he does not have a job, he does not have insurance, and he stopped taking his Zoloft many years ago. He denies any homicidal or suicidal ideations, paranoia. He was seen in the ER about a 3 weeks ago due to left leg pain and all XR s are negative. He has shooting pain down his left leg from time to time. He denies any other problems or complaints today. Assessment & Plan   1. Routine adult health maintenance  -     Hemoglobin A1C; Future  2. Essential (primary) hypertension  3. Screening for HIV without presence of risk factors  -     HIV Screen; Future  4. Need for hepatitis C screening test  -     Hepatitis C Antibody; Future  5. Need for prophylactic vaccination and inoculation against varicella  6. Moderate episode of recurrent major depressive disorder (HCC)  -     sertraline (ZOLOFT) 25 MG tablet; Take 1 tablet by mouth daily, Disp-90 tablet, R-1Normal     Medications Discontinued During This Encounter   Medication Reason    sertraline (ZOLOFT) 50 MG tablet Therapy completed    losartan (COZAAR) 25 MG tablet REORDER     Return in about 6 months (around 11/8/2023).     Start sertraline 25 mg daily, refill losartan 5 mg daily, started omeprazole 40 mg daily, advised he will need PT referral for possible sciatica and MRI, as well as endoscopy in 2 months    Objective  No Known Allergies  Current Outpatient Medications

## 2023-05-08 NOTE — PATIENT INSTRUCTIONS
Started sertraline 25 mg daily for depression  Started omeprazole 40 mg daily before breakfast for acid reflux. You should take 8 weeks of this medication and then have an endoscopy. If you are open to an endoscopy, I can place a referral for you. Please let me know. Refilled losartan 25 mg daily for blood pressure  For your leg pain, please take Tylenol and let me know if you would like a referral for physical therapy as her pain seems to be due to sciatica. You will need an MRI to diagnose this however an MRI would not fix the problem, physical therapy will hopefully. Please schedule a follow-up in 2 months to assess your depression, acid reflux, blood pressure, and to complete the rest of her health maintenance.

## 2023-05-26 ENCOUNTER — TELEPHONE (OUTPATIENT)
Dept: PRIMARY CARE CLINIC | Age: 63
End: 2023-05-26

## 2023-06-06 ENCOUNTER — TELEPHONE (OUTPATIENT)
Dept: PRIMARY CARE CLINIC | Age: 63
End: 2023-06-06

## 2023-06-06 DIAGNOSIS — M51.36 DDD (DEGENERATIVE DISC DISEASE), LUMBAR: Primary | ICD-10-CM

## 2023-06-06 NOTE — TELEPHONE ENCOUNTER
----- Message from Zahida Echols sent at 6/2/2023 12:59 PM EDT -----  Subject: Referral Request    Reason for referral request? MRI  Provider patient wants to be referred to(if known):     Provider Phone Number(if known): Additional Information for Provider? Patient was told he needed an MRI.    Please call once placed so he an schedule   ---------------------------------------------------------------------------  --------------  Marilyn RAMIREZ    [phone]; OK to leave message on voicemail  ---------------------------------------------------------------------------  --------------

## 2023-07-03 ENCOUNTER — HOSPITAL ENCOUNTER (OUTPATIENT)
Dept: PHYSICAL THERAPY | Age: 63
Setting detail: THERAPIES SERIES
Discharge: HOME | End: 2023-07-03
Payer: COMMERCIAL

## 2023-07-03 PROCEDURE — 97110 THERAPEUTIC EXERCISES: CPT

## 2023-07-03 PROCEDURE — 97162 PT EVAL MOD COMPLEX 30 MIN: CPT

## 2023-07-03 ASSESSMENT — PAIN SCALES - GENERAL: PAINLEVEL_OUTOF10: 0

## 2023-07-03 ASSESSMENT — PAIN DESCRIPTION - ORIENTATION: ORIENTATION: LEFT;LOWER

## 2023-07-03 ASSESSMENT — PAIN DESCRIPTION - DESCRIPTORS: DESCRIPTORS: ACHING;SHARP;SHOOTING;BURNING

## 2023-07-03 ASSESSMENT — PAIN DESCRIPTION - PAIN TYPE: TYPE: CHRONIC PAIN

## 2023-07-03 ASSESSMENT — PAIN DESCRIPTION - LOCATION: LOCATION: BACK;LEG;FOOT

## 2023-07-03 NOTE — THERAPY EVALUATION
[id number] [facility]     Ph: [phone]  Fax: [phone]    [x] Certification  [] Recertification [x]  Plan of Care  [] Progress Note [] Discharge      Referring Provider: Cassandra Michel MD    From:  Blanca Gardner, PT,DPT  Patient: Juanita Cedillo (43 y.o. male) : 1960 Date: 2023   Medical Diagnosis: DDD (degenerative disc disease), lumbar [M51.36]    Treatment Diagnosis: LBP, reduced strength, reduced mobility, reduced flexibility, difficulty ambulating, reduced standing tolerance    Plan of Care/Certification Expiration Date: 10/01/23   Progress Report Period from: 7/3/2023  to 7/3/2023    Visits to Date: 1 No Show: 0 Cancelled Appts: 0    OBJECTIVE:   Short Term Goals - Time Frame for Short Term Goals: 2-3 weeks    Goals Current/Discharge status  Status   Short Term Goal 1: patient will be indep with HEP to self-manage symptoms upon d/c  Administered with patient understanding New   Short Term Goal 2: patient will improve 5xSTS to </=12 seconds to demonstrate improved functional strength and ease of transitional movements  5 Times Sit to Stand  5 TIMES SIT TO STAND: 24.3 seconds   New     Long Term Goals - Time Frame for Long Term Goals : 4-6 weeks  Goals Current/ Discharge status Status   Long Term Goal 1: patient will improve garcía LE strength to >/=4+/5 for improvements in ambulation tolerance Strength LLE  L Hip Flexion: 4/5  L Hip Extension: 4/5  L Hip ABduction: 3+/5  L Hip ADduction: 3/5  L Knee Flexion: 5/5  L Knee Extension: 4+/5  L Ankle Dorsiflexion: 4+/5  Strength RLE  R Hip Flexion: 4/5  R Hip Extension: 4/5  R Hip ABduction: 4-/5  R Hip ADduction: 3/5  R Knee Flexion: 5/5  R Knee Extension: 5/5  R Ankle Dorsiflexion: 5/5  New   Long Term Goal 2: patient will ambulate community distances with LRD and improved LLE WBing, step-length and reduced pain for increased functional

## 2023-07-03 NOTE — PROGRESS NOTES
Hip ABduction: 3+/5  L Hip ADduction: 3/5  L Knee Flexion: 5/5  L Knee Extension: 4+/5  L Ankle Dorsiflexion: 4+/5    Strength RLE  R Hip Flexion: 4/5  R Hip Extension: 4/5  R Hip ABduction: 4-/5  R Hip ADduction: 3/5  R Knee Flexion: 5/5  R Knee Extension: 5/5  R Ankle Dorsiflexion: 5/5     Lumbar Assessment     AROM Lumbar Spine   Lumbar Spine AROM : WFL     Muscle Length/Flexibility:   Muscle Length LE  Right Psoas / Iliacus: WNL  Right Hamstrings: Tight  Right Rectus Femoris: WNL  Left Psoas / Iliacus: WNL  Left Hamstrings: Tight  Left Rectus Femoris: WNL    Special Tests:   Special Tests Lumbar Spine  SLR: R (-), L (-)  Slump Test: R (-), L (-)    Outcomes Score:  Exam: BRODY: will administer NV ; 5xSTS: 24.3 sec ; TU sec    Treatment:    Exercises:   Exercises  Exercise 1: SLR x10 reps, garcía  Exercise 2: hamstring str with strap 3 reps x 20\" holds, garcía  Exercise 3: calf str with strap 3 reps x 20\" holds, garcía  Exercise 4: hamstring sets on mat 10 reps x 5\" holds, garcía  Exercise 5: sciatic nerve glide*  Exercise 6: heel raise progression*  Exercise 7: LTR*  Exercise 8: bridge*  Exercise 9: H/L march*  Exercise 10: sink exercises*  Exercise 11: step-ups*  Exercise 12: STS*  Exercise 13: DKTC*  Exercise 14: steamboats*  Exercise 15: paloff press*  Exercise 20: HEP: SLR, ham str, calf str, heel sets     Modalities:  Modalities:  (HP vs CP* estim*)     Manual:  Manual Therapy  Manual Traction: leg pulls*  Soft Tissue Mobilizaton: lumbar*  Other: cupping*     *Indicates exercise,modality, or manual techniques to be initiated when appropriate  ASSESSMENT     Impression: Assessment: Patient is a 65 yo male presenting to skilled PT for chronic radiating LBP. Patient reports radicular symptoms, however insignificant SLR and slump test. Patient educated on DVT s/s due to patient reporting intermittent calf pain, and reduced activity level.  Patient presented with reduced strength, decreased hamstring flexibility, gait

## 2023-07-05 ENCOUNTER — HOSPITAL ENCOUNTER (OUTPATIENT)
Dept: PHYSICAL THERAPY | Age: 63
Setting detail: THERAPIES SERIES
Discharge: HOME | End: 2023-07-05
Payer: COMMERCIAL

## 2023-07-05 PROCEDURE — 97110 THERAPEUTIC EXERCISES: CPT

## 2023-07-05 PROCEDURE — G0283 ELEC STIM OTHER THAN WOUND: HCPCS

## 2023-07-05 PROCEDURE — 97140 MANUAL THERAPY 1/> REGIONS: CPT

## 2023-07-05 ASSESSMENT — PAIN SCALES - GENERAL: PAINLEVEL_OUTOF10: 3

## 2023-07-05 ASSESSMENT — PAIN DESCRIPTION - ORIENTATION: ORIENTATION: LEFT;LOWER

## 2023-07-05 ASSESSMENT — PAIN DESCRIPTION - LOCATION: LOCATION: BACK;FOOT

## 2023-07-05 ASSESSMENT — PAIN DESCRIPTION - DESCRIPTORS: DESCRIPTORS: NUMBNESS;TINGLING

## 2023-07-05 NOTE — PROGRESS NOTES
[facility]  Outpatient Physical Therapy   Treatment Note        Date: 2023  Patient: Susy Piña  : 1960   Confirmed: Yes  MRN: [id number]  Referring Provider: Jordy Trevizo MD      Medical Diagnosis: DDD (degenerative disc disease), lumbar [M51.36]      Treatment Diagnosis: LBP, reduced strength, reduced mobility, reduced flexibility, difficulty ambulating, reduced standing tolerance    Visit Information:  Insurance: Payor: Alverto Sewell [place] / Plan: Halley Parkinson / Product Type: *No Product type* /   PT Visit Information  Onset Date:  (3-4 months)  PT Insurance Information: [employer]  Total # of Visits Approved: 29 (+evaluation)  Total # of Visits to Date: 2  Plan of Care/Certification Expiration Date: 10/01/23  No Show: 0  Progress Note Due Date: 23  Canceled Appointment: 0  Progress Note Counter: -10    Subjective Information:  Subjective: Pt reports NT left foot & LB 3/10. \"I feel so shaky\". HEP Compliance:  [x] Good [] Fair [] Poor [] Reports not doing due to:               Pain Screening  Patient Currently in Pain: Yes  Pain Level: 3  Pain Location: Back, Foot  Pain Orientation: Left, Lower  Pain Descriptors: Numbness, Tingling    Treatment:  Exercises:  Exercises  Exercise 1: SLR x15 reps, garcía  Exercise 2: hamstring str with strap 3 reps x 20\" holds, garcía  Exercise 3: calf str with strap 3 reps x 20\" holds, garcía  Exercise 4: hamstring sets on mat 10 reps x 5\" holds, garcía  Exercise 5: sciatic nerve glide L x 10  Exercise 7: LTR 5 sec x 10  Exercise 9: H/L march x 10 B  Exercise 13: DKTC 20 sec x 3  Exercise 16: BRODY       Modalities:  Moist Heat (CPT 59848)  Patient Position: Seated  Number Minutes Moist Heat: 10  Moist heat location: Low back  Post treatment skin assessment: Intact  Electric stimulation, unattended (CPT 13885) /  (Medicare)  Patient Position: Seated  E-stim location: Low back  E-stim type:  Interferential (IFC)  Post treatment skin

## 2023-07-10 ENCOUNTER — HOSPITAL ENCOUNTER (OUTPATIENT)
Dept: PHYSICAL THERAPY | Age: 63
Setting detail: THERAPIES SERIES
Discharge: HOME | End: 2023-07-10
Payer: COMMERCIAL

## 2023-07-10 PROCEDURE — 97110 THERAPEUTIC EXERCISES: CPT

## 2023-07-10 ASSESSMENT — PAIN DESCRIPTION - ORIENTATION: ORIENTATION: LEFT

## 2023-07-10 ASSESSMENT — PAIN DESCRIPTION - LOCATION: LOCATION: BACK;FOOT

## 2023-07-10 ASSESSMENT — PAIN SCALES - GENERAL: PAINLEVEL_OUTOF10: 6

## 2023-07-10 ASSESSMENT — PAIN DESCRIPTION - DESCRIPTORS: DESCRIPTORS: TINGLING;TIGHTNESS

## 2023-07-10 NOTE — PROGRESS NOTES
[facility]  Outpatient Physical Therapy    Treatment Note        Date: 7/10/2023  Patient: Aby Burgess  : 1960   Confirmed: Yes  MRN: [id number]  Referring Provider: Inna Batista MD    Medical Diagnosis: DDD (degenerative disc disease), lumbar [M51.36]       Treatment Diagnosis: LBP, reduced strength, reduced mobility, reduced flexibility, difficulty ambulating, reduced standing tolerance    Visit Information:  Insurance: Payor: Angie Singh [place] / Plan: Kandi Milan / Product Type: *No Product type* /   PT Visit Information  Onset Date:  (3-4 months)  PT Insurance Information: [employer]health  Total # of Visits Approved: 29 (+evaluation)  Total # of Visits to Date: 3  Plan of Care/Certification Expiration Date: 10/01/23  No Show: 0  Progress Note Due Date: 23  Canceled Appointment: 0  Progress Note Counter: 3/8-10- corrected count    Subjective Information:  Subjective: Patient reports difficulty ambulating/standing for mroe than 3min, increased LB and Calf cramping. HEP Compliance:  [x] Good [] Fair [] Poor [] Reports not doing due to:               Pain Screening  Patient Currently in Pain: Yes  Pain Level: 6  Pain Location: Back, Foot  Pain Orientation: Left  Pain Descriptors: Tingling, Tightness    Treatment:  Exercises:  Exercises  Exercise 1: SLR x15 reps, garcía  Exercise 2: hamstring str with strap 3 reps x 20\" holds, garcía  Exercise 3: calf str with towel 3 reps x 20\" holds, garcía  Exercise 4: hamstring sets on mat 10 reps x 5\" holds, garcía  Exercise 5: sciatic nerve glide L x 10 with towel  Exercise 7: LTR 5 sec x 10  Exercise 8: Bridges 3'' x10  Exercise 12: STS from chair x10  Exercise 13: DKTC 20 sec x 3 with towel  Exercise 15: Paloff press YTB x10, Lats/rows x10  Exercise 20: HEP: Bridges, nerve glides       *Indicates exercise, modality, or manual techniques to be initiated when appropriate           Assessment:    Body Structures, Functions, Activity

## 2023-07-12 ENCOUNTER — HOSPITAL ENCOUNTER (OUTPATIENT)
Dept: PHYSICAL THERAPY | Age: 63
Setting detail: THERAPIES SERIES
Discharge: HOME | End: 2023-07-12
Payer: COMMERCIAL

## 2023-07-12 PROCEDURE — 97110 THERAPEUTIC EXERCISES: CPT

## 2023-07-12 ASSESSMENT — PAIN SCALES - GENERAL: PAINLEVEL_OUTOF10: 3

## 2023-07-12 ASSESSMENT — PAIN DESCRIPTION - PAIN TYPE: TYPE: CHRONIC PAIN

## 2023-07-12 ASSESSMENT — PAIN DESCRIPTION - DESCRIPTORS: DESCRIPTORS: TINGLING;TIGHTNESS

## 2023-07-12 ASSESSMENT — PAIN DESCRIPTION - LOCATION: LOCATION: BACK;FOOT;LEG

## 2023-07-12 ASSESSMENT — PAIN DESCRIPTION - ORIENTATION: ORIENTATION: LEFT

## 2023-07-12 NOTE — PROGRESS NOTES
[facility]  Outpatient Physical Therapy   Treatment Note        Date: 2023  Patient: Nelson Skinner  : 1960   Confirmed: Yes  MRN: [id number]  Referring Provider: Librado Page MD      Medical Diagnosis: DDD (degenerative disc disease), lumbar [M51.36]      Treatment Diagnosis: LBP, reduced strength, reduced mobility, reduced flexibility, difficulty ambulating, reduced standing tolerance    Visit Information:  Insurance: Payor: Kain Osorio [place] / Plan: [place] / Product Type: *No Product type* /   PT Visit Information  Onset Date:  (3-4 months)  PT Insurance Information: [employer]  Total # of Visits Approved: 29 (+evaluation)  Total # of Visits to Date: 4  Plan of Care/Certification Expiration Date: 10/01/23  No Show: 0  Progress Note Due Date: 23  Canceled Appointment: 0  Progress Note Counter: 4/8-10    Subjective Information:  Subjective: Patient reports he is doing ok, reports increased pain, numbness in toes and foot.   HEP Compliance:  [x] Good [] Fair [] Poor [] Reports not doing due to:    Pain Screening  Patient Currently in Pain: Yes  Pain Level: 3  Pain Type: Chronic pain  Pain Location: Back, Foot, Leg  Pain Orientation: Left  Pain Descriptors: Tingling, Tightness    Treatment:  Exercises:  Exercises  Exercise 1: SLR - deferred due to quad lag  Exercise 4: hamstring sets on mat 15 reps x 5\" holds, garcía ; DKTC w/ pball 2 sets x 10 reps  Exercise 5: seated sciatic nerve glide x10 reps  Exercise 7: LTR 5 sec x 10  Exercise 8: Bridges 3'' x10 reps x 2 sets  Exercise 9: H/L march YTB 2 sets x 10reps, García ; H/L hip abd 2 sets x 10 reps xYTB ; H/L hip add w/ ball 2 sets x 10 reps x 5\" holds  Exercise 16: Scifit L1.5 x 4 min ; L1.0 x1 min  Exercise 17: SAQ 2 sets x 10 reps x 3-5\" holds  Exercise 18: quad sets 15 reps x 5\" holds  Exercise 20: HEP: continue current + SAQ & quad sets     *Indicates exercise, modality, or manual techniques to be initiated

## 2023-07-14 DIAGNOSIS — Z00.00 ROUTINE ADULT HEALTH MAINTENANCE: ICD-10-CM

## 2023-07-14 DIAGNOSIS — Z11.4 SCREENING FOR HIV WITHOUT PRESENCE OF RISK FACTORS: ICD-10-CM

## 2023-07-14 DIAGNOSIS — Z11.59 NEED FOR HEPATITIS C SCREENING TEST: ICD-10-CM

## 2023-07-14 LAB — HBA1C MFR BLD: 6.1 % (ref 4.8–5.9)

## 2023-07-15 LAB
HEPATITIS C ANTIBODY: NONREACTIVE
HIV AG/AB: NONREACTIVE

## 2023-07-17 ENCOUNTER — HOSPITAL ENCOUNTER (OUTPATIENT)
Dept: PHYSICAL THERAPY | Age: 63
Setting detail: THERAPIES SERIES
Discharge: HOME | End: 2023-07-17
Payer: COMMERCIAL

## 2023-07-17 PROCEDURE — 97110 THERAPEUTIC EXERCISES: CPT

## 2023-07-17 ASSESSMENT — PAIN SCALES - GENERAL: PAINLEVEL_OUTOF10: 6

## 2023-07-17 ASSESSMENT — PAIN DESCRIPTION - LOCATION: LOCATION: BACK;LEG;FOOT

## 2023-07-17 ASSESSMENT — PAIN DESCRIPTION - DESCRIPTORS: DESCRIPTORS: ACHING;TIGHTNESS;TINGLING

## 2023-07-17 ASSESSMENT — PAIN DESCRIPTION - PAIN TYPE: TYPE: CHRONIC PAIN

## 2023-07-17 ASSESSMENT — PAIN DESCRIPTION - ORIENTATION: ORIENTATION: LEFT

## 2023-07-17 NOTE — PROGRESS NOTES
[facility]  Outpatient Physical Therapy   Treatment Note        Date: 2023  Patient: Stacy Perez  : 1960   Confirmed: Yes  MRN: [id number]  Referring Provider: Osmani Wilson MD      Medical Diagnosis: DDD (degenerative disc disease), lumbar [M51.36]      Treatment Diagnosis: LBP, reduced strength, reduced mobility, reduced flexibility, difficulty ambulating, reduced standing tolerance    Visit Information:  Insurance: Payor: Patel Zimmerman / Plan: Patel Zimmerman / Product Type: *No Product type* /   PT Visit Information  Onset Date:  (3-4 months)  PT Insurance Information: [employer]health  Total # of Visits Approved: 29 (+evaluation)  Total # of Visits to Date: 5  Plan of Care/Certification Expiration Date: 10/01/23  No Show: 0  Progress Note Due Date: 23  Canceled Appointment: 0  Progress Note Counter: 5/8-10    Subjective Information:  Subjective: reports he is doing ok today, states last night was a bad night. has been trying to do the exercises everyday  HEP Compliance:  [x] Good [] Fair [] Poor [] Reports not doing due to:    Pain Screening  Patient Currently in Pain: Yes  Pain Level: 6  Pain Type: Chronic pain  Pain Location: Back, Leg, Foot  Pain Orientation: Left  Pain Descriptors: Aching, Tightness, Tingling    Treatment:  Exercises:  Exercises  Exercise 4: hamstring sets on pball15 reps x 5\" holds, garcía ; DKTC w/ pball x20 reps  Exercise 9: H/L march YTB 2 sets x 10reps, García ; H/L hip abd 2 sets x 10 reps xYTB ; H/L hip add w/ ball 2 sets x 10 reps x 5\" holds  Exercise 10: sink exercises: hip abd, hip ext, heel raises, hip march, mini squats x10 reps, each  Exercise 16: Scifit L1.5 x 5 min  Exercise 17: SAQ 2 sets x 15 reps x 3-5\" holds  Exercise 18: quad sets 15 reps x 5\" holds  Exercise 20: HEP: continue current     *Indicates exercise, modality, or manual techniques to be initiated when appropriate    Objective Measures:     STG 2 Current Status[de-identified]

## 2023-07-19 ENCOUNTER — HOSPITAL ENCOUNTER (OUTPATIENT)
Dept: PHYSICAL THERAPY | Age: 63
Setting detail: THERAPIES SERIES
Discharge: HOME | End: 2023-07-19
Payer: COMMERCIAL

## 2023-07-19 PROCEDURE — 97110 THERAPEUTIC EXERCISES: CPT

## 2023-07-19 PROCEDURE — G0283 ELEC STIM OTHER THAN WOUND: HCPCS

## 2023-07-19 ASSESSMENT — PAIN DESCRIPTION - LOCATION: LOCATION: BACK;LEG;FOOT

## 2023-07-19 ASSESSMENT — PAIN DESCRIPTION - ORIENTATION: ORIENTATION: LEFT

## 2023-07-19 ASSESSMENT — PAIN SCALES - GENERAL: PAINLEVEL_OUTOF10: 7

## 2023-07-19 ASSESSMENT — PAIN DESCRIPTION - PAIN TYPE: TYPE: CHRONIC PAIN

## 2023-07-19 NOTE — PROGRESS NOTES
to </=12 seconds to demonstrate improved functional strength and ease of transitional movements In progress     Long Term Goals Completed by 4-6 weeks Goal Status   LTG 1 patient will improve garcía LE strength to >/=4+/5 for improvements in ambulation tolerance In progress   LTG 2 patient will ambulate community distances with LRD and improved LLE WBing, step-length and reduced pain for increased functional mobility in the home and community In progress   LTG 3 paitent will improve bilateral SLR by >/=10 deg to demonstrate improvements in hamstring flexibility and ADLs In progress   LTG 4 patient will improve BRODY to </=10/50 for improvements in subjective function and QOL In progress          Plan:  Frequency/Duration:  Plan  Plan Frequency: 2x  Plan weeks: 4-5  Current Treatment Recommendations: Strengthening, ROM, Balance training, Gait training, Neuromuscular re-education, Manual, Pain management, Home exercise program, Safety education & training, Modalities, Dry needling  Modalities: Heat/Cold, Mechanical Traction, E-stim - unattended  Additional Comments: cupping  Pt to continue current HEP. See objective section for any therapeutic exercise changes, additions or modifications this date.     Therapy Time:      PT Individual Minutes  Time In: 1004  Time Out: 2927  Minutes: 52  Timed Code Treatment Minutes: 42 Minutes  Procedure Minutes: x10 min IFC to low back  Timed Activity Minutes Units   Ther Ex 42 3   Electronically signed by Negrita Calzada PT on 7/19/23 at 12:15 PM EDT

## 2023-07-24 ENCOUNTER — HOSPITAL ENCOUNTER (OUTPATIENT)
Dept: PHYSICAL THERAPY | Age: 63
Setting detail: THERAPIES SERIES
Discharge: HOME | End: 2023-07-24
Payer: COMMERCIAL

## 2023-07-24 PROCEDURE — 97110 THERAPEUTIC EXERCISES: CPT

## 2023-07-24 ASSESSMENT — PAIN DESCRIPTION - DESCRIPTORS: DESCRIPTORS: ACHING;CRAMPING

## 2023-07-24 ASSESSMENT — PAIN DESCRIPTION - ORIENTATION: ORIENTATION: LEFT

## 2023-07-24 ASSESSMENT — PAIN SCALES - GENERAL: PAINLEVEL_OUTOF10: 7

## 2023-07-24 ASSESSMENT — PAIN DESCRIPTION - LOCATION: LOCATION: BACK

## 2023-07-24 ASSESSMENT — PAIN DESCRIPTION - PAIN TYPE: TYPE: CHRONIC PAIN

## 2023-07-24 NOTE — PROGRESS NOTES
[facility]  Outpatient Physical Therapy    Treatment Note        Date: 2023  Patient: Nelson Skinner  : 1960   Confirmed: Yes  MRN: [id number]  Referring Provider: iLbrado Page MD    Medical Diagnosis: DDD (degenerative disc disease), lumbar [M51.36]       Treatment Diagnosis: LBP, reduced strength, reduced mobility, reduced flexibility, difficulty ambulating, reduced standing tolerance    Visit Information:  Insurance: Payor: Kain Osorio [place] / Plan: [place] / Product Type: *No Product type* /   PT Visit Information  Onset Date:  (3-4 months)  PT Insurance Information: [employer]  Total # of Visits Approved: 29 (+evaluation)  Total # of Visits to Date: 7  Plan of Care/Certification Expiration Date: 10/01/23  No Show: 0  Progress Note Due Date: 23  Canceled Appointment: 0  Progress Note Counter: 7/8-10    Subjective Information:  Subjective: reports increased pain today, [place] reports he has not call the podiatrist yet. Reports toes toes are numb and sensitive. HEP Compliance:  [] Good [x] Fair [] Poor [] Reports not doing due to:    Pain Screening  Patient Currently in Pain: Yes  Pain Assessment: 0-10  Pain Level: 7  Pain Type: Chronic pain  Pain Location: Back  Pain Orientation: Left  Pain Descriptors: Aching, Cramping    Treatment:  Exercises:  Exercises  Exercise 5: supine 90-90 nerve glide x15 reps ; PF/DF nerve glide x15 reps, LLE only  Exercise 16: recumbent bike L1.0 x 90 sec  Exercise 20: HEP: continue current     *Indicates exercise, modality, or manual techniques to be initiated when appropriate    Objective Measures:     LTG 2 Current Status[de-identified] : patient will ambulate intermittently with and without SPC, demonstrating reduced LLE WBing. Reports not change in walking tolerance     Assessment:    Body Structures, Functions, Activity Limitations Requiring Skilled

## 2023-07-26 ENCOUNTER — OFFICE VISIT (OUTPATIENT)
Dept: PRIMARY CARE CLINIC | Age: 63
End: 2023-07-26
Payer: MEDICAID

## 2023-07-26 ENCOUNTER — TRANSCRIBE ORDERS (OUTPATIENT)
Dept: ADMINISTRATIVE | Age: 63
End: 2023-07-26

## 2023-07-26 ENCOUNTER — HOSPITAL ENCOUNTER (OUTPATIENT)
Dept: PHYSICAL THERAPY | Age: 63
Setting detail: THERAPIES SERIES
Discharge: HOME | End: 2023-07-26
Payer: COMMERCIAL

## 2023-07-26 VITALS
WEIGHT: 161 LBS | BODY MASS INDEX: 23.05 KG/M2 | TEMPERATURE: 96.8 F | HEART RATE: 90 BPM | DIASTOLIC BLOOD PRESSURE: 98 MMHG | OXYGEN SATURATION: 98 % | SYSTOLIC BLOOD PRESSURE: 138 MMHG | HEIGHT: 70 IN

## 2023-07-26 DIAGNOSIS — I73.9 CLAUDICATION (HCC): ICD-10-CM

## 2023-07-26 DIAGNOSIS — M79.2 INTRACTABLE NEUROPATHIC PAIN OF LUMBOSACRAL ORIGIN: Primary | ICD-10-CM

## 2023-07-26 DIAGNOSIS — I73.9 CLAUDICATION (HCC): Primary | ICD-10-CM

## 2023-07-26 DIAGNOSIS — I83.813 VARICOSE VEINS OF BOTH LOWER EXTREMITIES WITH PAIN: ICD-10-CM

## 2023-07-26 DIAGNOSIS — I83.813 VARICOSE VEINS OF BOTH LOWER EXTREMITIES WITH PAIN: Primary | ICD-10-CM

## 2023-07-26 PROCEDURE — 99214 OFFICE O/P EST MOD 30 MIN: CPT | Performed by: STUDENT IN AN ORGANIZED HEALTH CARE EDUCATION/TRAINING PROGRAM

## 2023-07-26 PROCEDURE — 97110 THERAPEUTIC EXERCISES: CPT

## 2023-07-26 ASSESSMENT — PAIN DESCRIPTION - LOCATION: LOCATION: BACK

## 2023-07-26 ASSESSMENT — PAIN DESCRIPTION - DESCRIPTORS: DESCRIPTORS: ACHING;CRAMPING

## 2023-07-26 ASSESSMENT — PAIN SCALES - GENERAL: PAINLEVEL_OUTOF10: 6

## 2023-07-26 NOTE — PROGRESS NOTES
7/26/2023        Maxx  1960 is a 66 y.o. male who presents today with:  Chief Complaint   Patient presents with    Joint Pain     Left side, Toes are numb, tender, patient states that heel is numb, patient states that walking is very difficult and sitting is challenging  PT is not working & today is the last day        HPI:   Haleigh Ruby presents today to follow-up on leg pain. He states that his toes get numb and he finds difficult to walk as he starts to have pain in both legs. He has been doing physical therapy with mild improvement from a strength perspective however his pain has not changed. He denies anything further today. Assessment & Plan   1. Intractable neuropathic pain of lumbosacral origin  -     MRI LUMBAR SPINE WO CONTRAST; Future  2. Claudication [facility])  -     Vascular duplex lower extremity arteries bilateral with ROMINA; Future  3. Varicose veins of both lower extremities with pain  -     US DOPPLER VENOUS LEGS B/L; Future     There are no discontinued medications. Return if symptoms worsen or fail to improve. MRI of the lumbar spine, then may need specialist or gabapentin  Dopplers of arteries and veins bilateral lower extremities for possible PVD    Objective  No Known Allergies  Current Outpatient Medications   Medication Sig Dispense Refill    omeprazole (PRILOSEC) 40 MG delayed release capsule Take 1 capsule by mouth every morning (before breakfast) 30 capsule 1    sertraline (ZOLOFT) 25 MG tablet Take 1 tablet by mouth daily 90 tablet 1    losartan (COZAAR) 25 MG tablet Take 1 tablet by mouth daily 30 tablet 5    Multiple Vitamin (MULTIVITAMIN) TABS tablet Take 1 tablet by mouth daily 30 tablet 0     No current facility-administered medications for this visit. PMH, Surgical Hx, Family Hx, and Social Hx reviewed and updated. Health Maintenance reviewed.     Vitals:    07/26/23 1143 07/26/23 1148   BP: (!) 144/80 (!) 138/98   Site: Right Upper Arm Right Upper Arm

## 2023-07-26 NOTE — PROGRESS NOTES
[facility]  Outpatient Physical Therapy   Treatment Note        Date: 2023  Patient: Clarence Christian  : 1960   Confirmed: Yes  MRN: [id number]  Referring Provider: Summer Trujillo MD      Medical Diagnosis: DDD (degenerative disc disease), lumbar [M51.36]      Treatment Diagnosis: LBP, reduced strength, reduced mobility, reduced flexibility, difficulty ambulating, reduced standing tolerance    Visit Information:  Insurance: Payor: Arnoldo Mccullough [place] / Plan: Gio Silva / Product Type: *No Product type* /   PT Visit Information  Onset Date:  (3-4 months)  PT Insurance Information: [employer]  Total # of Visits Approved: 29 (+evaluation)  Total # of Visits to Date: 8  Plan of Care/Certification Expiration Date: 10/01/23  No Show: 0  Progress Note Due Date: 23  Canceled Appointment: 0  Progress Note Counter: -10    Subjective Information:  Subjective: Pt reports with 6/10 pain LB achy. left foot continues with N/T both of which increeased with standing and amb can only tolerate 80 -100 ' before increased discomfort and shakiness increase. HEP Compliance:  [x] Good [] Fair [] Poor [] Reports not doing due to:               Pain Screening  Patient Currently in Pain: Yes  Pain Level: 6  Pain Location: Back  Pain Descriptors: Aching, Cramping    Treatment:  Exercises:  Exercises  Exercise 1: Objective measurements.        *Indicates exercise, modality, or manual techniques to be initiated when appropriate    Objective Measures:        Strength RLE  R Hip Flexion: 4+/5  R Hip Extension: 4+/5  R Hip ABduction: 4+/5  R Hip ADduction: 4+/5  R Knee Flexion: 5/5  R Knee Extension: 5/5  R Ankle Dorsiflexion: 5/5  Strength LLE  L Hip Flexion: 4+/5  L Hip Extension: 4/5  L Hip ABduction: 4+/5  L Hip ADduction: 4/5  L Knee Flexion: 5/5  L Knee Extension: 4+/5  L Ankle Dorsiflexion: 4+/5                AROM LLE (degrees)  L SLR: 55 deg   AROM RLE (degrees)  R SLR: 60 deg

## 2023-07-27 ENCOUNTER — TELEPHONE (OUTPATIENT)
Dept: PRIMARY CARE CLINIC | Age: 63
End: 2023-07-27

## 2023-07-27 NOTE — TELEPHONE ENCOUNTER
Central scheduling called wanting some clarification on the orders for patient. They believe that there are some duplicates in the system. Can you please verify which orders that you want patient to schedule and if it is all I will let central scheduling know.

## 2023-07-28 ENCOUNTER — TRANSCRIBE ORDERS (OUTPATIENT)
Dept: ADMINISTRATIVE | Age: 63
End: 2023-07-28

## 2023-07-28 DIAGNOSIS — I73.9 CLAUDICATION (HCC): Primary | ICD-10-CM

## 2023-07-28 NOTE — TELEPHONE ENCOUNTER
Called central scheduling they told me to contact radiology, Coco Perry was able to get patient scheduled for the same day.

## 2023-08-04 ENCOUNTER — HOSPITAL ENCOUNTER (OUTPATIENT)
Dept: MRI IMAGING | Age: 63
End: 2023-08-04
Payer: COMMERCIAL

## 2023-08-04 ENCOUNTER — HOSPITAL ENCOUNTER (OUTPATIENT)
Dept: ULTRASOUND IMAGING | Age: 63
End: 2023-08-04
Payer: COMMERCIAL

## 2023-08-04 ENCOUNTER — TELEPHONE (OUTPATIENT)
Dept: PRIMARY CARE CLINIC | Age: 63
End: 2023-08-04

## 2023-08-04 DIAGNOSIS — M79.2 INTRACTABLE NEUROPATHIC PAIN OF LUMBOSACRAL ORIGIN: ICD-10-CM

## 2023-08-04 DIAGNOSIS — I83.813 VARICOSE VEINS OF BOTH LOWER EXTREMITIES WITH PAIN: ICD-10-CM

## 2023-08-04 PROCEDURE — 93970 EXTREMITY STUDY: CPT

## 2023-08-04 PROCEDURE — 72148 MRI LUMBAR SPINE W/O DYE: CPT

## 2023-08-07 ENCOUNTER — HOSPITAL ENCOUNTER (EMERGENCY)
Age: 63
Discharge: HOME | End: 2023-08-07
Attending: EMERGENCY MEDICINE
Payer: COMMERCIAL

## 2023-08-07 VITALS
OXYGEN SATURATION: 97 % | WEIGHT: 162 LBS | TEMPERATURE: 97.3 F | SYSTOLIC BLOOD PRESSURE: 141 MMHG | BODY MASS INDEX: 23.19 KG/M2 | DIASTOLIC BLOOD PRESSURE: 99 MMHG | HEART RATE: 58 BPM | HEIGHT: 70 IN | RESPIRATION RATE: 16 BRPM

## 2023-08-07 DIAGNOSIS — I73.9 LEFT LEG CLAUDICATION (HCC): Primary | ICD-10-CM

## 2023-08-07 LAB
ALBUMIN SERPL-MCNC: 4.3 G/DL (ref 3.5–4.6)
ALP SERPL-CCNC: 128 U/L (ref 35–104)
ALT SERPL-CCNC: 9 U/L (ref 0–41)
ANION GAP SERPL CALCULATED.3IONS-SCNC: 10 MEQ/L (ref 9–15)
AST SERPL-CCNC: 14 U/L (ref 0–40)
BASOPHILS # BLD: 0 K/UL (ref 0–0.2)
BASOPHILS NFR BLD: 0.4 %
BILIRUB SERPL-MCNC: 0.3 MG/DL (ref 0.2–0.7)
BUN SERPL-MCNC: 10 MG/DL (ref 8–23)
CALCIUM SERPL-MCNC: 9.6 MG/DL (ref 8.5–9.9)
CHLORIDE SERPL-SCNC: 102 MEQ/L (ref 95–107)
CO2 SERPL-SCNC: 26 MEQ/L (ref 20–31)
CREAT SERPL-MCNC: 0.76 MG/DL (ref 0.7–1.2)
EOSINOPHIL # BLD: 0.2 K/UL (ref 0–0.7)
EOSINOPHIL NFR BLD: 1.9 %
ERYTHROCYTE [DISTWIDTH] IN BLOOD BY AUTOMATED COUNT: 14.8 % (ref 11.5–14.5)
GLOBULIN SER CALC-MCNC: 2.8 G/DL (ref 2.3–3.5)
GLUCOSE SERPL-MCNC: 104 MG/DL (ref 70–99)
HCT VFR BLD AUTO: 47.9 % (ref 42–52)
HGB BLD-MCNC: 16.2 G/DL (ref 14–18)
INR PPP: 1
LYMPHOCYTES # BLD: 1.8 K/UL (ref 1–4.8)
LYMPHOCYTES NFR BLD: 18.2 %
MCH RBC QN AUTO: 32.6 PG (ref 27–31.3)
MCHC RBC AUTO-ENTMCNC: 33.9 % (ref 33–37)
MCV RBC AUTO: 96.1 FL (ref 79–92.2)
MONOCYTES # BLD: 0.9 K/UL (ref 0.2–0.8)
MONOCYTES NFR BLD: 8.7 %
NEUTROPHILS # BLD: 7 K/UL (ref 1.4–6.5)
NEUTS SEG NFR BLD: 70.8 %
PLATELET # BLD AUTO: 214 K/UL (ref 130–400)
POTASSIUM SERPL-SCNC: 4.5 MEQ/L (ref 3.4–4.9)
PROT SERPL-MCNC: 7.1 G/DL (ref 6.3–8)
PROTHROMBIN TIME: 13.2 SEC (ref 12.3–14.9)
RBC # BLD AUTO: 4.98 M/UL (ref 4.7–6.1)
SODIUM SERPL-SCNC: 138 MEQ/L (ref 135–144)
WBC # BLD AUTO: 9.9 K/UL (ref 4.8–10.8)

## 2023-08-07 PROCEDURE — 36415 COLL VENOUS BLD VENIPUNCTURE: CPT

## 2023-08-07 PROCEDURE — 80053 COMPREHEN METABOLIC PANEL: CPT

## 2023-08-07 PROCEDURE — 85610 PROTHROMBIN TIME: CPT

## 2023-08-07 PROCEDURE — 85025 COMPLETE CBC W/AUTO DIFF WBC: CPT

## 2023-08-07 PROCEDURE — 6370000000 HC RX 637 (ALT 250 FOR IP): Performed by: EMERGENCY MEDICINE

## 2023-08-07 PROCEDURE — 99283 EMERGENCY DEPT VISIT LOW MDM: CPT

## 2023-08-07 RX ORDER — ATORVASTATIN CALCIUM 20 MG/1
20 TABLET, FILM COATED ORAL DAILY
Qty: 30 TABLET | Refills: 2 | Status: SHIPPED | OUTPATIENT
Start: 2023-08-07

## 2023-08-07 RX ORDER — ASPIRIN 81 MG/1
81 TABLET, CHEWABLE ORAL ONCE
Status: COMPLETED | OUTPATIENT
Start: 2023-08-07 | End: 2023-08-07

## 2023-08-07 RX ADMIN — ASPIRIN 81 MG 81 MG: 81 TABLET ORAL at 13:24

## 2023-08-07 ASSESSMENT — ENCOUNTER SYMPTOMS
ABDOMINAL PAIN: 0
COUGH: 0
SHORTNESS OF BREATH: 0
PHOTOPHOBIA: 0
WHEEZING: 0
ABDOMINAL DISTENTION: 0
EYE DISCHARGE: 0
CHEST TIGHTNESS: 0
SORE THROAT: 0
VOMITING: 0

## 2023-08-07 ASSESSMENT — LIFESTYLE VARIABLES
HOW OFTEN DO YOU HAVE A DRINK CONTAINING ALCOHOL: NEVER
HOW MANY STANDARD DRINKS CONTAINING ALCOHOL DO YOU HAVE ON A TYPICAL DAY: PATIENT DOES NOT DRINK

## 2023-08-07 ASSESSMENT — PAIN - FUNCTIONAL ASSESSMENT: PAIN_FUNCTIONAL_ASSESSMENT: NONE - DENIES PAIN

## 2023-08-07 NOTE — ED PROVIDER NOTES
[facility] ED  eMERGENCY dEPARTMENT eNCOUnter      Pt Name: Stefani Prakash  MRN: [id number]  [address] [place] 1960  Date of evaluation: 8/7/2023  Provider: Leonard Brewer MD    CHIEF COMPLAINT       Chief Complaint   Patient presents with    Other     Pt was told by doctor to come to ed for possible blood clot in left leg. HISTORY OF PRESENT ILLNESS   (Location/Symptom, Timing/Onset,Context/Setting, Quality, Duration, Modifying Factors, Severity)  Note limiting factors. Stefani Prakash is a 66 y.o. male who presents to the emergency department for possible blood clot in his leg. Patient has been having intermittent pain almost claudication-like symptoms of his left leg with activity. He first noticed it 3 weeks ago and now every time he walks he gets various symptoms of numbness and sometimes tingling to his foot and calf area. It has caused him to change a little bit the way he walks because of the discomfort. Does not have pain at rest and does not have swelling. No shortness of breath or chest pain. Work-up included MRI of his spine which was okay and then ultrasound of the left leg 4 days ago. Results in today they sent him in here for evaluation. The results from his ultrasound show normal venous study but abnormal arterial studies. He has occlusion of the left superficial femoral artery. However, there is left common femoral artery patent and there is reconstitution of the left popliteal artery. He has no complete vascular occlusion. HPI    NursingNotes were reviewed. REVIEW OF SYSTEMS    (2-9 systems for level 4, 10 or more for level 5)     Review of Systems   Constitutional:  Negative for chills and diaphoresis. HENT:  Negative for congestion, ear pain, mouth sores and sore throat. Eyes:  Negative for photophobia and discharge. Respiratory:  Negative for cough, chest tightness, shortness of breath and wheezing.     Cardiovascular:  Negative for chest pain and

## 2023-08-14 ENCOUNTER — OFFICE VISIT (OUTPATIENT)
Dept: PRIMARY CARE CLINIC | Age: 63
End: 2023-08-14
Payer: COMMERCIAL

## 2023-08-14 VITALS
BODY MASS INDEX: 23.19 KG/M2 | HEIGHT: 70 IN | DIASTOLIC BLOOD PRESSURE: 86 MMHG | SYSTOLIC BLOOD PRESSURE: 122 MMHG | WEIGHT: 162 LBS | OXYGEN SATURATION: 96 % | TEMPERATURE: 97.5 F | HEART RATE: 82 BPM

## 2023-08-14 DIAGNOSIS — M48.061 SPINAL STENOSIS AT L4-L5 LEVEL: ICD-10-CM

## 2023-08-14 DIAGNOSIS — I70.209 SUPERFICIAL FEMORAL ARTERY OCCLUSION (HCC): Primary | ICD-10-CM

## 2023-08-14 DIAGNOSIS — K21.9 GASTROESOPHAGEAL REFLUX DISEASE, UNSPECIFIED WHETHER ESOPHAGITIS PRESENT: ICD-10-CM

## 2023-08-14 PROCEDURE — 99214 OFFICE O/P EST MOD 30 MIN: CPT | Performed by: STUDENT IN AN ORGANIZED HEALTH CARE EDUCATION/TRAINING PROGRAM

## 2023-08-14 RX ORDER — OMEPRAZOLE 40 MG/1
40 CAPSULE, DELAYED RELEASE ORAL
Qty: 30 CAPSULE | Refills: 1 | Status: SHIPPED | OUTPATIENT
Start: 2023-08-14 | End: 2023-08-14

## 2023-08-14 RX ORDER — GABAPENTIN 300 MG/1
300 CAPSULE ORAL 3 TIMES DAILY
Qty: 90 CAPSULE | Refills: 2 | Status: SHIPPED | OUTPATIENT
Start: 2023-08-14 | End: 2023-11-12

## 2023-08-14 RX ORDER — OMEPRAZOLE 40 MG/1
40 CAPSULE, DELAYED RELEASE ORAL
Qty: 30 CAPSULE | Refills: 2 | Status: SHIPPED | OUTPATIENT
Start: 2023-08-14

## 2023-08-14 NOTE — PROGRESS NOTES
8/14/2023        Brad Johnson 1960 is a 66 y.o. male who presents today with:  Chief Complaint   Patient presents with    Follow-up     Patient presents today for an ED follow up        HPI:   Monae Pascal presents today for ED follow-up. He was seen on the seventh due to superficial femoral artery occlusion. The case was discussed with vascular surgery Dr. Vinicius Montero. They are recommending daily baby aspirin with beginning a statin also. Follow-up with Dr. Vinicius Montero in the office is scheduled for this week. Patient states he is still having left lower extremity pain when walking which feels like numbness and tingling. He does have a recent lumbar spine MRI which showed spinal stenosis. He is requesting treatment today. He denies any chest pain, shortness of breath, palpitations. Assessment & Plan   1. Superficial femoral artery occlusion (HCC)  2. Gastroesophageal reflux disease, unspecified whether esophagitis present  -     omeprazole (PRILOSEC) 40 MG delayed release capsule; Take 1 capsule by mouth every morning (before breakfast), Disp-30 capsule, R-2Normal  3. Spinal stenosis at L4-L5 level  -     gabapentin (NEURONTIN) 300 MG capsule; Take 1 capsule by mouth 3 times daily for 90 days. Intended supply: 90 days, Disp-90 capsule, R-2Normal     Medications Discontinued During This Encounter   Medication Reason    omeprazole (PRILOSEC) 40 MG delayed release capsule REORDER    omeprazole (PRILOSEC) 40 MG delayed release capsule      Return in about 3 months (around 11/14/2023) for gabapentin. Follow-up with cardiology this week. Start gabapentin today for spinal stenosis    Objective  No Known Allergies  Current Outpatient Medications   Medication Sig Dispense Refill    gabapentin (NEURONTIN) 300 MG capsule Take 1 capsule by mouth 3 times daily for 90 days.  Intended supply: 90 days 90 capsule 2    omeprazole (PRILOSEC) 40 MG delayed release capsule Take 1 capsule by mouth every morning (before breakfast)

## 2023-08-14 NOTE — PATIENT INSTRUCTIONS
Gabapentin 300 mg up to 3 times daily    Medications to take at night: Zoloft and gabapentin    All other medications to be taken in the morning together    Follow-up with cardiology

## 2023-08-22 ENCOUNTER — TELEPHONE (OUTPATIENT)
Dept: PRIMARY CARE CLINIC | Age: 63
End: 2023-08-22

## 2023-08-22 NOTE — TELEPHONE ENCOUNTER
Called patient and got the phone number to Dr. Chago Lucas. Called Dr. Chago Lucas office left message for them to contact office back.

## 2023-08-22 NOTE — TELEPHONE ENCOUNTER
----- Message from Park De La Paz sent at 8/21/2023  9:15 AM EDT -----  Subject: Message to Provider    QUESTIONS  Information for Provider? Patient is following up on the office looking   into helping him get in sooner to see the Cardiologist Dr. Sunshine Devine for the   Blood clot in his leg after having a MRI. Dr. Maria A Ambriz is aware of this. Patient has been off work for 5 months and has been waiting on the office   to follow up with him as soon as possible. Please advise.   ---------------------------------------------------------------------------  --------------  Bossman RAMIREZ  [phone]; OK to leave message on voicemail  ---------------------------------------------------------------------------  --------------  SCRIPT ANSWERS  Relationship to Patient?  Self

## 2023-08-30 ENCOUNTER — HOSPITAL ENCOUNTER (OUTPATIENT)
Dept: ULTRASOUND IMAGING | Age: 63
Discharge: HOME | End: 2023-09-01

## 2023-08-30 DIAGNOSIS — I73.9 CLAUDICATION (HCC): ICD-10-CM

## 2023-09-20 LAB
ANION GAP SERPL CALCULATED.3IONS-SCNC: 11 MEQ/L (ref 9–15)
BUN SERPL-MCNC: 7 MG/DL (ref 8–23)
CALCIUM SERPL-MCNC: 9.2 MG/DL (ref 8.5–9.9)
CHLORIDE SERPL-SCNC: 102 MEQ/L (ref 95–107)
CO2 SERPL-SCNC: 25 MEQ/L (ref 20–31)
CREAT SERPL-MCNC: 0.76 MG/DL (ref 0.7–1.2)
GLUCOSE SERPL-MCNC: 82 MG/DL (ref 70–99)
POTASSIUM SERPL-SCNC: 4.1 MEQ/L (ref 3.4–4.9)
SODIUM SERPL-SCNC: 138 MEQ/L (ref 135–144)

## 2023-09-28 ENCOUNTER — TRANSCRIBE ORDERS (OUTPATIENT)
Dept: ADMINISTRATIVE | Age: 63
End: 2023-09-28

## 2023-09-28 DIAGNOSIS — I74.3 EMBOLISM AND THROMBOSIS OF ARTERIES OF LOWER EXTREMITY (HCC): Primary | ICD-10-CM

## 2023-09-28 DIAGNOSIS — I70.213 ATHEROSCLEROSIS OF NATIVE ARTERY OF BOTH LOWER EXTREMITIES WITH INTERMITTENT CLAUDICATION (HCC): ICD-10-CM

## 2023-09-29 NOTE — PROGRESS NOTES
[id number] [facility]     Ph: [phone]  Fax: [phone]    [] Certification  [] Recertification []  Plan of Care  [x] Progress Note [] Discharge      Referring Provider: Long Rodriges MD    From:  Marcin Osuna, PT, DPT  Patient: Pilar Leonardo (61 y.o. male) : 1960 Date: 2023   Medical Diagnosis: DDD (degenerative disc disease), lumbar [M51.36]    Treatment Diagnosis: LBP, reduced strength, reduced mobility, reduced flexibility, difficulty ambulating, reduced standing tolerance    Plan of Care/Certification Expiration Date: 10/01/23   Progress Report Period from: 2023  to 2023  Visits to Date: 8 No Show: 0 Cancelled Appts: 0    OBJECTIVE:   Short Term Goals - Time Frame for Short Term Goals: 2-3 weeks    Goals Current/Discharge status  Status   Short Term Goal 1: patient will be indep with HEP to self-manage symptoms upon d/c  STG 1 Current Status[de-identified] 23 states compliant with HEP. met   Short Term Goal 2: patient will improve 5xSTS to </=12 seconds to demonstrate improved functional strength and ease of transitional movements  STG 2 Current Status[de-identified] 23 23.58   In progress, not met     Long Term Goals - Time Frame for Long Term Goals : 4-6 weeks  Goals Current/ Discharge status Status   Long Term Goal 1: patient will improve garcía LE strength to >/=4+/5 for improvements in ambulation tolerance LTG 1 Current Status[de-identified] 23 Left hip,knee flex and DF 4+/5 except abd & ext 4/5, Knee ext 5/5.  Right hip 4+/5, knee and DF 5/5   In progress, partially met   Long Term Goal 2: patient will ambulate community distances with LRD and improved LLE WBing, step-length and reduced pain for increased functional mobility in the home and community LTG 2 Current Status[de-identified] 23 Pt amb without AD decreased left lateral weight shift, decreased heel strike, decreased trunk rotation and UE normal gait and station , no tenderness or deformities present

## 2023-10-03 ENCOUNTER — HOSPITAL ENCOUNTER (OUTPATIENT)
Dept: CT IMAGING | Age: 63
Discharge: HOME | End: 2023-10-05
Attending: SURGERY
Payer: COMMERCIAL

## 2023-10-03 DIAGNOSIS — I70.213 ATHEROSCLEROSIS OF NATIVE ARTERY OF BOTH LOWER EXTREMITIES WITH INTERMITTENT CLAUDICATION (HCC): ICD-10-CM

## 2023-10-03 DIAGNOSIS — I74.3 EMBOLISM AND THROMBOSIS OF ARTERIES OF LOWER EXTREMITY (HCC): ICD-10-CM

## 2023-10-03 PROCEDURE — 6360000004 HC RX CONTRAST MEDICATION: Performed by: SURGERY

## 2023-10-03 PROCEDURE — 75635 CT ANGIO ABDOMINAL ARTERIES: CPT

## 2023-10-03 RX ADMIN — IOPAMIDOL 135 ML: 612 INJECTION, SOLUTION INTRAVENOUS at 13:23

## 2023-10-06 ENCOUNTER — OFFICE VISIT (OUTPATIENT)
Dept: CARDIOLOGY CLINIC | Age: 63
End: 2023-10-06

## 2023-10-06 VITALS
WEIGHT: 166.8 LBS | SYSTOLIC BLOOD PRESSURE: 122 MMHG | BODY MASS INDEX: 23.88 KG/M2 | OXYGEN SATURATION: 97 % | DIASTOLIC BLOOD PRESSURE: 84 MMHG | HEART RATE: 68 BPM | HEIGHT: 70 IN

## 2023-10-06 DIAGNOSIS — F19.94 SUBSTANCE INDUCED MOOD DISORDER (HCC): ICD-10-CM

## 2023-10-06 DIAGNOSIS — I10 HYPERTENSION, UNSPECIFIED TYPE: ICD-10-CM

## 2023-10-06 DIAGNOSIS — E78.5 DYSLIPIDEMIA: ICD-10-CM

## 2023-10-06 DIAGNOSIS — R06.09 DOE (DYSPNEA ON EXERTION): ICD-10-CM

## 2023-10-06 DIAGNOSIS — F17.200 SMOKER: ICD-10-CM

## 2023-10-06 DIAGNOSIS — I73.9 PAD (PERIPHERAL ARTERY DISEASE) (HCC): ICD-10-CM

## 2023-10-06 DIAGNOSIS — Z00.00 PE (PHYSICAL EXAM), ANNUAL: Primary | ICD-10-CM

## 2023-10-06 ASSESSMENT — ENCOUNTER SYMPTOMS
CHEST TIGHTNESS: 0
NAUSEA: 0
SHORTNESS OF BREATH: 1
COUGH: 0
BLOOD IN STOOL: 0
STRIDOR: 0
GASTROINTESTINAL NEGATIVE: 1
WHEEZING: 0
EYES NEGATIVE: 1

## 2023-10-06 NOTE — PROGRESS NOTES
NEW PATIENT        Patient: Lola Saxena  YOB: 1960  MRN: [id number]    Chief Complaint:  Chief Complaint   Patient presents with    Cardiac Clearance     100% blocked artery in left leg       CV Data:    Subjective/HPI referred by Dr. Clotilde Winn for preop Fem -POP. Pt is a smoker. He does have CERVANTES. No CP no falls no edema no bleed. EKG: SR 72    Unemployed  Lives alone  ++FH  Smoker  Recovering ETOH    Past Medical History:   Diagnosis Date    Anxiety        No past surgical history on file. Family History   Problem Relation Age of Onset    Heart Disease Father        Social History     Socioeconomic History    Marital status: Single     Spouse name: None    Number of children: None    Years of education: None    Highest education level: None   Tobacco Use    Smoking status: Every Day     Packs/day: 1.00     Years: 20.00     Additional pack years: 0.00     Total pack years: 20.00     Types: Cigarettes     Passive exposure: Never    Smokeless tobacco: Never   Vaping Use    Vaping Use: Never used   Substance and Sexual Activity    Alcohol use: Yes     Comment: daily rum    Drug use: No     Social Determinants of Health     Financial Resource Strain: Low Risk  (5/8/2023)    Overall Financial Resource Strain (CARDIA)     Difficulty of Paying Living Expenses: Not very hard   Food Insecurity: No Food Insecurity (5/8/2023)    Hunger Vital Sign     Worried About Running Out of Food in the Last Year: Never true     Ran Out of Food in the Last Year: Never true   Transportation Needs: Unknown (5/8/2023)    PRAPARE - Transportation     Lack of Transportation (Non-Medical): No   Housing Stability: Unknown (5/8/2023)    Housing Stability Vital Sign     Unstable Housing in the Last Year: No       No Known Allergies    Current Outpatient Medications   Medication Sig Dispense Refill    gabapentin (NEURONTIN) 300 MG capsule Take 1 capsule by mouth 3 times daily for 90 days.  Intended supply: 90 days 90 capsule 2

## 2023-10-23 ENCOUNTER — HOSPITAL ENCOUNTER (OUTPATIENT)
Age: 63
Discharge: HOME | End: 2023-10-25
Attending: INTERNAL MEDICINE
Payer: COMMERCIAL

## 2023-10-23 ENCOUNTER — HOSPITAL ENCOUNTER (OUTPATIENT)
Dept: NUCLEAR MEDICINE | Age: 63
Discharge: HOME | End: 2023-10-25
Attending: INTERNAL MEDICINE
Payer: COMMERCIAL

## 2023-10-23 VITALS
SYSTOLIC BLOOD PRESSURE: 122 MMHG | BODY MASS INDEX: 23.89 KG/M2 | WEIGHT: 166.89 LBS | HEIGHT: 70 IN | DIASTOLIC BLOOD PRESSURE: 84 MMHG

## 2023-10-23 DIAGNOSIS — R06.09 DOE (DYSPNEA ON EXERTION): ICD-10-CM

## 2023-10-23 LAB
ECHO BSA: 1.93 M2
ECHO BSA: 1.93 M2
ECHO EST RA PRESSURE: 10 MMHG
ECHO MV A VELOCITY: 0.36 M/S
ECHO MV E DECELERATION TIME (DT): 271.8 MS
ECHO MV E VELOCITY: 0.34 M/S
ECHO MV E/A RATIO: 0.94
ECHO RIGHT VENTRICULAR SYSTOLIC PRESSURE (RVSP): 14 MMHG
ECHO TV REGURGITANT MAX VELOCITY: 0.96 M/S
ECHO TV REGURGITANT PEAK GRADIENT: 4 MMHG
NUC STRESS EJECTION FRACTION: 67 %
STRESS BASELINE DIAS BP: 108 MMHG
STRESS BASELINE HR: 75 BPM
STRESS BASELINE ST DEPRESSION: 0 MM
STRESS BASELINE SYS BP: 159 MMHG
STRESS ESTIMATED WORKLOAD: 1 METS
STRESS PEAK DIAS BP: 100 MMHG
STRESS PEAK SYS BP: 179 MMHG
STRESS PERCENT HR ACHIEVED: 65 %
STRESS POST PEAK HR: 104 BPM
STRESS RATE PRESSURE PRODUCT: NORMAL BPM*MMHG
STRESS ST DEPRESSION: 0 MM
STRESS TARGET HR: 159 BPM
TID: 0.99

## 2023-10-23 PROCEDURE — 93018 CV STRESS TEST I&R ONLY: CPT | Performed by: INTERNAL MEDICINE

## 2023-10-23 PROCEDURE — A9502 TC99M TETROFOSMIN: HCPCS | Performed by: INTERNAL MEDICINE

## 2023-10-23 PROCEDURE — 6360000002 HC RX W HCPCS: Performed by: INTERNAL MEDICINE

## 2023-10-23 PROCEDURE — 93306 TTE W/DOPPLER COMPLETE: CPT

## 2023-10-23 PROCEDURE — 3430000000 HC RX DIAGNOSTIC RADIOPHARMACEUTICAL: Performed by: INTERNAL MEDICINE

## 2023-10-23 PROCEDURE — 93017 CV STRESS TEST TRACING ONLY: CPT

## 2023-10-23 PROCEDURE — 2580000003 HC RX 258: Performed by: INTERNAL MEDICINE

## 2023-10-23 PROCEDURE — 93306 TTE W/DOPPLER COMPLETE: CPT | Performed by: INTERNAL MEDICINE

## 2023-10-23 PROCEDURE — 78452 HT MUSCLE IMAGE SPECT MULT: CPT

## 2023-10-23 RX ORDER — SODIUM CHLORIDE 0.9 % (FLUSH) 0.9 %
10 SYRINGE (ML) INJECTION PRN
Status: COMPLETED | OUTPATIENT
Start: 2023-10-23 | End: 2023-10-23

## 2023-10-23 RX ORDER — REGADENOSON 0.08 MG/ML
0.4 INJECTION, SOLUTION INTRAVENOUS
Status: COMPLETED | OUTPATIENT
Start: 2023-10-23 | End: 2023-10-23

## 2023-10-23 RX ADMIN — TETROFOSMIN 11.8 MILLICURIE: 1.38 INJECTION, POWDER, LYOPHILIZED, FOR SOLUTION INTRAVENOUS at 09:15

## 2023-10-23 RX ADMIN — SODIUM CHLORIDE, PRESERVATIVE FREE 10 ML: 5 INJECTION INTRAVENOUS at 10:12

## 2023-10-23 RX ADMIN — SODIUM CHLORIDE, PRESERVATIVE FREE 10 ML: 5 INJECTION INTRAVENOUS at 10:13

## 2023-10-23 RX ADMIN — SODIUM CHLORIDE, PRESERVATIVE FREE 10 ML: 5 INJECTION INTRAVENOUS at 09:17

## 2023-10-23 RX ADMIN — TETROFOSMIN 35.9 MILLICURIE: 1.38 INJECTION, POWDER, LYOPHILIZED, FOR SOLUTION INTRAVENOUS at 10:12

## 2023-10-23 RX ADMIN — REGADENOSON 0.4 MG: 0.08 INJECTION, SOLUTION INTRAVENOUS at 10:12

## 2023-10-28 DIAGNOSIS — F33.1 MODERATE EPISODE OF RECURRENT MAJOR DEPRESSIVE DISORDER (HCC): ICD-10-CM

## 2023-10-30 RX ORDER — SERTRALINE HYDROCHLORIDE 25 MG/1
25 TABLET, FILM COATED ORAL DAILY
Qty: 90 TABLET | Refills: 1 | Status: SHIPPED | OUTPATIENT
Start: 2023-10-30

## 2023-11-01 ENCOUNTER — TELEPHONE (OUTPATIENT)
Dept: CARDIOLOGY CLINIC | Age: 63
End: 2023-11-01

## 2023-11-01 NOTE — TELEPHONE ENCOUNTER
PATIENT CALLED OFFICE REQUESTING RESULTS OF TESTING. HAD STRESS TEST AND ECHOCARDIOGRAM DONE.     PLEASE ADVISE

## 2023-11-01 NOTE — TELEPHONE ENCOUNTER
Patient notified that Dr. Demarcus Ingram was able to review his stress test and echo results. Patient notified via phone.

## 2023-11-07 DIAGNOSIS — M48.061 SPINAL STENOSIS AT L4-L5 LEVEL: ICD-10-CM

## 2023-11-07 RX ORDER — GABAPENTIN 300 MG/1
300 CAPSULE ORAL 3 TIMES DAILY
Qty: 90 CAPSULE | Refills: 2 | OUTPATIENT
Start: 2023-11-07

## 2023-11-07 RX ORDER — LOSARTAN POTASSIUM 25 MG/1
25 TABLET ORAL DAILY
Qty: 30 TABLET | Refills: 2 | OUTPATIENT
Start: 2023-11-07

## 2023-11-09 DIAGNOSIS — M48.061 SPINAL STENOSIS AT L4-L5 LEVEL: ICD-10-CM

## 2023-11-09 RX ORDER — LOSARTAN POTASSIUM 25 MG/1
25 TABLET ORAL DAILY
Qty: 30 TABLET | Refills: 5 | OUTPATIENT
Start: 2023-11-09

## 2023-11-09 RX ORDER — ATORVASTATIN CALCIUM 20 MG/1
20 TABLET, FILM COATED ORAL DAILY
Qty: 30 TABLET | Refills: 2 | OUTPATIENT
Start: 2023-11-09

## 2023-11-09 RX ORDER — GABAPENTIN 300 MG/1
300 CAPSULE ORAL 3 TIMES DAILY
Qty: 90 CAPSULE | Refills: 2 | OUTPATIENT
Start: 2023-11-09 | End: 2024-02-07

## 2023-11-09 NOTE — TELEPHONE ENCOUNTER
Pt requesting medication refill. Please approve or deny this request.    Rx requested:  Requested Prescriptions     Pending Prescriptions Disp Refills    losartan (COZAAR) 25 MG tablet 30 tablet 5     Sig: Take 1 tablet by mouth daily    gabapentin (NEURONTIN) 300 MG capsule 90 capsule 2     Sig: Take 1 capsule by mouth 3 times daily for 90 days. Intended supply: 90 days    atorvastatin (LIPITOR) 20 MG tablet 30 tablet 2     Sig: Take 1 tablet by mouth daily         Last Office Visit:   8/14/2023      Next Visit Date:  Future Appointments   Date Time Provider Department Center   11/14/2023 10:45 AM Lizandro Ruffin MD [facility]

## 2023-11-14 ENCOUNTER — OFFICE VISIT (OUTPATIENT)
Dept: PRIMARY CARE CLINIC | Age: 63
End: 2023-11-14
Payer: COMMERCIAL

## 2023-11-14 VITALS
BODY MASS INDEX: 23.82 KG/M2 | DIASTOLIC BLOOD PRESSURE: 80 MMHG | HEART RATE: 96 BPM | WEIGHT: 166 LBS | SYSTOLIC BLOOD PRESSURE: 134 MMHG | OXYGEN SATURATION: 96 % | TEMPERATURE: 97.5 F

## 2023-11-14 DIAGNOSIS — M48.061 SPINAL STENOSIS AT L4-L5 LEVEL: Primary | ICD-10-CM

## 2023-11-14 DIAGNOSIS — I10 ESSENTIAL (PRIMARY) HYPERTENSION: ICD-10-CM

## 2023-11-14 DIAGNOSIS — I70.212 ATHEROSCLEROSIS OF NATIVE ARTERY OF LEFT LOWER EXTREMITY WITH INTERMITTENT CLAUDICATION (HCC): ICD-10-CM

## 2023-11-14 DIAGNOSIS — K21.9 GASTROESOPHAGEAL REFLUX DISEASE, UNSPECIFIED WHETHER ESOPHAGITIS PRESENT: ICD-10-CM

## 2023-11-14 PROBLEM — I74.3 EMBOLISM AND THROMBOSIS OF ARTERIES OF LOWER EXTREMITY (HCC): Status: ACTIVE | Noted: 2023-09-18

## 2023-11-14 PROBLEM — F41.1 ANXIETY STATE: Status: ACTIVE | Noted: 2023-09-18

## 2023-11-14 PROCEDURE — 3079F DIAST BP 80-89 MM HG: CPT | Performed by: STUDENT IN AN ORGANIZED HEALTH CARE EDUCATION/TRAINING PROGRAM

## 2023-11-14 PROCEDURE — 3075F SYST BP GE 130 - 139MM HG: CPT | Performed by: STUDENT IN AN ORGANIZED HEALTH CARE EDUCATION/TRAINING PROGRAM

## 2023-11-14 PROCEDURE — 99214 OFFICE O/P EST MOD 30 MIN: CPT | Performed by: STUDENT IN AN ORGANIZED HEALTH CARE EDUCATION/TRAINING PROGRAM

## 2023-11-14 RX ORDER — GABAPENTIN 400 MG/1
400 CAPSULE ORAL 3 TIMES DAILY
Qty: 270 CAPSULE | Refills: 0 | Status: SHIPPED | OUTPATIENT
Start: 2023-11-14 | End: 2024-02-12

## 2023-11-14 RX ORDER — LOSARTAN POTASSIUM 25 MG/1
25 TABLET ORAL DAILY
Qty: 90 TABLET | Refills: 0 | Status: SHIPPED | OUTPATIENT
Start: 2023-11-14

## 2023-11-14 RX ORDER — OMEPRAZOLE 40 MG/1
40 CAPSULE, DELAYED RELEASE ORAL
Qty: 90 CAPSULE | Refills: 0 | Status: SHIPPED | OUTPATIENT
Start: 2023-11-14

## 2023-11-14 RX ORDER — ATORVASTATIN CALCIUM 20 MG/1
20 TABLET, FILM COATED ORAL DAILY
Qty: 90 TABLET | Refills: 0 | Status: SHIPPED | OUTPATIENT
Start: 2023-11-14

## 2023-11-14 RX ORDER — GABAPENTIN 300 MG/1
300 CAPSULE ORAL 3 TIMES DAILY
Qty: 90 CAPSULE | Refills: 2 | Status: CANCELLED | OUTPATIENT
Start: 2023-11-14 | End: 2024-02-12

## 2023-11-14 NOTE — PROGRESS NOTES
rhythm. Pulses: Normal pulses. Heart sounds: Normal heart sounds. Pulmonary:      Effort: No respiratory distress. Breath sounds: Normal breath sounds. Abdominal:      Tenderness: There is no abdominal tenderness. Musculoskeletal:         General: Tenderness present. Cervical back: Normal range of motion. Comments: SLR positive on left, TTP over bilateral lower lumbar areas and spinal lumbar area. Lawrence David' sign negative bilaterally, no calf tenderness bilaterally   Skin:     Findings: No rash. Neurological:      General: No focal deficit present. Mental Status: He is alert and oriented to person, place, and time. Sensory: Sensory deficit (Sensory deficits over the ventral and dorsal aspect of the left foot) present. Psychiatric:         Mood and Affect: Mood normal.               Reviewed with the patient: current clinical status, medications, activities and diet. Side effects, adverse effects of the medication prescribed today, as well as treatment plan/ rationale and result expectations have been discussed with the patient who expresses understanding and desires to proceed. Close follow up to evaluate treatment results and for coordination of care. I have reviewed the patient's medical history in detail and updated the computerized patient record.     Juan Pablo Webster MD

## 2023-11-15 DIAGNOSIS — M48.061 SPINAL STENOSIS AT L4-L5 LEVEL: ICD-10-CM

## 2023-11-18 LAB
6MAM UR QL: NOT DETECTED
7-AMINOCLONAZEPAM: NOT DETECTED
ALPHA-OH-ALPRAZOLAM: NOT DETECTED
ALPHA-OH-MIDAZOLAM, URINE: NOT DETECTED
ALPRAZOLAM: NOT DETECTED
AMPHET UR QL SCN: NOT DETECTED
BARBITURATES: NEGATIVE
BENZOYLECGONINE: NEGATIVE
BUPRENORPHINE: NOT DETECTED
CARISOPRODOL UR QL: NEGATIVE
CLONAZEPAM UR QL: NOT DETECTED
CODEINE: NOT DETECTED
CREAT UR-MCNC: 170.1 MG/DL (ref 20–400)
DIAZEPAM: NOT DETECTED
ETHYL GLUCURONIDE: NEGATIVE
FENTANYL UR QL: NOT DETECTED
GABAPENTIN: PRESENT
HYDROCODONE UR QL: NOT DETECTED
HYDROMORPHONE: NOT DETECTED
LORAZEPAM UR QL: NOT DETECTED
MARIJUANA METABOLITE: NORMAL
MDA: NOT DETECTED
MDEA: NOT DETECTED
MDMA UR QL: NOT DETECTED
MEPERIDINE: NOT DETECTED
METHADONE: NEGATIVE
METHAMPHETAMINE: NOT DETECTED
METHYLPHENIDATE: NOT DETECTED
MIDAZOLAM UR QL SCN: NOT DETECTED
MORPHINE: NOT DETECTED
NALOXONE: NOT DETECTED
NORBUPRENORPHINE, FREE: NOT DETECTED
NORDIAZEPAM: NOT DETECTED
NORFENTANYL: NOT DETECTED
NORHYDROCODONE, URINE: NOT DETECTED
NOROXYCODONE: NOT DETECTED
NOROXYMORPHONE, URINE: NOT DETECTED
OXAZEPAM UR QL: NOT DETECTED
OXYCODONE UR QL: NOT DETECTED
OXYMORPHONE UR QL: NOT DETECTED
PAIN MANAGEMENT DRUG PANEL: NORMAL
PATHOLOGY STUDY: NORMAL
PCP: NEGATIVE
PHENTERMINE: NOT DETECTED
PREGABALIN: NOT DETECTED
TAPENTADOL, URINE: NOT DETECTED
TAPENTADOL-O-SULFATE, URINE: NOT DETECTED
TEMAZEPAM: NOT DETECTED
TRAMADOL: NEGATIVE
ZOLPIDEM: NOT DETECTED

## 2023-12-14 ENCOUNTER — OFFICE VISIT (OUTPATIENT)
Dept: PRIMARY CARE CLINIC | Age: 63
End: 2023-12-14
Payer: COMMERCIAL

## 2023-12-14 ENCOUNTER — HOSPITAL ENCOUNTER (OUTPATIENT)
Age: 63
Setting detail: SURGERY ADMIT
Discharge: HOME | End: 2023-12-14
Attending: SURGERY | Admitting: SURGERY
Payer: COMMERCIAL

## 2023-12-14 ENCOUNTER — APPOINTMENT (OUTPATIENT)
Dept: GENERAL RADIOLOGY | Age: 63
End: 2023-12-14
Attending: SURGERY
Payer: COMMERCIAL

## 2023-12-14 VITALS
WEIGHT: 165 LBS | OXYGEN SATURATION: 94 % | TEMPERATURE: 98.1 F | HEART RATE: 79 BPM | BODY MASS INDEX: 23.68 KG/M2 | SYSTOLIC BLOOD PRESSURE: 130 MMHG | DIASTOLIC BLOOD PRESSURE: 70 MMHG

## 2023-12-14 VITALS
SYSTOLIC BLOOD PRESSURE: 158 MMHG | HEART RATE: 57 BPM | OXYGEN SATURATION: 98 % | HEIGHT: 70 IN | TEMPERATURE: 97.1 F | DIASTOLIC BLOOD PRESSURE: 95 MMHG | BODY MASS INDEX: 23.62 KG/M2 | RESPIRATION RATE: 15 BRPM | WEIGHT: 165 LBS

## 2023-12-14 DIAGNOSIS — K52.9 GASTROENTERITIS: Primary | ICD-10-CM

## 2023-12-14 DIAGNOSIS — F33.1 MODERATE EPISODE OF RECURRENT MAJOR DEPRESSIVE DISORDER (HCC): ICD-10-CM

## 2023-12-14 DIAGNOSIS — F41.1 ANXIETY STATE: ICD-10-CM

## 2023-12-14 DIAGNOSIS — F32.A DEPRESSION, UNSPECIFIED DEPRESSION TYPE: ICD-10-CM

## 2023-12-14 LAB
ABO + RH BLD: NORMAL
ANION GAP SERPL CALCULATED.3IONS-SCNC: 9 MEQ/L (ref 9–15)
BASOPHILS # BLD: 0.1 K/UL (ref 0–0.2)
BASOPHILS NFR BLD: 0.7 %
BLD GP AB SCN SERPL QL: NORMAL
BUN SERPL-MCNC: 11 MG/DL (ref 8–23)
CALCIUM SERPL-MCNC: 9 MG/DL (ref 8.5–9.9)
CHLORIDE SERPL-SCNC: 105 MEQ/L (ref 95–107)
CO2 SERPL-SCNC: 23 MEQ/L (ref 20–31)
CREAT SERPL-MCNC: 0.71 MG/DL (ref 0.7–1.2)
EOSINOPHIL # BLD: 0.4 K/UL (ref 0–0.7)
EOSINOPHIL NFR BLD: 3.2 %
ERYTHROCYTE [DISTWIDTH] IN BLOOD BY AUTOMATED COUNT: 14.5 % (ref 11.5–14.5)
GLUCOSE SERPL-MCNC: 125 MG/DL (ref 70–99)
HCT VFR BLD AUTO: 45.1 % (ref 42–52)
HGB BLD-MCNC: 15.5 G/DL (ref 14–18)
INFLUENZA A ANTIBODY: NORMAL
INFLUENZA B ANTIBODY: NORMAL
INR PPP: 1
LYMPHOCYTES # BLD: 2.2 K/UL (ref 1–4.8)
LYMPHOCYTES NFR BLD: 18.4 %
Lab: NORMAL
MCH RBC QN AUTO: 30 PG (ref 27–31.3)
MCHC RBC AUTO-ENTMCNC: 34.4 % (ref 33–37)
MCV RBC AUTO: 87.2 FL (ref 79–92.2)
MONOCYTES # BLD: 0.9 K/UL (ref 0.2–0.8)
MONOCYTES NFR BLD: 7.8 %
NEUTROPHILS # BLD: 8.4 K/UL (ref 1.4–6.5)
NEUTS SEG NFR BLD: 69.4 %
PERFORMING INSTRUMENT: NORMAL
PLATELET # BLD AUTO: 223 K/UL (ref 130–400)
POTASSIUM SERPL-SCNC: 4 MEQ/L (ref 3.4–4.9)
PROTHROMBIN TIME: 13.3 SEC (ref 12.3–14.9)
QC PASS/FAIL: NORMAL
RBC # BLD AUTO: 5.17 M/UL (ref 4.7–6.1)
SARS-COV-2, POC: NORMAL
SODIUM SERPL-SCNC: 137 MEQ/L (ref 135–144)
WBC # BLD AUTO: 12 K/UL (ref 4.8–10.8)

## 2023-12-14 PROCEDURE — 80048 BASIC METABOLIC PNL TOTAL CA: CPT

## 2023-12-14 PROCEDURE — 6370000000 HC RX 637 (ALT 250 FOR IP)

## 2023-12-14 PROCEDURE — 85025 COMPLETE CBC W/AUTO DIFF WBC: CPT

## 2023-12-14 PROCEDURE — 87804 INFLUENZA ASSAY W/OPTIC: CPT | Performed by: STUDENT IN AN ORGANIZED HEALTH CARE EDUCATION/TRAINING PROGRAM

## 2023-12-14 PROCEDURE — 85610 PROTHROMBIN TIME: CPT

## 2023-12-14 PROCEDURE — 86900 BLOOD TYPING SEROLOGIC ABO: CPT

## 2023-12-14 PROCEDURE — 99214 OFFICE O/P EST MOD 30 MIN: CPT | Performed by: STUDENT IN AN ORGANIZED HEALTH CARE EDUCATION/TRAINING PROGRAM

## 2023-12-14 PROCEDURE — 87426 SARSCOV CORONAVIRUS AG IA: CPT | Performed by: STUDENT IN AN ORGANIZED HEALTH CARE EDUCATION/TRAINING PROGRAM

## 2023-12-14 PROCEDURE — 86850 RBC ANTIBODY SCREEN: CPT

## 2023-12-14 PROCEDURE — 86901 BLOOD TYPING SEROLOGIC RH(D): CPT

## 2023-12-14 RX ORDER — SODIUM CHLORIDE, SODIUM LACTATE, POTASSIUM CHLORIDE, CALCIUM CHLORIDE 600; 310; 30; 20 MG/100ML; MG/100ML; MG/100ML; MG/100ML
INJECTION, SOLUTION INTRAVENOUS CONTINUOUS
Status: DISCONTINUED | OUTPATIENT
Start: 2023-12-14 | End: 2023-12-14 | Stop reason: HOSPADM

## 2023-12-14 RX ORDER — SERTRALINE HYDROCHLORIDE 25 MG/1
50 TABLET, FILM COATED ORAL DAILY
Qty: 90 TABLET | Refills: 1 | Status: SHIPPED | OUTPATIENT
Start: 2023-12-14

## 2023-12-14 ASSESSMENT — PAIN DESCRIPTION - LOCATION: LOCATION: FOOT

## 2023-12-14 ASSESSMENT — PAIN SCALES - GENERAL: PAINLEVEL_OUTOF10: 9

## 2023-12-14 ASSESSMENT — PAIN DESCRIPTION - ORIENTATION: ORIENTATION: LEFT

## 2023-12-14 ASSESSMENT — PAIN DESCRIPTION - DESCRIPTORS: DESCRIPTORS: OTHER (COMMENT)

## 2023-12-14 ASSESSMENT — PAIN DESCRIPTION - PAIN TYPE: TYPE: ACUTE PAIN

## 2023-12-14 NOTE — PROGRESS NOTES
12/14/2023        Dwight Garrison 1960 is a 66 y.o. male who presents today with:  Chief Complaint   Patient presents with    Nausea & Vomiting    Diarrhea     X1   tx n/a       HPI:   Diarrhea and vomiting since this morning. States there was no blood. Had 3 episodes of diarrhea. He had a knee surgery planned today and it was cancelled. He came for infection testing. He also admits to chills and slight aches. Depression/anxiety-he states sertraline 25 mg daily has not been working effectively and there were a couple times he took 2 tablets daily. He would like to increase the dose. Patient denies any paranoia, visual or auditory hallucinations, suicidal or homicidal ideations. Assessment & Plan   1. Gastroenteritis  -     POCT Influenza A/B  -     POCT COVID-19, Antigen  2. Anxiety state  3. Depression, unspecified depression type  4. Moderate episode of recurrent major depressive disorder (HCC)  -     sertraline (ZOLOFT) 25 MG tablet; Take 2 tablets by mouth daily, Disp-90 tablet, R-1Normal  Sig was adjusted, medication was not filled. He will take 2 tablets daily until he runs out then assess 50 mg or another increased to 75 mg. Medications Discontinued During This Encounter   Medication Reason    sertraline (ZOLOFT) 25 MG tablet DOSE ADJUSTMENT     Return if symptoms worsen or fail to improve. All testing negative today new line most likely gastroenteritis. Recommended Imodium for diarrhea and OTC treatment as well as replenishing electrolytes and fluids.     Objective  No Known Allergies  Current Outpatient Medications   Medication Sig Dispense Refill    sertraline (ZOLOFT) 25 MG tablet Take 2 tablets by mouth daily 90 tablet 1    atorvastatin (LIPITOR) 20 MG tablet Take 1 tablet by mouth daily 90 tablet 0    losartan (COZAAR) 25 MG tablet Take 1 tablet by mouth daily 90 tablet 0    omeprazole (PRILOSEC) 40 MG delayed release capsule Take 1 capsule by mouth every morning (before breakfast)

## 2023-12-14 NOTE — PROGRESS NOTES
Dr Jose D Leon perfect served due to patient's c/o nausea, vomiting and diarrhea that started during the night and this morning. Per Dr Jose D Leon the surgery is to be canceled and patient is to see his PCP and then contact his office to reschedule. Patient and girlfriend advised and will follow up with PCP.

## 2023-12-14 NOTE — PATIENT INSTRUCTIONS
Testing negative today. It is likely you have a viral infection as 90% of  infections are viral.  The following treatments below are recommended for your symptoms. Please try these and reach out if your symptoms have not improved after 10 days from when they began.   -Vitamin C 1000 mg daily for 3 to 5 days  -Tylenol or ibuprofen for aches and chills and fever  -Sandee pot/saline nasal rinses/Flonase for nasal congestion, sinus pressure, nasal drainage  -Cepacol throat lozenges, Vicks vapocool, or Chloraseptic throat spray for throat pain  -Mucinex for just chest congestion, or Mucinex DM for chest congestion and cough   -Imodium for diarrhea

## 2023-12-29 DIAGNOSIS — F33.1 MODERATE EPISODE OF RECURRENT MAJOR DEPRESSIVE DISORDER (HCC): ICD-10-CM

## 2023-12-29 RX ORDER — SERTRALINE HYDROCHLORIDE 25 MG/1
50 TABLET, FILM COATED ORAL DAILY
Qty: 90 TABLET | Refills: 1 | Status: SHIPPED | OUTPATIENT
Start: 2023-12-29

## 2023-12-29 NOTE — TELEPHONE ENCOUNTER
Pt requesting medication refill. Please approve or deny this request.    Rx requested:  Requested Prescriptions     Pending Prescriptions Disp Refills    sertraline (ZOLOFT) 25 MG tablet 90 tablet 1     Sig: Take 2 tablets by mouth daily         Last Office Visit:   12/14/2023      Next Visit Date:  No future appointments.

## 2024-01-03 ENCOUNTER — ANESTHESIA EVENT (OUTPATIENT)
Dept: OPERATING ROOM | Age: 64
End: 2024-01-03
Payer: COMMERCIAL

## 2024-01-03 ASSESSMENT — LIFESTYLE VARIABLES: SMOKING_STATUS: 1

## 2024-01-03 NOTE — ANESTHESIA PRE PROCEDURE
Department of Anesthesiology  Preprocedure Note       Name:  Garry Gamez   Age:  63 y.o.  :  1960                                          MRN:  [id number]         Date:  2024      Surgeon: Surgeon(s):  Naomi Gallardo MD    Procedure: Procedure(s):  LEFT FEMORAL TO POPLITEAL BYPASS WITH VEIN VS. SYNTHETIC BYPASS GRAPH  T&S ON ARRIVAL AND ALL OTHER LABS & H&P IN SYSTEM  C-ARM    Medications prior to admission:   Prior to Admission medications    Medication Sig Start Date End Date Taking? Authorizing Provider   ASPIRIN 81 PO Take by mouth daily   Yes Provider, MD Jay   sertraline (ZOLOFT) 25 MG tablet Take 2 tablets by mouth daily 23   Lizandro Ruffin MD   atorvastatin (LIPITOR) 20 MG tablet Take 1 tablet by mouth daily 23   Lizandro Ruffin MD   losartan (COZAAR) 25 MG tablet Take 1 tablet by mouth daily 23   Lizandro Ruffin MD   omeprazole (PRILOSEC) 40 MG delayed release capsule Take 1 capsule by mouth every morning (before breakfast) 23   Lizandro Ruffin MD   gabapentin (NEURONTIN) 400 MG capsule Take 1 capsule by mouth 3 times daily for 90 days. 23  Lizandro Ruffin MD   Multiple Vitamin (MULTIVITAMIN) TABS tablet Take 1 tablet by mouth daily 2/15/22   John Cordero MD       Current medications:    Current Facility-Administered Medications   Medication Dose Route Frequency Provider Last Rate Last Admin   • ceFAZolin (ANCEF) 2,000 mg in sodium chloride 0.9 % 100 mL IVPB (mini-bag)  2,000 mg IntraVENous On Call to OR Naomi Gallardo MD       • lactated ringers IV soln infusion   IntraVENous Continuous Ramses Burgos  mL/hr at 24 0635 New Bag at 24 0635   • sodium chloride 0.9 % bolus 500 mL  500 mL Intra-arTERial Once Ramses Burgos DO           Allergies:  No Known Allergies    Problem List:    Patient Active Problem List   Diagnosis Code   • Substance induced mood disorder (HCC) F19.94

## 2024-01-04 ENCOUNTER — APPOINTMENT (OUTPATIENT)
Dept: GENERAL RADIOLOGY | Age: 64
DRG: 181 | End: 2024-01-04
Attending: SURGERY
Payer: COMMERCIAL

## 2024-01-04 ENCOUNTER — HOSPITAL ENCOUNTER (INPATIENT)
Age: 64
LOS: 3 days | Discharge: HOME HEALTH | DRG: 181 | End: 2024-01-07
Attending: SURGERY | Admitting: SURGERY
Payer: COMMERCIAL

## 2024-01-04 ENCOUNTER — ANESTHESIA (OUTPATIENT)
Dept: OPERATING ROOM | Age: 64
End: 2024-01-04
Payer: COMMERCIAL

## 2024-01-04 DIAGNOSIS — I70.322 REST PAIN OF LEFT LOWER EXTREMITY CONCURRENT WITH AND DUE TO ATHEROSCLEROSIS OF BYPASS GRAFT (HCC): Primary | ICD-10-CM

## 2024-01-04 LAB
ABO + RH BLD: NORMAL
ANION GAP SERPL CALCULATED.3IONS-SCNC: 11 MEQ/L (ref 9–15)
BLD GP AB SCN SERPL QL: NORMAL
BUN SERPL-MCNC: 7 MG/DL (ref 8–23)
CALCIUM SERPL-MCNC: 8.2 MG/DL (ref 8.5–9.9)
CHLORIDE SERPL-SCNC: 103 MEQ/L (ref 95–107)
CO2 SERPL-SCNC: 23 MEQ/L (ref 20–31)
CREAT SERPL-MCNC: 0.58 MG/DL (ref 0.7–1.2)
ERYTHROCYTE [DISTWIDTH] IN BLOOD BY AUTOMATED COUNT: 14.9 % (ref 11.5–14.5)
GLUCOSE SERPL-MCNC: 137 MG/DL (ref 70–99)
HCT VFR BLD AUTO: 37.6 % (ref 42–52)
HGB BLD-MCNC: 12.5 G/DL (ref 14–18)
MCH RBC QN AUTO: 29.7 PG (ref 27–31.3)
MCHC RBC AUTO-ENTMCNC: 33.2 % (ref 33–37)
MCV RBC AUTO: 89.3 FL (ref 79–92.2)
PLATELET # BLD AUTO: 198 K/UL (ref 130–400)
POTASSIUM SERPL-SCNC: 3.8 MEQ/L (ref 3.4–4.9)
POTASSIUM SERPL-SCNC: 4.4 MEQ/L (ref 3.4–4.9)
RBC # BLD AUTO: 4.21 M/UL (ref 4.7–6.1)
SODIUM SERPL-SCNC: 137 MEQ/L (ref 135–144)
WBC # BLD AUTO: 14.6 K/UL (ref 4.8–10.8)

## 2024-01-04 PROCEDURE — 2580000003 HC RX 258: Performed by: SURGERY

## 2024-01-04 PROCEDURE — 86850 RBC ANTIBODY SCREEN: CPT

## 2024-01-04 PROCEDURE — C1768 GRAFT, VASCULAR: HCPCS | Performed by: SURGERY

## 2024-01-04 PROCEDURE — 85027 COMPLETE CBC AUTOMATED: CPT

## 2024-01-04 PROCEDURE — 2580000003 HC RX 258: Performed by: STUDENT IN AN ORGANIZED HEALTH CARE EDUCATION/TRAINING PROGRAM

## 2024-01-04 PROCEDURE — 76000 FLUOROSCOPY <1 HR PHYS/QHP: CPT

## 2024-01-04 PROCEDURE — 3600000014 HC SURGERY LEVEL 4 ADDTL 15MIN: Performed by: SURGERY

## 2024-01-04 PROCEDURE — 6360000002 HC RX W HCPCS: Performed by: NURSE ANESTHETIST, CERTIFIED REGISTERED

## 2024-01-04 PROCEDURE — 2500000003 HC RX 250 WO HCPCS: Performed by: STUDENT IN AN ORGANIZED HEALTH CARE EDUCATION/TRAINING PROGRAM

## 2024-01-04 PROCEDURE — 6360000002 HC RX W HCPCS: Performed by: SURGERY

## 2024-01-04 PROCEDURE — B41G1ZZ FLUOROSCOPY OF LEFT LOWER EXTREMITY ARTERIES USING LOW OSMOLAR CONTRAST: ICD-10-PCS | Performed by: SURGERY

## 2024-01-04 PROCEDURE — 86901 BLOOD TYPING SEROLOGIC RH(D): CPT

## 2024-01-04 PROCEDURE — 2580000003 HC RX 258: Performed by: NURSE ANESTHETIST, CERTIFIED REGISTERED

## 2024-01-04 PROCEDURE — 6370000000 HC RX 637 (ALT 250 FOR IP): Performed by: SURGERY

## 2024-01-04 PROCEDURE — 80048 BASIC METABOLIC PNL TOTAL CA: CPT

## 2024-01-04 PROCEDURE — 3600000004 HC SURGERY LEVEL 4 BASE: Performed by: SURGERY

## 2024-01-04 PROCEDURE — 3700000000 HC ANESTHESIA ATTENDED CARE: Performed by: SURGERY

## 2024-01-04 PROCEDURE — 94761 N-INVAS EAR/PLS OXIMETRY MLT: CPT

## 2024-01-04 PROCEDURE — 2500000003 HC RX 250 WO HCPCS: Performed by: NURSE PRACTITIONER

## 2024-01-04 PROCEDURE — 2700000000 HC OXYGEN THERAPY PER DAY

## 2024-01-04 PROCEDURE — 99223 1ST HOSP IP/OBS HIGH 75: CPT | Performed by: INTERNAL MEDICINE

## 2024-01-04 PROCEDURE — 2000000000 HC ICU R&B

## 2024-01-04 PROCEDURE — 2500000003 HC RX 250 WO HCPCS: Performed by: SURGERY

## 2024-01-04 PROCEDURE — 84132 ASSAY OF SERUM POTASSIUM: CPT

## 2024-01-04 PROCEDURE — 041L0JL BYPASS LEFT FEMORAL ARTERY TO POPLITEAL ARTERY WITH SYNTHETIC SUBSTITUTE, OPEN APPROACH: ICD-10-PCS | Performed by: SURGERY

## 2024-01-04 PROCEDURE — 86900 BLOOD TYPING SEROLOGIC ABO: CPT

## 2024-01-04 PROCEDURE — P9041 ALBUMIN (HUMAN),5%, 50ML: HCPCS | Performed by: NURSE ANESTHETIST, CERTIFIED REGISTERED

## 2024-01-04 PROCEDURE — 2580000003 HC RX 258: Performed by: NURSE PRACTITIONER

## 2024-01-04 PROCEDURE — C1713 ANCHOR/SCREW BN/BN,TIS/BN: HCPCS | Performed by: SURGERY

## 2024-01-04 PROCEDURE — 2500000003 HC RX 250 WO HCPCS: Performed by: NURSE ANESTHETIST, CERTIFIED REGISTERED

## 2024-01-04 PROCEDURE — 85347 COAGULATION TIME ACTIVATED: CPT

## 2024-01-04 PROCEDURE — 6360000002 HC RX W HCPCS: Performed by: NURSE PRACTITIONER

## 2024-01-04 PROCEDURE — 3700000001 HC ADD 15 MINUTES (ANESTHESIA): Performed by: SURGERY

## 2024-01-04 PROCEDURE — 2709999900 HC NON-CHARGEABLE SUPPLY: Performed by: SURGERY

## 2024-01-04 PROCEDURE — 6370000000 HC RX 637 (ALT 250 FOR IP)

## 2024-01-04 PROCEDURE — A4217 STERILE WATER/SALINE, 500 ML: HCPCS | Performed by: SURGERY

## 2024-01-04 DEVICE — PROPATEN VASCULAR GRAFT TW RR 7MMX80CM 60CM RINGS HEPARIN
Type: IMPLANTABLE DEVICE | Site: LEG | Status: FUNCTIONAL
Brand: GORE PROPATEN VASCULAR GRAFT

## 2024-01-04 RX ORDER — POLYETHYLENE GLYCOL 3350 17 G/17G
17 POWDER, FOR SOLUTION ORAL DAILY PRN
Status: DISCONTINUED | OUTPATIENT
Start: 2024-01-04 | End: 2024-01-05

## 2024-01-04 RX ORDER — HEPARIN SODIUM 1000 [USP'U]/ML
INJECTION, SOLUTION INTRAVENOUS; SUBCUTANEOUS PRN
Status: DISCONTINUED | OUTPATIENT
Start: 2024-01-04 | End: 2024-01-04 | Stop reason: SDUPTHER

## 2024-01-04 RX ORDER — SODIUM CHLORIDE, SODIUM LACTATE, POTASSIUM CHLORIDE, CALCIUM CHLORIDE 600; 310; 30; 20 MG/100ML; MG/100ML; MG/100ML; MG/100ML
INJECTION, SOLUTION INTRAVENOUS CONTINUOUS PRN
Status: DISCONTINUED | OUTPATIENT
Start: 2024-01-04 | End: 2024-01-04 | Stop reason: SDUPTHER

## 2024-01-04 RX ORDER — SODIUM CHLORIDE 0.9 % (FLUSH) 0.9 %
5-40 SYRINGE (ML) INJECTION EVERY 12 HOURS SCHEDULED
Status: DISCONTINUED | OUTPATIENT
Start: 2024-01-04 | End: 2024-01-07 | Stop reason: HOSPADM

## 2024-01-04 RX ORDER — ONDANSETRON 2 MG/ML
4 INJECTION INTRAMUSCULAR; INTRAVENOUS EVERY 6 HOURS PRN
Status: DISCONTINUED | OUTPATIENT
Start: 2024-01-04 | End: 2024-01-07 | Stop reason: HOSPADM

## 2024-01-04 RX ORDER — LOSARTAN POTASSIUM 25 MG/1
25 TABLET ORAL DAILY
Status: DISCONTINUED | OUTPATIENT
Start: 2024-01-04 | End: 2024-01-07 | Stop reason: HOSPADM

## 2024-01-04 RX ORDER — LIDOCAINE HYDROCHLORIDE 20 MG/ML
INJECTION, SOLUTION INTRAVENOUS PRN
Status: DISCONTINUED | OUTPATIENT
Start: 2024-01-04 | End: 2024-01-04 | Stop reason: SDUPTHER

## 2024-01-04 RX ORDER — ASPIRIN 81 MG/1
81 TABLET ORAL DAILY
Status: DISCONTINUED | OUTPATIENT
Start: 2024-01-05 | End: 2024-01-07 | Stop reason: HOSPADM

## 2024-01-04 RX ORDER — EPHEDRINE SULFATE/0.9% NACL/PF 50 MG/5 ML
SYRINGE (ML) INTRAVENOUS PRN
Status: DISCONTINUED | OUTPATIENT
Start: 2024-01-04 | End: 2024-01-04 | Stop reason: SDUPTHER

## 2024-01-04 RX ORDER — ONDANSETRON 4 MG/1
4 TABLET, ORALLY DISINTEGRATING ORAL EVERY 8 HOURS PRN
Status: DISCONTINUED | OUTPATIENT
Start: 2024-01-04 | End: 2024-01-07 | Stop reason: HOSPADM

## 2024-01-04 RX ORDER — ATORVASTATIN CALCIUM 20 MG/1
20 TABLET, FILM COATED ORAL
Status: DISCONTINUED | OUTPATIENT
Start: 2024-01-05 | End: 2024-01-07 | Stop reason: HOSPADM

## 2024-01-04 RX ORDER — MAGNESIUM SULFATE IN WATER 40 MG/ML
2000 INJECTION, SOLUTION INTRAVENOUS PRN
Status: DISCONTINUED | OUTPATIENT
Start: 2024-01-04 | End: 2024-01-07 | Stop reason: HOSPADM

## 2024-01-04 RX ORDER — POTASSIUM CHLORIDE 20 MEQ/1
40 TABLET, EXTENDED RELEASE ORAL PRN
Status: DISCONTINUED | OUTPATIENT
Start: 2024-01-04 | End: 2024-01-07 | Stop reason: HOSPADM

## 2024-01-04 RX ORDER — PROPOFOL 10 MG/ML
INJECTION, EMULSION INTRAVENOUS PRN
Status: DISCONTINUED | OUTPATIENT
Start: 2024-01-04 | End: 2024-01-04 | Stop reason: SDUPTHER

## 2024-01-04 RX ORDER — SERTRALINE HYDROCHLORIDE 25 MG/1
50 TABLET, FILM COATED ORAL DAILY
Status: DISCONTINUED | OUTPATIENT
Start: 2024-01-04 | End: 2024-01-07 | Stop reason: HOSPADM

## 2024-01-04 RX ORDER — ONDANSETRON 2 MG/ML
INJECTION INTRAMUSCULAR; INTRAVENOUS PRN
Status: DISCONTINUED | OUTPATIENT
Start: 2024-01-04 | End: 2024-01-04 | Stop reason: SDUPTHER

## 2024-01-04 RX ORDER — 0.9 % SODIUM CHLORIDE 0.9 %
500 INTRAVENOUS SOLUTION INTRAVENOUS ONCE
Status: DISCONTINUED | OUTPATIENT
Start: 2024-01-04 | End: 2024-01-04

## 2024-01-04 RX ORDER — MIDAZOLAM HYDROCHLORIDE 1 MG/ML
INJECTION INTRAMUSCULAR; INTRAVENOUS PRN
Status: DISCONTINUED | OUTPATIENT
Start: 2024-01-04 | End: 2024-01-04 | Stop reason: SDUPTHER

## 2024-01-04 RX ORDER — ROCURONIUM BROMIDE 10 MG/ML
INJECTION, SOLUTION INTRAVENOUS PRN
Status: DISCONTINUED | OUTPATIENT
Start: 2024-01-04 | End: 2024-01-04 | Stop reason: SDUPTHER

## 2024-01-04 RX ORDER — HYDRALAZINE HYDROCHLORIDE 20 MG/ML
20 INJECTION INTRAMUSCULAR; INTRAVENOUS EVERY 4 HOURS PRN
Status: DISCONTINUED | OUTPATIENT
Start: 2024-01-04 | End: 2024-01-07 | Stop reason: HOSPADM

## 2024-01-04 RX ORDER — SODIUM CHLORIDE 0.9 % (FLUSH) 0.9 %
5-40 SYRINGE (ML) INJECTION PRN
Status: DISCONTINUED | OUTPATIENT
Start: 2024-01-04 | End: 2024-01-07 | Stop reason: HOSPADM

## 2024-01-04 RX ORDER — LIDOCAINE HYDROCHLORIDE 10 MG/ML
2 INJECTION, SOLUTION EPIDURAL; INFILTRATION; INTRACAUDAL; PERINEURAL ONCE
Status: COMPLETED | OUTPATIENT
Start: 2024-01-04 | End: 2024-01-04

## 2024-01-04 RX ORDER — HYDROMORPHONE HYDROCHLORIDE 1 MG/ML
1 INJECTION, SOLUTION INTRAMUSCULAR; INTRAVENOUS; SUBCUTANEOUS ONCE
Status: COMPLETED | OUTPATIENT
Start: 2024-01-04 | End: 2024-01-04

## 2024-01-04 RX ORDER — MAGNESIUM HYDROXIDE 1200 MG/15ML
LIQUID ORAL CONTINUOUS PRN
Status: COMPLETED | OUTPATIENT
Start: 2024-01-04 | End: 2024-01-04

## 2024-01-04 RX ORDER — POTASSIUM CHLORIDE 7.45 MG/ML
10 INJECTION INTRAVENOUS PRN
Status: DISCONTINUED | OUTPATIENT
Start: 2024-01-04 | End: 2024-01-07 | Stop reason: HOSPADM

## 2024-01-04 RX ORDER — PROTAMINE SULFATE 10 MG/ML
INJECTION, SOLUTION INTRAVENOUS PRN
Status: DISCONTINUED | OUTPATIENT
Start: 2024-01-04 | End: 2024-01-04 | Stop reason: SDUPTHER

## 2024-01-04 RX ORDER — MORPHINE SULFATE 2 MG/ML
2 INJECTION, SOLUTION INTRAMUSCULAR; INTRAVENOUS
Status: DISCONTINUED | OUTPATIENT
Start: 2024-01-04 | End: 2024-01-04

## 2024-01-04 RX ORDER — SODIUM CHLORIDE 9 MG/ML
INJECTION, SOLUTION INTRAVENOUS PRN
Status: DISCONTINUED | OUTPATIENT
Start: 2024-01-04 | End: 2024-01-07 | Stop reason: HOSPADM

## 2024-01-04 RX ORDER — FENTANYL CITRATE 50 UG/ML
INJECTION, SOLUTION INTRAMUSCULAR; INTRAVENOUS PRN
Status: DISCONTINUED | OUTPATIENT
Start: 2024-01-04 | End: 2024-01-04 | Stop reason: SDUPTHER

## 2024-01-04 RX ORDER — POLYETHYLENE GLYCOL 3350 17 G/17G
17 POWDER, FOR SOLUTION ORAL DAILY
Status: DISCONTINUED | OUTPATIENT
Start: 2024-01-04 | End: 2024-01-07 | Stop reason: HOSPADM

## 2024-01-04 RX ORDER — NALOXONE HYDROCHLORIDE 0.4 MG/ML
INJECTION, SOLUTION INTRAMUSCULAR; INTRAVENOUS; SUBCUTANEOUS PRN
Status: DISCONTINUED | OUTPATIENT
Start: 2024-01-04 | End: 2024-01-05

## 2024-01-04 RX ORDER — SODIUM CHLORIDE AND POTASSIUM CHLORIDE 150; 900 MG/100ML; MG/100ML
INJECTION, SOLUTION INTRAVENOUS CONTINUOUS
Status: DISCONTINUED | OUTPATIENT
Start: 2024-01-04 | End: 2024-01-05

## 2024-01-04 RX ORDER — PANTOPRAZOLE SODIUM 40 MG/1
40 TABLET, DELAYED RELEASE ORAL
Status: DISCONTINUED | OUTPATIENT
Start: 2024-01-05 | End: 2024-01-07 | Stop reason: HOSPADM

## 2024-01-04 RX ORDER — GABAPENTIN 400 MG/1
400 CAPSULE ORAL 3 TIMES DAILY
Status: DISCONTINUED | OUTPATIENT
Start: 2024-01-04 | End: 2024-01-07 | Stop reason: HOSPADM

## 2024-01-04 RX ORDER — ALBUMIN, HUMAN INJ 5% 5 %
SOLUTION INTRAVENOUS PRN
Status: DISCONTINUED | OUTPATIENT
Start: 2024-01-04 | End: 2024-01-04 | Stop reason: SDUPTHER

## 2024-01-04 RX ORDER — SODIUM CHLORIDE, SODIUM LACTATE, POTASSIUM CHLORIDE, CALCIUM CHLORIDE 600; 310; 30; 20 MG/100ML; MG/100ML; MG/100ML; MG/100ML
INJECTION, SOLUTION INTRAVENOUS CONTINUOUS
Status: DISCONTINUED | OUTPATIENT
Start: 2024-01-04 | End: 2024-01-04

## 2024-01-04 RX ORDER — MORPHINE SULFATE 4 MG/ML
4 INJECTION, SOLUTION INTRAMUSCULAR; INTRAVENOUS
Status: DISCONTINUED | OUTPATIENT
Start: 2024-01-04 | End: 2024-01-04

## 2024-01-04 RX ADMIN — FENTANYL CITRATE 25 MCG: 50 INJECTION, SOLUTION INTRAMUSCULAR; INTRAVENOUS at 12:44

## 2024-01-04 RX ADMIN — Medication 10 MG: at 11:00

## 2024-01-04 RX ADMIN — SUGAMMADEX 200 MG: 100 INJECTION, SOLUTION INTRAVENOUS at 13:38

## 2024-01-04 RX ADMIN — FENTANYL CITRATE 25 MCG: 50 INJECTION, SOLUTION INTRAMUSCULAR; INTRAVENOUS at 13:51

## 2024-01-04 RX ADMIN — GABAPENTIN 400 MG: 400 CAPSULE ORAL at 16:40

## 2024-01-04 RX ADMIN — LIDOCAINE HYDROCHLORIDE 0.1 ML: 10 INJECTION, SOLUTION EPIDURAL; INFILTRATION; INTRACAUDAL; PERINEURAL at 06:35

## 2024-01-04 RX ADMIN — CEFAZOLIN 2000 MG: 2 INJECTION, POWDER, FOR SOLUTION INTRAMUSCULAR; INTRAVENOUS at 11:51

## 2024-01-04 RX ADMIN — SODIUM CHLORIDE, PRESERVATIVE FREE 10 ML: 5 INJECTION INTRAVENOUS at 20:44

## 2024-01-04 RX ADMIN — FENTANYL CITRATE 25 MCG: 50 INJECTION, SOLUTION INTRAMUSCULAR; INTRAVENOUS at 13:46

## 2024-01-04 RX ADMIN — HEPARIN SODIUM 3000 UNITS: 1000 INJECTION INTRAVENOUS; SUBCUTANEOUS at 11:41

## 2024-01-04 RX ADMIN — PROTAMINE SULFATE 20 MG: 10 INJECTION, SOLUTION INTRAVENOUS at 12:18

## 2024-01-04 RX ADMIN — SODIUM CHLORIDE, POTASSIUM CHLORIDE, SODIUM LACTATE AND CALCIUM CHLORIDE: 600; 310; 30; 20 INJECTION, SOLUTION INTRAVENOUS at 07:40

## 2024-01-04 RX ADMIN — HYDROMORPHONE HYDROCHLORIDE: 10 INJECTION, SOLUTION INTRAMUSCULAR; INTRAVENOUS; SUBCUTANEOUS at 16:14

## 2024-01-04 RX ADMIN — FENTANYL CITRATE 25 MCG: 50 INJECTION, SOLUTION INTRAMUSCULAR; INTRAVENOUS at 09:35

## 2024-01-04 RX ADMIN — SODIUM CHLORIDE, POTASSIUM CHLORIDE, SODIUM LACTATE AND CALCIUM CHLORIDE: 600; 310; 30; 20 INJECTION, SOLUTION INTRAVENOUS at 09:37

## 2024-01-04 RX ADMIN — ALBUMIN HUMAN 500 ML: 50 SOLUTION INTRAVENOUS at 10:02

## 2024-01-04 RX ADMIN — PROTAMINE SULFATE 30 MG: 10 INJECTION, SOLUTION INTRAVENOUS at 12:06

## 2024-01-04 RX ADMIN — ROCURONIUM BROMIDE 50 MG: 10 INJECTION, SOLUTION INTRAVENOUS at 07:46

## 2024-01-04 RX ADMIN — POLYETHYLENE GLYCOL 3350 17 G: 17 POWDER, FOR SOLUTION ORAL at 16:40

## 2024-01-04 RX ADMIN — LIDOCAINE HYDROCHLORIDE 60 MG: 20 INJECTION, SOLUTION INTRAVENOUS at 07:46

## 2024-01-04 RX ADMIN — ONDANSETRON 4 MG: 2 INJECTION INTRAMUSCULAR; INTRAVENOUS at 12:42

## 2024-01-04 RX ADMIN — Medication 5 MG: at 11:08

## 2024-01-04 RX ADMIN — SODIUM CHLORIDE, POTASSIUM CHLORIDE, SODIUM LACTATE AND CALCIUM CHLORIDE: 600; 310; 30; 20 INJECTION, SOLUTION INTRAVENOUS at 06:35

## 2024-01-04 RX ADMIN — CEFAZOLIN 2000 MG: 2 INJECTION, POWDER, FOR SOLUTION INTRAMUSCULAR; INTRAVENOUS at 07:51

## 2024-01-04 RX ADMIN — POTASSIUM CHLORIDE AND SODIUM CHLORIDE: 900; 150 INJECTION, SOLUTION INTRAVENOUS at 14:58

## 2024-01-04 RX ADMIN — FENTANYL CITRATE 25 MCG: 50 INJECTION, SOLUTION INTRAMUSCULAR; INTRAVENOUS at 09:40

## 2024-01-04 RX ADMIN — ONDANSETRON 4 MG: 2 INJECTION INTRAMUSCULAR; INTRAVENOUS at 18:07

## 2024-01-04 RX ADMIN — PROPOFOL 50 MG: 10 INJECTION, EMULSION INTRAVENOUS at 08:21

## 2024-01-04 RX ADMIN — FENTANYL CITRATE 50 MCG: 50 INJECTION, SOLUTION INTRAMUSCULAR; INTRAVENOUS at 07:46

## 2024-01-04 RX ADMIN — ROCURONIUM BROMIDE 50 MG: 10 INJECTION, SOLUTION INTRAVENOUS at 09:33

## 2024-01-04 RX ADMIN — MIDAZOLAM HYDROCHLORIDE 2 MG: 1 INJECTION, SOLUTION INTRAMUSCULAR; INTRAVENOUS at 07:40

## 2024-01-04 RX ADMIN — HEPARIN SODIUM 3000 UNITS: 1000 INJECTION INTRAVENOUS; SUBCUTANEOUS at 10:40

## 2024-01-04 RX ADMIN — ROCURONIUM BROMIDE 20 MG: 10 INJECTION, SOLUTION INTRAVENOUS at 08:31

## 2024-01-04 RX ADMIN — FENTANYL CITRATE 50 MCG: 50 INJECTION, SOLUTION INTRAMUSCULAR; INTRAVENOUS at 08:21

## 2024-01-04 RX ADMIN — SODIUM CHLORIDE, POTASSIUM CHLORIDE, SODIUM LACTATE AND CALCIUM CHLORIDE: 600; 310; 30; 20 INJECTION, SOLUTION INTRAVENOUS at 06:34

## 2024-01-04 RX ADMIN — PROPOFOL 150 MG: 10 INJECTION, EMULSION INTRAVENOUS at 07:46

## 2024-01-04 RX ADMIN — ROCURONIUM BROMIDE 20 MG: 10 INJECTION, SOLUTION INTRAVENOUS at 08:44

## 2024-01-04 RX ADMIN — CEFAZOLIN 2000 MG: 2 INJECTION, POWDER, FOR SOLUTION INTRAMUSCULAR; INTRAVENOUS at 20:43

## 2024-01-04 RX ADMIN — PROPOFOL 30 MG: 10 INJECTION, EMULSION INTRAVENOUS at 09:41

## 2024-01-04 RX ADMIN — ROCURONIUM BROMIDE 10 MG: 10 INJECTION, SOLUTION INTRAVENOUS at 09:14

## 2024-01-04 RX ADMIN — FENTANYL CITRATE 25 MCG: 50 INJECTION, SOLUTION INTRAMUSCULAR; INTRAVENOUS at 09:09

## 2024-01-04 RX ADMIN — ALBUMIN HUMAN 500 ML: 50 SOLUTION INTRAVENOUS at 09:12

## 2024-01-04 RX ADMIN — HEPARIN SODIUM 2000 UNITS: 1000 INJECTION INTRAVENOUS; SUBCUTANEOUS at 11:12

## 2024-01-04 RX ADMIN — Medication 5 MG: at 08:30

## 2024-01-04 RX ADMIN — HEPARIN SODIUM 8000 UNITS: 1000 INJECTION INTRAVENOUS; SUBCUTANEOUS at 10:03

## 2024-01-04 RX ADMIN — MORPHINE SULFATE 4 MG: 4 INJECTION, SOLUTION INTRAMUSCULAR; INTRAVENOUS at 14:13

## 2024-01-04 RX ADMIN — HYDROMORPHONE HYDROCHLORIDE 1 MG: 1 INJECTION, SOLUTION INTRAMUSCULAR; INTRAVENOUS; SUBCUTANEOUS at 15:10

## 2024-01-04 RX ADMIN — FENTANYL CITRATE 25 MCG: 50 INJECTION, SOLUTION INTRAMUSCULAR; INTRAVENOUS at 08:47

## 2024-01-04 RX ADMIN — SERTRALINE 50 MG: 50 TABLET, FILM COATED ORAL at 16:40

## 2024-01-04 RX ADMIN — PROPOFOL 30 MG: 10 INJECTION, EMULSION INTRAVENOUS at 13:45

## 2024-01-04 RX ADMIN — PROPOFOL 20 MG: 10 INJECTION, EMULSION INTRAVENOUS at 09:49

## 2024-01-04 RX ADMIN — SODIUM CHLORIDE, POTASSIUM CHLORIDE, SODIUM LACTATE AND CALCIUM CHLORIDE: 600; 310; 30; 20 INJECTION, SOLUTION INTRAVENOUS at 08:30

## 2024-01-04 RX ADMIN — FENTANYL CITRATE 25 MCG: 50 INJECTION, SOLUTION INTRAMUSCULAR; INTRAVENOUS at 13:42

## 2024-01-04 RX ADMIN — ROCURONIUM BROMIDE 50 MG: 10 INJECTION, SOLUTION INTRAVENOUS at 11:27

## 2024-01-04 RX ADMIN — GABAPENTIN 400 MG: 400 CAPSULE ORAL at 20:43

## 2024-01-04 ASSESSMENT — PAIN DESCRIPTION - PAIN TYPE
TYPE: ACUTE PAIN;SURGICAL PAIN

## 2024-01-04 ASSESSMENT — PAIN SCALES - GENERAL
PAINLEVEL_OUTOF10: 7
PAINLEVEL_OUTOF10: 6
PAINLEVEL_OUTOF10: 7
PAINLEVEL_OUTOF10: 9
PAINLEVEL_OUTOF10: 5
PAINLEVEL_OUTOF10: 5
PAINLEVEL_OUTOF10: 7
PAINLEVEL_OUTOF10: 9
PAINLEVEL_OUTOF10: 7
PAINLEVEL_OUTOF10: 9

## 2024-01-04 ASSESSMENT — PAIN DESCRIPTION - LOCATION
LOCATION: FOOT;GROIN;LEG
LOCATION: FOOT;LEG;GROIN
LOCATION: GROIN;FOOT;LEG

## 2024-01-04 ASSESSMENT — PAIN DESCRIPTION - ORIENTATION
ORIENTATION: LEFT

## 2024-01-04 ASSESSMENT — PAIN - FUNCTIONAL ASSESSMENT: PAIN_FUNCTIONAL_ASSESSMENT: 0-10

## 2024-01-04 ASSESSMENT — ENCOUNTER SYMPTOMS
GASTROINTESTINAL NEGATIVE: 1
RESPIRATORY NEGATIVE: 1

## 2024-01-04 ASSESSMENT — PAIN DESCRIPTION - FREQUENCY: FREQUENCY: CONTINUOUS

## 2024-01-04 ASSESSMENT — PAIN DESCRIPTION - DESCRIPTORS: DESCRIPTORS: ACHING;BURNING

## 2024-01-04 ASSESSMENT — PAIN DESCRIPTION - ONSET: ONSET: ON-GOING

## 2024-01-04 NOTE — CARE COORDINATION
Case Management Assessment  Initial Evaluation    Date/Time of Evaluation: 1/4/2024 2:39 PM  Assessment Completed by: ROSA Mendieta    If patient is discharged prior to next notation, then this note serves as note for discharge by case management.    Patient Name: Garry Gamez                   YOB: 1960  Diagnosis: Embolism and thrombosis of left femoral vein (HCC) [I82.412]  Left leg claudication (HCC) [I73.9]  Rest pain of left lower extremity concurrent with and due to atherosclerosis of bypass graft (HCC) [I70.322]                   Date / Time: 1/4/2024  5:50 AM    Patient Admission Status: Inpatient   Readmission Risk (Low < 19, Mod (19-27), High > 27): Readmission Risk Score: 5.4    Current PCP: Lizandro Ruffin MD  PCP verified by CM? Yes    Chart Reviewed: Yes      History Provided by: Significant Other (GF)  Patient Orientation: Other (see comment) (IN PAIN)    Patient Cognition: Alert    Hospitalization in the last 30 days (Readmission):  No    If yes, Readmission Assessment in  Navigator will be completed.    Advance Directives:      Code Status: Full Code   Patient's Primary Decision Maker is: Patient Declined (Legal Next of Kin Remains as Decision Maker)      Discharge Planning:    Patient lives with:   Type of Home:    Primary Care Giver: Self  Patient Support Systems include: Spouse/Significant Other   Current Financial resources: Medicaid  Current community resources: None  Current services prior to admission:              Current DME:              Type of Home Care services:       ADLS  Prior functional level: Independent in ADLs/IADLs  Current functional level: Other (see comment) (PEND PT.OT)    PT AM-PAC:   /24  OT AM-PAC:   /24    Family can provide assistance at DC: Yes  Would you like Case Management to discuss the discharge plan with any other family members/significant others, and if so, who? Yes (GF)  Plans to Return to Present Housing: Unknown at present  Other

## 2024-01-04 NOTE — OP NOTE
Operative Note      Patient: Garry Gamez  YOB: 1960  MRN: [id number]    Date of Procedure: 1/4/2024    Pre-Op diagnosis:  1.  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  Left superficial femoral and popliteal artery occlusion    Post-Op Diagnosis: Same    Procedure:  1.  Left lower extremity angiogram  2.  Left femoral to below-knee popliteal bypass with 7 mm ringed propatent graft  3.  Prevena wound VAC placement    Surgeon(s):  Naomi Gallardo MD    Assistant:   First Assistant: Geni Clemente    Anesthesia: General    Estimated Blood Loss (mL): less than 100     Complications: None    Specimens:   * No specimens in log *    Implants:  Implant Name Type Inv. Item Serial No.  Lot No. LRB No. Used Action   GRAFT VASC L80CM DIA7MM RNG L60CM EPTFE THN WALLED CBAS HEP - [id number] Vascular grafts GRAFT VASC L80CM DIA7MM RNG L60CM EPTFE THN WALLED CBAS HEP [id number]  [employer]  Left 1 Implanted         Drains: * No LDAs found *    Findings: Left superficial femoral and above-knee popliteal artery occlusion    Indication for procedure:  Patient is a 63-year-old male with history of left foot rest pain.  He was found to have a left superficial femoral and popliteal artery occlusion.  The risk and benefits of left femoral to below-knee popliteal bypass graft were discussed with the patient, and the patient agreed to consent to procedure.  Risks including but not limited to bleeding, infection, pain, limb loss, MI, and death were discussed with the patient.    Detailed Description of Procedure:   After informed consent was obtained from the patient, the patient was taken to the operative room and prepped and draped in usual sterile fashion and placed in the supine position.    Initially incision made over the left common femoral artery.  This was taken down through the aubcutaneous tissue electrocautery.  The common femoral

## 2024-01-04 NOTE — ANESTHESIA PROCEDURE NOTES
Arterial Line:    An arterial line was placed using surface landmarks, in the pre-op for the following indication(s): continuous blood pressure monitoring and blood sampling needed.    A 20 gauge (size), 1 and 3/4 inch (length), Arrow (type) catheter was placed, Seldinger technique used, into the right radial artery, secured by tape and Tegaderm.  Anesthesia type: Local  Local infiltration: Injection    Events:  patient tolerated procedure well with no complications.1/4/2024 7:11 AM1/4/2024 7:14 AM  Anesthesiologist: Ramses Burgos DO  Performed: Anesthesiologist   Preanesthetic Checklist  Completed: patient identified, IV checked, site marked, risks and benefits discussed, surgical/procedural consents, equipment checked, pre-op evaluation, timeout performed, anesthesia consent given, oxygen available and monitors applied/VS acknowledged

## 2024-01-04 NOTE — H&P
alcoholism              Drug Use       Drug Use Status  No              Sexual Activity       Sexually Active  Not Asked                    Family History     Family History   Problem Relation Age of Onset    Heart Disease Father        Review of Systems   Review of Systems   Respiratory: Negative.     Cardiovascular: Negative.    Gastrointestinal: Negative.    All other systems reviewed and are negative.      Physical Exam   BP (!) 135/93   Pulse 78   Temp 97.6 °F (36.4 °C) (Temporal)   Resp 16   Ht 1.803 m (5' 11\")   Wt 75.8 kg (167 lb)   SpO2 95%   BMI 23.29 kg/m²     Physical Exam  Vitals reviewed.   Cardiovascular:      Rate and Rhythm: Normal rate.   Pulmonary:      Effort: Pulmonary effort is normal.   Neurological:      Mental Status: He is alert.         Labs    No results found for this or any previous visit (from the past 24 hour(s)).     Imaging/Diagnostics Last 24 Hours   No results found.    Assessment      Hospital Problems             Last Modified POA    * (Principal) Rest pain of left lower extremity concurrent with and due to atherosclerosis of bypass graft (HCC) 1/4/2024 Yes       Plan   Left femoral to popliteal bypass with saphenous vein vs ptfe graft.    Consultations Ordered:  None    Electronically signed by Naomi Gallardo MD on 1/4/24 at 7:36 AM EST

## 2024-01-04 NOTE — ANESTHESIA POSTPROCEDURE EVALUATION
Department of Anesthesiology  Postprocedure Note    Patient: Garry Gamez  MRN: [id number]  YOB: 1960  Date of evaluation: 1/4/2024    Procedure Summary       Date: 01/04/24 Room / Location: [address]    Anesthesia Start: 0740 Anesthesia Stop: 1405    Procedure: LEFT FEMORAL TO POPLITEAL BYPASS WITH VEIN VS. 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    Tony Phase I:      Tony Phase II:      Anesthesia Post Evaluation    Patient location during evaluation: ICU  Patient participation: complete - patient participated  Level of consciousness: awake  Pain score: 0  Airway patency: patent  Nausea & Vomiting: no nausea and no vomiting  Cardiovascular status: hemodynamically stable  Respiratory status: acceptable  Hydration status: euvolemic  Pain management: adequate        No notable events documented.

## 2024-01-05 LAB
ANION GAP SERPL CALCULATED.3IONS-SCNC: 8 MEQ/L (ref 9–15)
BUN SERPL-MCNC: 6 MG/DL (ref 8–23)
CALCIUM SERPL-MCNC: 8.1 MG/DL (ref 8.5–9.9)
CHLORIDE SERPL-SCNC: 104 MEQ/L (ref 95–107)
CO2 SERPL-SCNC: 24 MEQ/L (ref 20–31)
CREAT SERPL-MCNC: 0.57 MG/DL (ref 0.7–1.2)
ERYTHROCYTE [DISTWIDTH] IN BLOOD BY AUTOMATED COUNT: 15 % (ref 11.5–14.5)
GLUCOSE SERPL-MCNC: 106 MG/DL (ref 70–99)
HCT VFR BLD AUTO: 35.4 % (ref 42–52)
HGB BLD-MCNC: 11.9 G/DL (ref 14–18)
MCH RBC QN AUTO: 30.1 PG (ref 27–31.3)
MCHC RBC AUTO-ENTMCNC: 33.6 % (ref 33–37)
MCV RBC AUTO: 89.4 FL (ref 79–92.2)
PLATELET # BLD AUTO: 182 K/UL (ref 130–400)
POC ACT LR: 144 SEC
POC ACT LR: 144 SEC
POC ACT LR: 195 SEC
POC ACT LR: 244 SEC
POC ACT LR: 268 SEC
POC ACT LR: 280 SEC
POC ACT LR: 323 SEC
POTASSIUM SERPL-SCNC: 4.4 MEQ/L (ref 3.4–4.9)
RBC # BLD AUTO: 3.96 M/UL (ref 4.7–6.1)
SODIUM SERPL-SCNC: 136 MEQ/L (ref 135–144)
WBC # BLD AUTO: 13.6 K/UL (ref 4.8–10.8)

## 2024-01-05 PROCEDURE — 94150 VITAL CAPACITY TEST: CPT

## 2024-01-05 PROCEDURE — 6370000000 HC RX 637 (ALT 250 FOR IP): Performed by: SURGERY

## 2024-01-05 PROCEDURE — 6370000000 HC RX 637 (ALT 250 FOR IP): Performed by: NURSE PRACTITIONER

## 2024-01-05 PROCEDURE — 99232 SBSQ HOSP IP/OBS MODERATE 35: CPT | Performed by: INTERNAL MEDICINE

## 2024-01-05 PROCEDURE — 97162 PT EVAL MOD COMPLEX 30 MIN: CPT

## 2024-01-05 PROCEDURE — 37799 UNLISTED PX VASCULAR SURGERY: CPT

## 2024-01-05 PROCEDURE — 85027 COMPLETE CBC AUTOMATED: CPT

## 2024-01-05 PROCEDURE — 80048 BASIC METABOLIC PNL TOTAL CA: CPT

## 2024-01-05 PROCEDURE — 2700000000 HC OXYGEN THERAPY PER DAY

## 2024-01-05 PROCEDURE — 2580000003 HC RX 258: Performed by: SURGERY

## 2024-01-05 PROCEDURE — 6360000002 HC RX W HCPCS: Performed by: SURGERY

## 2024-01-05 PROCEDURE — 94761 N-INVAS EAR/PLS OXIMETRY MLT: CPT

## 2024-01-05 PROCEDURE — 2000000000 HC ICU R&B

## 2024-01-05 RX ORDER — OXYCODONE HYDROCHLORIDE AND ACETAMINOPHEN 5; 325 MG/1; MG/1
1 TABLET ORAL EVERY 4 HOURS PRN
Status: DISCONTINUED | OUTPATIENT
Start: 2024-01-05 | End: 2024-01-07 | Stop reason: HOSPADM

## 2024-01-05 RX ORDER — OXYCODONE HYDROCHLORIDE AND ACETAMINOPHEN 5; 325 MG/1; MG/1
2 TABLET ORAL EVERY 4 HOURS PRN
Status: DISCONTINUED | OUTPATIENT
Start: 2024-01-05 | End: 2024-01-07 | Stop reason: HOSPADM

## 2024-01-05 RX ORDER — NICOTINE 21 MG/24HR
1 PATCH, TRANSDERMAL 24 HOURS TRANSDERMAL DAILY
Status: DISCONTINUED | OUTPATIENT
Start: 2024-01-05 | End: 2024-01-07 | Stop reason: HOSPADM

## 2024-01-05 RX ADMIN — GABAPENTIN 400 MG: 400 CAPSULE ORAL at 20:05

## 2024-01-05 RX ADMIN — SERTRALINE 50 MG: 50 TABLET, FILM COATED ORAL at 11:13

## 2024-01-05 RX ADMIN — CEFAZOLIN 2000 MG: 2 INJECTION, POWDER, FOR SOLUTION INTRAMUSCULAR; INTRAVENOUS at 11:17

## 2024-01-05 RX ADMIN — GABAPENTIN 400 MG: 400 CAPSULE ORAL at 07:50

## 2024-01-05 RX ADMIN — ASPIRIN 81 MG: 81 TABLET, COATED ORAL at 07:50

## 2024-01-05 RX ADMIN — CEFAZOLIN 2000 MG: 2 INJECTION, POWDER, FOR SOLUTION INTRAMUSCULAR; INTRAVENOUS at 03:53

## 2024-01-05 RX ADMIN — OXYCODONE AND ACETAMINOPHEN 2 TABLET: 5; 325 TABLET ORAL at 13:14

## 2024-01-05 RX ADMIN — PANTOPRAZOLE SODIUM 40 MG: 40 TABLET, DELAYED RELEASE ORAL at 07:50

## 2024-01-05 RX ADMIN — POTASSIUM CHLORIDE AND SODIUM CHLORIDE 100 ML/HR: 900; 150 INJECTION, SOLUTION INTRAVENOUS at 00:01

## 2024-01-05 RX ADMIN — OXYCODONE AND ACETAMINOPHEN 1 TABLET: 5; 325 TABLET ORAL at 17:23

## 2024-01-05 RX ADMIN — SODIUM CHLORIDE, PRESERVATIVE FREE 10 ML: 5 INJECTION INTRAVENOUS at 07:51

## 2024-01-05 RX ADMIN — OXYCODONE AND ACETAMINOPHEN 1 TABLET: 5; 325 TABLET ORAL at 21:33

## 2024-01-05 RX ADMIN — SODIUM CHLORIDE, PRESERVATIVE FREE 10 ML: 5 INJECTION INTRAVENOUS at 20:15

## 2024-01-05 RX ADMIN — ATORVASTATIN CALCIUM 20 MG: 20 TABLET, FILM COATED ORAL at 20:05

## 2024-01-05 RX ADMIN — GABAPENTIN 400 MG: 400 CAPSULE ORAL at 13:38

## 2024-01-05 ASSESSMENT — PAIN DESCRIPTION - DESCRIPTORS
DESCRIPTORS: ACHING;BURNING
DESCRIPTORS: ACHING;SORE
DESCRIPTORS: ACHING;BURNING
DESCRIPTORS: ACHING;BURNING

## 2024-01-05 ASSESSMENT — PAIN DESCRIPTION - ORIENTATION
ORIENTATION: LEFT

## 2024-01-05 ASSESSMENT — PAIN DESCRIPTION - ONSET
ONSET: ON-GOING

## 2024-01-05 ASSESSMENT — PAIN SCALES - GENERAL
PAINLEVEL_OUTOF10: 7
PAINLEVEL_OUTOF10: 9
PAINLEVEL_OUTOF10: 1
PAINLEVEL_OUTOF10: 6
PAINLEVEL_OUTOF10: 6
PAINLEVEL_OUTOF10: 5
PAINLEVEL_OUTOF10: 6

## 2024-01-05 ASSESSMENT — PAIN DESCRIPTION - LOCATION
LOCATION: FOOT;GROIN;LEG
LOCATION: FOOT;GROIN;LEG
LOCATION: LEG;GROIN
LOCATION: FOOT;GROIN;LEG

## 2024-01-05 ASSESSMENT — PAIN DESCRIPTION - FREQUENCY
FREQUENCY: CONTINUOUS

## 2024-01-05 ASSESSMENT — PAIN DESCRIPTION - PAIN TYPE
TYPE: ACUTE PAIN;SURGICAL PAIN

## 2024-01-05 ASSESSMENT — PAIN - FUNCTIONAL ASSESSMENT: PAIN_FUNCTIONAL_ASSESSMENT: PREVENTS OR INTERFERES SOME ACTIVE ACTIVITIES AND ADLS

## 2024-01-05 NOTE — CARE COORDINATION
Quality round completed with care management team. Pt is alert and oriented. Pt lives at home with GF. Independent at baseline; Use Cane.   LSW met with pt at bedside. Discuss DC Plan with pt. Pt is open with Rehab/Rehab and HHC if need it.   Waiting for PT/OT when able.   LSW will follow.     Electronically signed by ROSA Mendieta on 1/5/2024 at 10:37 AM

## 2024-01-05 NOTE — CARE COORDINATION
AM TEAM QUALITY ICU ROUNDS. NO FAMILY PRESENT. S/P LLE ANGIOGRAM L FEMORAL BELOW KNEE POPLITEAL BYPASS W/GRAFT. PROVENA WOUND VAC. POST OP PAIN WITH DILAUDID PCA PUMP. O2 3L NC. CM/LSW TO FOLLOW FOR NEEDS. DISCHARGE PLAN IS TBD. SEE LSW NOTE. WILL FOLLOW.

## 2024-01-05 NOTE — FLOWSHEET NOTE
0815- patient eating regular breakfast    0900- patient able to stand up at side of the bed    1300- hunter out, art line out, dr Mariangel Gallardo here to see the patient. Dr. Gallardo wants patient to stay in icu tonight.    1400- patient walking in room with PT

## 2024-01-05 NOTE — PLAN OF CARE
Therapy evaluation completed.  Please see daily notes and/or progress notes for details related to planned treatment interventions, goals and functional performance.

## 2024-01-06 PROBLEM — I70.322 REST PAIN OF LEFT LOWER EXTREMITY CONCURRENT WITH AND DUE TO ATHEROSCLEROSIS OF BYPASS GRAFT (HCC): Status: RESOLVED | Noted: 2024-01-04 | Resolved: 2024-01-06

## 2024-01-06 LAB
ALBUMIN SERPL-MCNC: 3.6 G/DL (ref 3.5–4.6)
ANION GAP SERPL CALCULATED.3IONS-SCNC: 7 MEQ/L (ref 9–15)
BASOPHILS # BLD: 0.1 K/UL (ref 0–0.2)
BASOPHILS NFR BLD: 0.5 %
BUN SERPL-MCNC: 6 MG/DL (ref 8–23)
CALCIUM SERPL-MCNC: 8.8 MG/DL (ref 8.5–9.9)
CHLORIDE SERPL-SCNC: 100 MEQ/L (ref 95–107)
CO2 SERPL-SCNC: 27 MEQ/L (ref 20–31)
CREAT SERPL-MCNC: 0.64 MG/DL (ref 0.7–1.2)
EOSINOPHIL # BLD: 0.3 K/UL (ref 0–0.7)
EOSINOPHIL NFR BLD: 2.3 %
ERYTHROCYTE [DISTWIDTH] IN BLOOD BY AUTOMATED COUNT: 14.7 % (ref 11.5–14.5)
GLUCOSE SERPL-MCNC: 112 MG/DL (ref 70–99)
HCT VFR BLD AUTO: 35.3 % (ref 42–52)
HGB BLD-MCNC: 11.7 G/DL (ref 14–18)
LYMPHOCYTES # BLD: 1.5 K/UL (ref 1–4.8)
LYMPHOCYTES NFR BLD: 11.7 %
MCH RBC QN AUTO: 29.5 PG (ref 27–31.3)
MCHC RBC AUTO-ENTMCNC: 33.1 % (ref 33–37)
MCV RBC AUTO: 88.9 FL (ref 79–92.2)
MONOCYTES # BLD: 1.2 K/UL (ref 0.2–0.8)
MONOCYTES NFR BLD: 9.3 %
NEUTROPHILS # BLD: 9.8 K/UL (ref 1.4–6.5)
NEUTS SEG NFR BLD: 75.9 %
PHOSPHATE SERPL-MCNC: 1.7 MG/DL (ref 2.3–4.8)
PLATELET # BLD AUTO: 159 K/UL (ref 130–400)
POTASSIUM SERPL-SCNC: 4.1 MEQ/L (ref 3.4–4.9)
RBC # BLD AUTO: 3.97 M/UL (ref 4.7–6.1)
SODIUM SERPL-SCNC: 134 MEQ/L (ref 135–144)
WBC # BLD AUTO: 12.9 K/UL (ref 4.8–10.8)

## 2024-01-06 PROCEDURE — 97116 GAIT TRAINING THERAPY: CPT

## 2024-01-06 PROCEDURE — 80069 RENAL FUNCTION PANEL: CPT

## 2024-01-06 PROCEDURE — 6370000000 HC RX 637 (ALT 250 FOR IP): Performed by: SURGERY

## 2024-01-06 PROCEDURE — 99232 SBSQ HOSP IP/OBS MODERATE 35: CPT | Performed by: INTERNAL MEDICINE

## 2024-01-06 PROCEDURE — 6370000000 HC RX 637 (ALT 250 FOR IP): Performed by: NURSE PRACTITIONER

## 2024-01-06 PROCEDURE — 1210000000 HC MED SURG R&B

## 2024-01-06 PROCEDURE — 85025 COMPLETE CBC W/AUTO DIFF WBC: CPT

## 2024-01-06 RX ORDER — DOCUSATE SODIUM 100 MG/1
100 CAPSULE, LIQUID FILLED ORAL 2 TIMES DAILY
Qty: 60 CAPSULE | Refills: 0 | Status: SHIPPED | OUTPATIENT
Start: 2024-01-06 | End: 2024-02-05

## 2024-01-06 RX ORDER — OXYCODONE HYDROCHLORIDE AND ACETAMINOPHEN 5; 325 MG/1; MG/1
1 TABLET ORAL EVERY 4 HOURS PRN
Qty: 40 TABLET | Refills: 0 | Status: SHIPPED | OUTPATIENT
Start: 2024-01-06 | End: 2024-01-13

## 2024-01-06 RX ORDER — ATORVASTATIN CALCIUM 20 MG/1
20 TABLET, FILM COATED ORAL
Qty: 30 TABLET | Refills: 3 | Status: CANCELLED | OUTPATIENT
Start: 2024-01-06

## 2024-01-06 RX ORDER — ASPIRIN 81 MG/1
81 TABLET ORAL DAILY
Qty: 30 TABLET | Refills: 3 | Status: CANCELLED | OUTPATIENT
Start: 2024-01-07

## 2024-01-06 RX ORDER — SENNOSIDES A AND B 8.6 MG/1
1 TABLET, FILM COATED ORAL 2 TIMES DAILY
Qty: 60 TABLET | Refills: 11 | Status: SHIPPED | OUTPATIENT
Start: 2024-01-06 | End: 2025-01-05

## 2024-01-06 RX ADMIN — GABAPENTIN 400 MG: 400 CAPSULE ORAL at 15:47

## 2024-01-06 RX ADMIN — ASPIRIN 81 MG: 81 TABLET, COATED ORAL at 08:35

## 2024-01-06 RX ADMIN — ATORVASTATIN CALCIUM 20 MG: 20 TABLET, FILM COATED ORAL at 21:39

## 2024-01-06 RX ADMIN — GABAPENTIN 400 MG: 400 CAPSULE ORAL at 21:39

## 2024-01-06 RX ADMIN — GABAPENTIN 400 MG: 400 CAPSULE ORAL at 08:35

## 2024-01-06 RX ADMIN — OXYCODONE AND ACETAMINOPHEN 1 TABLET: 5; 325 TABLET ORAL at 08:35

## 2024-01-06 RX ADMIN — SERTRALINE 50 MG: 50 TABLET, FILM COATED ORAL at 08:35

## 2024-01-06 RX ADMIN — OXYCODONE AND ACETAMINOPHEN 2 TABLET: 5; 325 TABLET ORAL at 16:40

## 2024-01-06 RX ADMIN — PANTOPRAZOLE SODIUM 40 MG: 40 TABLET, DELAYED RELEASE ORAL at 05:26

## 2024-01-06 RX ADMIN — POLYETHYLENE GLYCOL 3350 17 G: 17 POWDER, FOR SOLUTION ORAL at 08:35

## 2024-01-06 ASSESSMENT — PAIN DESCRIPTION - ORIENTATION: ORIENTATION: LEFT

## 2024-01-06 ASSESSMENT — PAIN DESCRIPTION - LOCATION
LOCATION: GROIN;FOOT
LOCATION: GROIN

## 2024-01-06 ASSESSMENT — PAIN SCALES - GENERAL
PAINLEVEL_OUTOF10: 7
PAINLEVEL_OUTOF10: 6

## 2024-01-06 NOTE — DISCHARGE INSTR - ACTIVITY
Left groin provena to remain until postop visit.  No showers or baths until this is removed    No lifting > 10# for 4-6 weeks

## 2024-01-06 NOTE — CARE COORDINATION
SPOKE TO PT TO DISCUSS DC NEED PT WANTS [facility] WILL NEED ORDER AND SET UP IN AM BEFORE THIS PT CAN DC HOME P.S. TO DR SALEH FOR [facility] FOR SN TO MONITOR PROVENA WOUND VAC AND PT SERVICE . CALLED AND SPOKE TO JAIRO BARILLAS TO UPDATE THIS INFORMATION

## 2024-01-06 NOTE — DISCHARGE INSTR - DIET

## 2024-01-06 NOTE — DISCHARGE INSTRUCTIONS
Follow up with PCP in 1 week  Follow up wound clinic in 2 weeks 1/17  Ok to take dressing off calf 1/11.    Ok sponge bath.  Do not get dressings wet.  No heavy lifting >5 lbs for 6 wks.  No pushing pulling straining.

## 2024-01-06 NOTE — CONSULTS
Pulmonary and Critical Care Medicine  Consult Note  Encounter Date: 2024 4:19 PM    Mr. Garry Gamez is a 63 y.o. male  : 1960  Requesting Provider: Naomi Gallardo MD    Reason for request: ICU management            HISTORY OF PRESENT ILLNESS:    Patient is 63 y.o. presents with post femoral-popliteal bypass done today, he complains of pain at the surgery site, no abdominal pain, no nausea no vomiting, no fever.  He is on 2 L O2 saturation 97% , No leg cramps, no burning or tingling sensation.            Past Medical History:        Diagnosis Date    Alcoholism (HCC)     Anxiety     Hyperlipidemia     Hypertension        Past Surgical History:        Procedure Laterality Date    TONSILLECTOMY         Social History:     reports that he has been smoking cigarettes. He has a 20.0 pack-year smoking history. He has never been exposed to tobacco smoke. He has never used smokeless tobacco. He reports that he does not currently use alcohol. He reports that he does not use drugs.    Family History:       Problem Relation Age of Onset    Heart Disease Father        Allergies:  Patient has no known allergies.        MEDICATIONS during current hospitalization:    Continuous Infusions:   sodium chloride      0.9% NaCl with KCl 20 mEq 100 mL/hr at 24 1458    HYDROmorphone         Scheduled Meds:   [Held by provider] losartan  25 mg Oral Daily    [START ON 2024] pantoprazole  40 mg Oral QAM AC    sertraline  50 mg Oral Daily    gabapentin  400 mg Oral TID    [START ON 2024] atorvastatin  20 mg Oral QHS    [START ON 2024] aspirin  81 mg Oral Daily    sodium chloride flush  5-40 mL IntraVENous 2 times per day    polyethylene glycol  17 g Oral Daily    ceFAZolin  2,000 mg IntraVENous Q8H       PRN Meds:sodium chloride flush, sodium chloride, potassium chloride **OR** potassium alternative oral replacement **OR** potassium chloride, magnesium sulfate, ondansetron **OR** ondansetron, hydrALAZINE, 
CONTRAST:No results found for this or any previous visit.       WITHOUT CONTRAST: No results found for this or any previous visit.      CXR      2-view: No results found for this or any previous visit.       Portable: No results found for this or any previous visit.      Echo No results found for this or any previous visit.        Assessment, plan:   This is a critically ill patient at risk of deterioration / death , needing close ICU monitoring and intervention due to below noted problems   Peripheral artery disease, status post left femoral-popliteal bypass  Hypertension  Smoker  Possible sleep disorder    Recommendation  Vascular checks per protocol  Dilaudid 1 mg x 1  Continue as needed morphine for pain control  Hydralazine as needed for systolic greater than 160  IV fluids per surgeon  Watch urine output and volume status  Monitor electrolyte, fluid management   Monitor blood sugar target 140-180  DVT prophylaxis  PUD prophylaxis  Will need a pulmonary workup as outpatient     Thank you for consultation    Electronically signed by SHERIF Beard CNP on 1/4/2024 at 3:06 PM   
extremity concurrent with and due to atherosclerosis of bypass graft (HCC)  Active Problems:    * No active hospital problems. *      Patient Active Problem List   Diagnosis    Substance induced mood disorder (HCC)    Anxiety state    Atherosclerosis of native artery of left lower extremity with intermittent claudication (HCC)    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)    GERD (gastroesophageal reflux disease)           Recommendations:    Considering all of the factors above including the patient's current medical status, social status/home environment, their functional needs, and their ability to participate in a therapy program, I feel that they would best be served at:    [facility]  Await OT eval    Specialized nursing care to focus on:  Bowel and bladder issues-Monitor for urinary retention-check PVRs, bladder scan--cath if no void.  Wound risk and management   -pressure relief protocols-side to side turns  IVF medication administration      Monitor endurance and if necessary spread therapy out over a 7-day window-adding scheduled rest breaks when needed.  Focus on energy conservation.  Monitor heart rate and   cardiac medications effects on heart rate and blood pressure before, during and after therapy.  Progress toward endurance training with pulse ox monitoring for saturation and heart rate.    continue to monitor closely for dehydration-- Improve hydration and nutrition by adding Vitamin B12 shot times one, adding Protein supplements and push PO fluids.    Treat and monitor for higher level cognitive deficits, focus on difficulty with sequencing and problem-solving.    Focus on higher-level balance and falls risk issues focusing on balance training and monitoring for orthostasis.      Above recommendations are indicated to address medical complexity and need for appropriate rehab services.  Will tailor individual care and rehab plan per individuals needs re  .    Focus of today's plan-

## 2024-01-06 NOTE — FLOWSHEET NOTE
1/6/24 1700: Nurse began reviewing discharge paperwork after clearance from doctor. Case management then informed us that the patient needs set up with home health and will be unable to leave tonight.    1/7/24 1100: RN discussed with  and confirmed home health is set up for this patient. Doctor confirmed patient is okay to leave still. RN completed discharge paperwork.     1/7/24 1115: Nurse reviewed discharge paperwork with patient. Patient states they have no further questions regarding discharge plan. IV and heart monitor removed. Transport called.Electronically signed by Vaishnavi Valverde RN on 1/7/2024 at 11:06 AM

## 2024-01-06 NOTE — CARE COORDINATION
[facility] Pre-Admission Screening Document      Patient Name: Garry Gamez       MRN: [id number]    : 1960    Age: 63 y.o.  Gender: male   Payor: Payor: [employer] [place] / Plan: [employer] [place] / Product Type: *No Product type* /   MSSP: No    Admitted from: [facility] Floor: ICU  Attending Care Provider: Naomi Gallardo MD  Inpatient Rehab Referring Care Provider: Naomi Gallardo MD   Primary Care Provider: Lizandro Ruffin MD  Inpatient Treatment Team including Consults: Treatment Team: Attending Provider: Naomi Gallardo MD; Surgeon: Naomi Gallardo MD; : Amelia Pratt, RN; Consulting Physician: Kiki Garcia MD; : Ariane Bertrand RN; Utilization Reviewer: Greg Nance RN; Consulting Physician: Karen King DO; Registered Nurse: Megha Alas RN; Respiratory Therapist (Day): Juany Torres RCP; Registered Nurse: Nidhi Colin RN; : Kulwant Clifton RN; Utilization Reviewer: Jessica Cruz RN    Reason for Hospitalization: No diagnosis found.  No chief complaint on file.    Isolation:No active isolations    Hospital Course:  Admit Date: 2024  5:50 AM  Inpatient Rehab Referral Date: 24  Narrative of hospital course/history of present illness: 63 y.o. male with embolism and thrombosis of left femoral vein and left leg claudication. Patient underwent a left femoral popliteal bypass with vein vs. Synthetic bypass graph on 24.    Medical & Surgical History/Current Comorbidities:  Past Medical History:   Diagnosis Date    Alcoholism (HCC)     Anxiety     Hyperlipidemia     Hypertension      Past Surgical History:   Procedure Laterality Date    FEMORAL BYPASS Left 2024    LEFT FEMORAL TO POPLITEAL BYPASS WITH VEIN VS. SYNTHETIC BYPASS GRAPH  T&S ON ARRIVAL AND ALL OTHER LABS & H&P IN SYSTEM  C-ARM performed by Naomi Gallardo MD at [facility] OR

## 2024-01-06 NOTE — DISCHARGE SUMMARY
Discharge Summary    Date: 1/6/2024  Patient Name: Garry Gamez    YOB: 1960     Age: 63 y.o.    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 *      Hospital Stay  Narrative of Hospital Course:  Patient was admitted the day of surgery and admitted to ICU immediately post op  He remained hemodynamically stable. Extubated post op.  Post op day #3 stable Home meds resumed. Bedside instructions given     Consultants:  IP CONSULT TO CRITICAL CARE  IP CONSULT TO PHYSICAL MEDICINE REHAB    Surgeries/procedures Performed:  Left femoral to below knee pop bypass  Provena vac applied     Treatments:            Discharge Plan/Disposition:  Home    Hospital/Incidental Findings Requiring Follow Up:    Patient Instructions:    Diet:    Activity:Activity as Tolerated, No Lifting, Driving or Strenuous Excercise and No Driving for 3 Weeks  For number of days (if applicable): 4      Other Instructions: Follow up with wound clinic in 2 weeks    Provider Follow-Up:   No follow-ups on file.     Significant Diagnostic Studies:    Recent Labs:  Admission on 01/04/2024  ABO/Rh                                        Date: 01/04/2024  Value: O POS         Status: Final  Antibody Screen                               Date: 01/04/2024  Value: NEG           Status: Final  WBC                                           Date: 01/04/2024  Value: 14.6 (H)    Ref range: 4.8 - 10.8 K/uL    Status: Final  RBC                                           Date: 01/04/2024  Value:

## 2024-01-06 NOTE — CARE COORDINATION
Spoke to patient regarding Inpatient Rehab Facility (IRF) referral. Discussed IRF mission, goals, daily process, approximate length of stay, 3 hours of intensive therapy per day, discharge planning with goal of home with Home Health Care, and the need for his insurance to approve admission to IRFAlso discussed how well he is doing with physical therapy. He quickly stated, \"Yes, I walked at least 40 feet with no assistance.\" Provided brochure on Inpatient Rehab. Asked patient if he wanted to consider IRF when discharged from medical and he stated, \"No, I want to go home with Home Health Care. My girlfriend is at home and can help take care of me.\" Electronically signed by Kulwant Clifton RN on 1/6/24 at 10:47 AM EST

## 2024-01-07 VITALS
HEIGHT: 71 IN | RESPIRATION RATE: 18 BRPM | TEMPERATURE: 97.7 F | DIASTOLIC BLOOD PRESSURE: 80 MMHG | BODY MASS INDEX: 24.81 KG/M2 | WEIGHT: 177.25 LBS | SYSTOLIC BLOOD PRESSURE: 126 MMHG | OXYGEN SATURATION: 95 % | HEART RATE: 61 BPM

## 2024-01-07 LAB
ALBUMIN SERPL-MCNC: 3.5 G/DL (ref 3.5–4.6)
ANION GAP SERPL CALCULATED.3IONS-SCNC: 10 MEQ/L (ref 9–15)
BASOPHILS # BLD: 0.1 K/UL (ref 0–0.2)
BASOPHILS NFR BLD: 0.6 %
BUN SERPL-MCNC: 8 MG/DL (ref 8–23)
CALCIUM SERPL-MCNC: 8.7 MG/DL (ref 8.5–9.9)
CHLORIDE SERPL-SCNC: 102 MEQ/L (ref 95–107)
CO2 SERPL-SCNC: 26 MEQ/L (ref 20–31)
CREAT SERPL-MCNC: 0.69 MG/DL (ref 0.7–1.2)
EOSINOPHIL # BLD: 0.5 K/UL (ref 0–0.7)
EOSINOPHIL NFR BLD: 3.9 %
ERYTHROCYTE [DISTWIDTH] IN BLOOD BY AUTOMATED COUNT: 14.4 % (ref 11.5–14.5)
GLUCOSE SERPL-MCNC: 99 MG/DL (ref 70–99)
HCT VFR BLD AUTO: 34.9 % (ref 42–52)
HGB BLD-MCNC: 11.8 G/DL (ref 14–18)
LYMPHOCYTES # BLD: 1.6 K/UL (ref 1–4.8)
LYMPHOCYTES NFR BLD: 14.2 %
MCH RBC QN AUTO: 29.5 PG (ref 27–31.3)
MCHC RBC AUTO-ENTMCNC: 33.8 % (ref 33–37)
MCV RBC AUTO: 87.3 FL (ref 79–92.2)
MONOCYTES # BLD: 1.2 K/UL (ref 0.2–0.8)
MONOCYTES NFR BLD: 10.7 %
NEUTROPHILS # BLD: 8 K/UL (ref 1.4–6.5)
NEUTS SEG NFR BLD: 69.9 %
PHOSPHATE SERPL-MCNC: 2.7 MG/DL (ref 2.3–4.8)
PLATELET # BLD AUTO: 169 K/UL (ref 130–400)
POTASSIUM SERPL-SCNC: 4 MEQ/L (ref 3.4–4.9)
RBC # BLD AUTO: 4 M/UL (ref 4.7–6.1)
SODIUM SERPL-SCNC: 138 MEQ/L (ref 135–144)
WBC # BLD AUTO: 11.4 K/UL (ref 4.8–10.8)

## 2024-01-07 PROCEDURE — 36415 COLL VENOUS BLD VENIPUNCTURE: CPT

## 2024-01-07 PROCEDURE — 99232 SBSQ HOSP IP/OBS MODERATE 35: CPT | Performed by: INTERNAL MEDICINE

## 2024-01-07 PROCEDURE — 6370000000 HC RX 637 (ALT 250 FOR IP): Performed by: NURSE PRACTITIONER

## 2024-01-07 PROCEDURE — 85025 COMPLETE CBC W/AUTO DIFF WBC: CPT

## 2024-01-07 PROCEDURE — 6370000000 HC RX 637 (ALT 250 FOR IP): Performed by: SURGERY

## 2024-01-07 PROCEDURE — 80069 RENAL FUNCTION PANEL: CPT

## 2024-01-07 RX ADMIN — OXYCODONE AND ACETAMINOPHEN 1 TABLET: 5; 325 TABLET ORAL at 14:23

## 2024-01-07 RX ADMIN — ASPIRIN 81 MG: 81 TABLET, COATED ORAL at 09:07

## 2024-01-07 RX ADMIN — OXYCODONE AND ACETAMINOPHEN 1 TABLET: 5; 325 TABLET ORAL at 09:08

## 2024-01-07 RX ADMIN — SERTRALINE 50 MG: 50 TABLET, FILM COATED ORAL at 09:07

## 2024-01-07 RX ADMIN — POLYETHYLENE GLYCOL 3350 17 G: 17 POWDER, FOR SOLUTION ORAL at 09:07

## 2024-01-07 RX ADMIN — PANTOPRAZOLE SODIUM 40 MG: 40 TABLET, DELAYED RELEASE ORAL at 07:31

## 2024-01-07 RX ADMIN — GABAPENTIN 400 MG: 400 CAPSULE ORAL at 09:07

## 2024-01-07 ASSESSMENT — PAIN DESCRIPTION - LOCATION
LOCATION: LEG
LOCATION: LEG

## 2024-01-07 ASSESSMENT — PAIN DESCRIPTION - ORIENTATION: ORIENTATION: LEFT

## 2024-01-07 ASSESSMENT — PAIN SCALES - GENERAL
PAINLEVEL_OUTOF10: 8
PAINLEVEL_OUTOF10: 7

## 2024-01-07 NOTE — DISCHARGE INSTR - COC
Assessment:  Last Vital Signs: /80   Pulse 61   Temp 97.7 °F (36.5 °C) (Oral)   Resp 18   Ht 1.803 m (5' 11\")   Wt 80.4 kg (177 lb 4 oz)   SpO2 95%   BMI 24.72 kg/m²     Last documented pain score (0-10 scale): Pain Level: 7  Last Weight:   Wt Readings from Last 1 Encounters:   01/05/24 80.4 kg (177 lb 4 oz)     Mental Status:  oriented and alert    IV Access:  - None    Nursing Mobility/ADLs:  Walking   Independent  Transfer  Independent  Bathing  Independent  Dressing  Independent  Toileting  Independent  Feeding  Independent  Med Admin  Assisted  Med Delivery   whole    Wound Care Documentation and Therapy:  Negative Pressure Wound Therapy Leg Left;Upper;Anterior (Active)   Wound Type Surgical 01/07/24 0900   Unit Type prevena 01/07/24 0900   Target Pressure (mmHg) 125 01/06/24 0820   Canister changed? No 01/06/24 0820   Dressing Status Clean, dry & intact 01/07/24 0900   Drainage Amount None 01/06/24 0820   Number of days: 3       Incision 01/04/24 Femoral Left (Active)   Dressing Status Clean;Dry;Intact 01/07/24 0900   Dressing Change Due 01/18/24 01/06/24 0820   Incision Cleansed Not Cleansed 01/06/24 0820   Dressing/Treatment Other (comment) 01/06/24 0820   Drainage Amount None (dry) 01/06/24 0820   Number of days: 3       Incision 01/04/24 Pretibial Left;Inner (Active)   Dressing Status Other (Comment) 01/07/24 0900   Incision Cleansed Not Cleansed 01/06/24 0820   Dressing/Treatment Other (comment) 01/06/24 0820   Incision Assessment Dry 01/07/24 0900   Drainage Amount None (dry) 01/07/24 0900   Number of days: 3        Elimination:  Continence:   Bowel: Yes  Bladder: Yes  Urinary Catheter: None   Colostomy/Ileostomy/Ileal Conduit: No       Date of Last BM: 1/7/24    Intake/Output Summary (Last 24 hours) at 1/7/2024 1100  Last data filed at 1/6/2024 1125  Gross per 24 hour   Intake --   Output 500 ml   Net -500 ml     I/O last 3 completed shifts:  In: 480 [P.O.:480]  Out: 2075

## 2024-01-07 NOTE — CARE COORDINATION
MESSAGE LEFT KOBI RUIZ AT [facility] RE REFERRAL AWAITING RETURN CALL    1000:  SPOKE TO JOSEPH @[facility] PT ACCEPTED UPDATED BEDSIDE RN JAIRO PT WILL DC TODAY

## 2024-01-08 NOTE — PROGRESS NOTES
1900: Assumed care of pt at RN shift change after receiving bedside report from RN Julissa and RN Jessica  Pt AO4, no acute distress noted at this time, side rails up x 2, call light within reach, and white board updated. Pt with no immediate questions or concerns at this time. Pt on PCA pump with continuous ETCO2 monitoring.   2030: pt shift assessment completed. Pt reports pain remains 6-7/1-. Pulses palpable and strong 2+ BLE.   2200: pt plugs found to wound vac, attached and plugged into wall.   0000: reassessment completed, no changes to pt at this time   0200: no changes at this time. Pt in bed, eyes closed, easily arouses to voice. Pt remains AO4. Dressings C/D/I. Pulses strong and palpable. PCA pump remains on and infusing per ordered rate.Pt ETCO2 > 30   0400: reassessment completed, no changes noted.   0600:  0700: bedside report given to RN    
Approx. 1400, pt. Up to ICU room 4 post-op. Handoff received from surgery team at bedside. Pt. Recovered with Jessica ANDERSON RN. Skin assessment completed. Clean, dry, intact. Wound vac in place to left groin. Aquacell dressing to left leg.   Pt. Drowsy waking up from anesthesia but follows commands and answering some questions. Pt. C/o pain in left leg, moaning and grimacing. PRN morphine given. Pulses palpable BLE.   ~1440 Dr. Gallardo at bedside. Updated him on pt. Status including pain and medicating with PRN morphine, and that BP still elevated with SBP in 160's. Pt. States he did take his BP meds this am.  ~1500 Jessica NP at bedside to assess patient. Pt. Stating to her he is still in 9/10-10/10 pain. Dose of dilaudid ordered.  Pt. Still c/o severe pain even after dilaudid given. Pt.'s significant other at bedside and stating that patient looks uncomfortable. Notified Jesisca NP and Dilaudid PCA pump ordered. Primed dilaudid tubing with Jessica ANDERSON RN.  Educated pt. And significant other on use of PCA pump including how to use it, limits, monitoring, and that only patient can push button and encouraged patient to use it when he is in pain and needs it. ETCO2 monitor in place.   Pt. Later resting more comfortably. Pt. Asleep but wakes easily to voice. States his pain is better. Vitals stable and BP normotensive.   Handoff report given at bedside.  
INPATIENT PROGRESS NOTES    PATIENT NAME: Garry Gamez  MRN: [id number]  SERVICE DATE:  January 7, 2024   SERVICE TIME:  12:02 PM      PRIMARY SERVICE: Pulmonary Disease    CHIEF COMPLAINTS: Possible MELBA    INTERVAL HPI: Patient seen and examined at bedside, Interval Notes, orders reviewed. Nursing notes noted        New information updated in the note today, rest of the examination did not change compared to yesterday.  Patient report no issues, pain is controlled, no shortness of breath, no chest pain and no coughing.    Review of system:     GI Abdominal pain No  Skin Rash No    Social History     Tobacco Use    Smoking status: Every Day     Current packs/day: 1.00     Average packs/day: 1 pack/day for 20.0 years (20.0 ttl pk-yrs)     Types: Cigarettes     Passive exposure: Never    Smokeless tobacco: Never   Substance Use Topics    Alcohol use: Not Currently     Comment: pt has not had a drink in 6 months/history of alcoholism         Problem Relation Age of Onset    Heart Disease Father          OBJECTIVE    Body mass index is 24.72 kg/m².    PHYSICAL EXAM:  Vitals:  /80   Pulse 61   Temp 97.7 °F (36.5 °C) (Oral)   Resp 18   Ht 1.803 m (5' 11\")   Wt 80.4 kg (177 lb 4 oz)   SpO2 95%   BMI 24.72 kg/m²     General: alert, cooperative, no distress  Head: normocephalic, atraumatic  Eyes:No gross abnormalities.  ENT:  MMM no lesions  Neck:  supple and no masses  Chest : clear to auscultation bilaterally- no wheezes, rales or rhonchi, normal air movement, no respiratory distress  Heart:: Heart sounds are normal.  Regular rate and rhythm without murmur, gallop or rub.  ABD:  symmetric, soft, non-tender  Musculoskeletal : no cyanosis, no clubbing, and no edema  Neuro:  Grossly normal  Skin: No rashes or nodules noted.  Lymph node:  no cervical nodes  Urology: No Harvey   Psychiatric: appropriate    DATA:   Recent Labs     01/06/24  0422 01/07/24  0604   WBC 12.9* 11.4*   HGB 11.7* 11.8*   HCT 35.3* 34.9* 
Physical Therapy  Facility/Department: [facility] MED SURG W193/W193-01  Physical Therapy Discharge      NAME: Garry Gamez    : 1960 (63 y.o.)  MRN: [id number]    Account: [id number]  Gender: male      Patient has been discharged from acute care hospital. DC patient from current PT program.      Electronically signed by Jennifer Kirby PT on 24 at 8:31 AM EST  
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 SYNTHETIC BYPASS GRAPH  T&S ON ARRIVAL AND ALL OTHER LABS & H&P IN SYSTEM  C-ARM performed by Naomi Gallardo MD at [facility] [place]    TONSILLECTOMY              Restrictions:  Restrictions/Precautions: Fall Risk    SUBJECTIVE:   Subjective: I want to go home!    Pain  Pain: 0/10 pain reported \"but it does hurt at times.\"    OBJECTIVE:        Bed mobility  Rolling to Left: Modified independent  Rolling to Right: Modified independent  Supine to Sit: Modified independent  Sit to Supine: Modified independent    Transfers  Sit to Stand: Modified independent  Stand to Sit: Modified independent  Bed to Chair: Modified independent    Ambulation  WB Status: WBAT  Ambulation  Surface: Level tile  Device: Rolling Walker;No Device  Assistance: Modified Independent  Quality of Gait: Antalgic gait.  Able to walk with or without AD: 
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    Patient Active Problem List    Diagnosis Date Noted    Rest pain of left lower extremity concurrent with and due to atherosclerosis of bypass graft (HCC) 2024    Atherosclerosis of native artery of left lower extremity with intermittent claudication (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 SYNTHETIC BYPASS GRAPH  T&S ON ARRIVAL AND ALL OTHER LABS & H&P IN SYSTEM  C-ARM performed by Naomi Gallardo MD at [facility] [place]    TONSILLECTOMY         Chart Reviewed: Yes  Patient assessed for rehabilitation services?: Yes  Family / Caregiver Present: No    Restrictions:  Restrictions/Precautions: Fall Risk     SUBJECTIVE:   Subjective: \"It's good to see you again\"    Pain  0/10 laying, 8-9/10 WBing    Prior Level of Function:  Social/Functional History  Lives With: Significant other  Type of Home: House  Home Layout: Multi-level, Bed/Bath upstairs (7-8 steps to upstairs)  Home Access: Stairs to enter without rails  Entrance Stairs - Number of Steps: 1  Bathroom Shower/Tub: Tub/Shower unit  Bathroom Equipment: Grab bars 
Post Operative Progress Note      [facility]   MEDICAL RECORD NUMBER:  [id number]  AGE: 63 y.o.   GENDER: male  : 1960        Assessment      No events overnight. Anxious to get home. States he walked the halls and did steps with PT and did finr. Worried over payment for [facility]        Patient is post- op #4  for   Left femoral to below-knee popliteal bypass with 7 mm ringed propatent graft    Prevena wound VAC placement      Subjective   Interval History Status: improved.     Post- op day #4  Patient is alert and oriented x3    Review of Systems   Negative except for pain which is intermittent    Labs     Recent Labs     24  0403 24  0422 24  0604   WBC 13.6* 12.9* 11.4*   HGB 11.9* 11.7* 11.8*   HCT 35.4* 35.3* 34.9*    159 169     Recent Labs     24  0404 24  0422 24  0604    134* 138   K 4.4 4.1 4.0    100 102   CO2 24 27 26   BUN 6* 6* 8   CREATININE 0.57* 0.64* 0.69*   CALCIUM 8.1* 8.8 8.7   PHOS  --  1.7* 2.7     No results for input(s): \"AST\", \"ALT\", \"BILIDIR\", \"BILITOT\", \"ALKPHOS\" in the last 72 hours.  No results for input(s): \"INR\" in the last 72 hours.  No results for input(s): \"CKTOTAL\", \"TROPONINI\" in the last 72 hours.    Physical Examination      Vitals:  /80   Pulse 61   Temp 97.7 °F (36.5 °C) (Oral)   Resp 18   Ht 1.803 m (5' 11\")   Wt 80.4 kg (177 lb 4 oz)   SpO2 95%   BMI 24.72 kg/m²   Temp (24hrs), Av.7 °F (36.5 °C), Min:97.7 °F (36.5 °C), Max:97.7 °F (36.5 °C)        Neuro intact  RRR S1S2  Lungs clear  Bsx4 tolerated diet  VQS  Provena vac intact to left groin. No swelling or noted hematoma  Distal dressing removed. Incision well intact. No erythema  Palpable PT and DP    Plan     1.  Patient seen when rehab nurse present He has done well with PT and requests to go home.  He lives with his \"girlfriend\"   2. Resume home meds  3. Discharge home today  Follow up in wound clinic in 2 weeks  Follow up with PCP 
Post Operative Progress Note      NAME: Garry Gamez   MEDICAL RECORD NUMBER:  [id number]  AGE: 63 y.o.   GENDER: male  : 1960        Assessment        Hospital Problems             Last Modified POA    * (Principal) Rest pain of left lower extremity concurrent with and due to atherosclerosis of bypass graft (HCC) 2024 Yes       Patient is post- op for   Left femoral to below-knee popliteal bypass with 7 mm ringed propatent graft    Prevena wound VAC placement      Subjective   Interval History Status: improved.     Post- op day #3  Patient is alert and oriented x3    Review of Systems   Negative except for pain which is intermittent    Labs     Recent Labs     24  1448 24  0403 24  0422   WBC 14.6* 13.6* 12.9*   HGB 12.5* 11.9* 11.7*   HCT 37.6* 35.4* 35.3*    182 159     Recent Labs     24  1447 24  1737 24  0404 24  0422     --  136 134*   K 4.4 3.8 4.4 4.1     --  104 100   CO2 23  --  24 27   BUN 7*  --  6* 6*   CREATININE 0.58*  --  0.57* 0.64*   CALCIUM 8.2*  --  8.1* 8.8   PHOS  --   --   --  1.7*     No results for input(s): \"AST\", \"ALT\", \"BILIDIR\", \"BILITOT\", \"ALKPHOS\" in the last 72 hours.  No results for input(s): \"INR\" in the last 72 hours.  No results for input(s): \"CKTOTAL\", \"TROPONINI\" in the last 72 hours.    Physical Examination      Vitals:  BP (!) 160/88   Pulse 71   Temp 98.1 °F (36.7 °C) (Oral)   Resp 14   Ht 1.803 m (5' 11\")   Wt 80.4 kg (177 lb 4 oz)   SpO2 96%   BMI 24.72 kg/m²   Temp (24hrs), Av °F (37.2 °C), Min:97.8 °F (36.6 °C), Max:99.5 °F (37.5 °C)        Neuro intact  RRR S1S2  Lungs clear  Bsx4 tolerated diet  VQS  Provena vac intact to left groin. No swelling or noted hematoma  Distal dressing removed. Incision well intact. No erythema  Palpable PT and DP    Plan     1.  Patient seen when rehab nurse present He has done well with PT and requests to go home.  He lives with his \"girlfriend\"   2. 
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  Rest pain has resolved.    Review of Systems:   Review of Systems  Afebrile    Physical Examination:    /80   Pulse 62   Temp 99.5 °F (37.5 °C)   Resp 12   Ht 1.803 m (5' 11\")   Wt 80.4 kg (177 lb 4 oz)   SpO2 95%   BMI 24.72 kg/m²    Physical Exam  Cardiovascular regular rate and rhythm   lungs clear to auscultation bilaterally  Left groin soft no hematoma noted  Left calf incision soft no hematoma noted  2+ posterior tibial pulse on the left    LABS:  CBC:   Lab Results   Component Value Date/Time    WBC 13.6 01/05/2024 04:03 AM    RBC 3.96 01/05/2024 04:03 AM    HGB 11.9 01/05/2024 04:03 AM    HCT 35.4 01/05/2024 04:03 AM    MCV 89.4 01/05/2024 04:03 AM    MCH 30.1 01/05/2024 04:03 AM    MCHC 33.6 01/05/2024 04:03 AM    RDW 15.0 01/05/2024 04:03 AM     01/05/2024 04:03 AM     CBC with Differential:   Lab Results   Component Value Date/Time    WBC 13.6 01/05/2024 04:03 AM    RBC 3.96 01/05/2024 04:03 AM    HGB 11.9 01/05/2024 04:03 AM    HCT 35.4 01/05/2024 04:03 AM     01/05/2024 04:03 AM    MCV 89.4 01/05/2024 04:03 AM    MCH 30.1 01/05/2024 04:03 AM    MCHC 33.6 01/05/2024 04:03 AM    RDW 15.0 01/05/2024 04:03 AM    LYMPHOPCT 18.4 12/14/2023 06:00 AM    MONOPCT 7.8 12/14/2023 06:00 AM    BASOPCT 0.7 12/14/2023 06:00 AM    MONOSABS 0.9 12/14/2023 06:00 AM    LYMPHSABS 2.2 12/14/2023 06:00 AM    EOSABS 0.4 12/14/2023 06:00 AM    BASOSABS 0.1 12/14/2023 06:00 AM     CMP:    Lab Results   Component Value Date/Time    
Pt awake and repositioned in bed for breakfast. Wound vac remains at left groin. Dressing at left calf area dry and intact pulses palpable.   
Pulmonary & Critical Care Medicine ICU Progress Note  Chief complaint : Left leg pain    Subjunctive/24 hour events :   Patient seen and examined during multidisciplinary rounds with RN, charge nurse, RT, pharmacy, dietitian, and social service.   Patient had an uneventful night.  Pain is more under control with PCA Dilaudid.  Left foot warm pulses per Doppler wound VAC intact.  Patient is on 3 L nasal cannula O2 sats are 92%.  No fever overnight and urine output 3150 for 24 hours.  Patient is tolerating a p.o. diet, last BM on 1/04/2024      Social History     Tobacco Use    Smoking status: Every Day     Current packs/day: 1.00     Average packs/day: 1 pack/day for 20.0 years (20.0 ttl pk-yrs)     Types: Cigarettes     Passive exposure: Never    Smokeless tobacco: Never   Substance Use Topics    Alcohol use: Not Currently     Comment: pt has not had a drink in 6 months/history of alcoholism         Problem Relation Age of Onset    Heart Disease Father        No results for input(s): \"PHART\", \"VDS9VFX\", \"PO2ART\" in the last 72 hours.    MV Settings:     / / /            IV:   sodium chloride      HYDROmorphone         Vitals:  /60   Pulse 66   Temp 99.5 °F (37.5 °C)   Resp 17   Ht 1.803 m (5' 11\")   Wt 80.4 kg (177 lb 4 oz)   SpO2 96%   BMI 24.72 kg/m²    Tmax:       Intake/Output Summary (Last 24 hours) at 1/5/2024 0825  Last data filed at 1/5/2024 0400  Gross per 24 hour   Intake 4052.84 ml   Output 3200 ml   Net 852.84 ml       EXAM:    General: alert, cooperative  Head: normocephalic, atraumatic  Eyes:No gross abnormalities.  ENT:  MMM no lesions  Neck:  supple and no masses  Chest : Fair air movement no wheezing no rales nontender tympanic  Heart:: Heart sounds are normal.  Regular rate and rhythm without murmur, gallop or rub.  ABD:  bowel sounds normal, soft, non-tender  Musculoskeletal : no cyanosis, no clubbing, and no edema  Neuro:  Grossly normal  Skin: No rashes or nodules noted.  Lymph 
Report called to !WT.  
Shift assessments completed and documented, see flowsheets. A&OX4, c/o 6/10 pain after ambulating approximately 40ft in room with walker for q-shift ambulation goal. Medications administered per MAR, including percocet for pain (pt requested 1 pill instead of the 2 allowed per the pain rating score parameters, as he states 2 pills made him nauseous). Femoral site CDI, pulses on bilateral legs present and moderate-to-strong per flowsheets. Call light within reach. No further needs verbalized at this time by pt.  
noted.  Lymph node:  no cervical nodes  Urology: No Harvey   Psychiatric: appropriate    Medications:  Scheduled Meds:   nicotine  1 patch TransDERmal Daily    [Held by provider] losartan  25 mg Oral Daily    pantoprazole  40 mg Oral QAM AC    sertraline  50 mg Oral Daily    gabapentin  400 mg Oral TID    atorvastatin  20 mg Oral QHS    aspirin  81 mg Oral Daily    sodium chloride flush  5-40 mL IntraVENous 2 times per day    polyethylene glycol  17 g Oral Daily       PRN Meds:  oxyCODONE-acetaminophen, oxyCODONE-acetaminophen **OR** oxyCODONE-acetaminophen, sodium chloride flush, sodium chloride, potassium chloride **OR** potassium alternative oral replacement **OR** potassium chloride, magnesium sulfate, ondansetron **OR** ondansetron, hydrALAZINE    Results: reviewed by me   CBC:   Recent Labs     01/04/24  1448 01/05/24  0403 01/06/24  0422   WBC 14.6* 13.6* 12.9*   HGB 12.5* 11.9* 11.7*   HCT 37.6* 35.4* 35.3*   MCV 89.3 89.4 88.9    182 159       BMP:   Recent Labs     01/04/24  1447 01/04/24  1737 01/05/24  0404 01/06/24  0422     --  136 134*   K 4.4 3.8 4.4 4.1     --  104 100   CO2 23  --  24 27   PHOS  --   --   --  1.7*   BUN 7*  --  6* 6*   CREATININE 0.58*  --  0.57* 0.64*       LIVER PROFILE: No results for input(s): \"AST\", \"ALT\", \"LIPASE\", \"AMYLASE\", \"ALB\", \"BILIDIR\", \"BILITOT\", \"ALKPHOS\" in the last 72 hours.  PT/INR: No results for input(s): \"PROTIME\", \"INR\" in the last 72 hours.  APTT: No results for input(s): \"APTT\" in the last 72 hours.  UA:No results for input(s): \"NITRITE\", \"COLORU\", \"PHUR\", \"LABCAST\", \"WBCUA\", \"RBCUA\", \"MUCUS\", \"TRICHOMONAS\", \"YEAST\", \"BACTERIA\", \"CLARITYU\", \"SPECGRAV\", \"LEUKOCYTESUR\", \"UROBILINOGEN\", \"BILIRUBINUR\", \"BLOODU\", \"GLUCOSEU\", \"AMORPHOUS\" in the last 72 hours.    Invalid input(s): \"KETONESU\"    Cultures:    No results found.  Most recent    Chest CT      WITH CONTRAST:No results found for this or any previous visit.       WITHOUT CONTRAST: No

## 2024-01-16 ENCOUNTER — OFFICE VISIT (OUTPATIENT)
Dept: PRIMARY CARE CLINIC | Age: 64
End: 2024-01-16
Payer: COMMERCIAL

## 2024-01-16 VITALS
WEIGHT: 170.4 LBS | HEART RATE: 61 BPM | RESPIRATION RATE: 18 BRPM | SYSTOLIC BLOOD PRESSURE: 100 MMHG | DIASTOLIC BLOOD PRESSURE: 60 MMHG | OXYGEN SATURATION: 98 % | HEIGHT: 71 IN | BODY MASS INDEX: 23.85 KG/M2

## 2024-01-16 DIAGNOSIS — Z09 HOSPITAL DISCHARGE FOLLOW-UP: Primary | ICD-10-CM

## 2024-01-16 DIAGNOSIS — I70.212 ATHEROSCLEROSIS OF NATIVE ARTERY OF LEFT LOWER EXTREMITY WITH INTERMITTENT CLAUDICATION (HCC): ICD-10-CM

## 2024-01-16 PROCEDURE — 1111F DSCHRG MED/CURRENT MED MERGE: CPT | Performed by: STUDENT IN AN ORGANIZED HEALTH CARE EDUCATION/TRAINING PROGRAM

## 2024-01-16 PROCEDURE — 99214 OFFICE O/P EST MOD 30 MIN: CPT | Performed by: STUDENT IN AN ORGANIZED HEALTH CARE EDUCATION/TRAINING PROGRAM

## 2024-01-16 ASSESSMENT — PATIENT HEALTH QUESTIONNAIRE - PHQ9
1. LITTLE INTEREST OR PLEASURE IN DOING THINGS: 3
SUM OF ALL RESPONSES TO PHQ QUESTIONS 1-9: 11
3. TROUBLE FALLING OR STAYING ASLEEP: 0
SUM OF ALL RESPONSES TO PHQ QUESTIONS 1-9: 11
10. IF YOU CHECKED OFF ANY PROBLEMS, HOW DIFFICULT HAVE THESE PROBLEMS MADE IT FOR YOU TO DO YOUR WORK, TAKE CARE OF THINGS AT HOME, OR GET ALONG WITH OTHER PEOPLE: 0
5. POOR APPETITE OR OVEREATING: 0
6. FEELING BAD ABOUT YOURSELF - OR THAT YOU ARE A FAILURE OR HAVE LET YOURSELF OR YOUR FAMILY DOWN: 0
7. TROUBLE CONCENTRATING ON THINGS, SUCH AS READING THE NEWSPAPER OR WATCHING TELEVISION: 2
SUM OF ALL RESPONSES TO PHQ QUESTIONS 1-9: 11
4. FEELING TIRED OR HAVING LITTLE ENERGY: 3
SUM OF ALL RESPONSES TO PHQ QUESTIONS 1-9: 11
8. MOVING OR SPEAKING SO SLOWLY THAT OTHER PEOPLE COULD HAVE NOTICED. OR THE OPPOSITE, BEING SO FIGETY OR RESTLESS THAT YOU HAVE BEEN MOVING AROUND A LOT MORE THAN USUAL: 0
9. THOUGHTS THAT YOU WOULD BE BETTER OFF DEAD, OR OF HURTING YOURSELF: 0
2. FEELING DOWN, DEPRESSED OR HOPELESS: 3
SUM OF ALL RESPONSES TO PHQ9 QUESTIONS 1 & 2: 6

## 2024-01-16 NOTE — PROGRESS NOTES
Post-Discharge Transitional Care  Follow Up      Garry Gamez   YOB: 1960    Date of Office Visit:  1/16/2024  Date of Hospital Admission: 1/4/24  Date of Hospital Discharge: 1/7/24  Risk of hospital readmission (high >=14%. Medium >=10%) :Readmission Risk Score: 10.7      Care management risk score Rising risk (score 2-5) and Complex Care (Scores >=6): No Risk Score On File     Non face to face  following discharge, date last encounter closed (first attempt may have been earlier): *No documented post hospital discharge outreach found in the last 14 days    Call initiated 2 business days of discharge: *No response recorded in the last 14 days    ASSESSMENT/PLAN:   Below is the assessment and plan developed based on review of pertinent history, physical exam, labs, studies, and medications.  Hospital discharge follow-up  -     OH DISCHARGE MEDS RECONCILED W/ CURRENT OUTPATIENT MED LIST    Medical Decision Making: straightforward  Return if symptoms worsen or fail to improve.           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 Extubated post op.  Post op day #3 stable Home meds resumed.\"  They recommended follow-up with wound clinic in the next few days.  No lifting, driving, or strenuous activity for 3 weeks.    Interval history/Current status: He is doing well today, has pain postsurgery but is doing better.  Denies any difficulty with bowel movements.    Patient Active Problem List   Diagnosis    Substance induced mood disorder (HCC)    Anxiety state    Atherosclerosis of native artery of left lower

## 2024-01-17 ENCOUNTER — HOSPITAL ENCOUNTER (OUTPATIENT)
Dept: WOUND CARE | Age: 64
Discharge: HOME | End: 2024-01-17
Payer: COMMERCIAL

## 2024-01-17 VITALS
SYSTOLIC BLOOD PRESSURE: 152 MMHG | DIASTOLIC BLOOD PRESSURE: 88 MMHG | TEMPERATURE: 96.6 F | HEART RATE: 73 BPM | RESPIRATION RATE: 16 BRPM

## 2024-01-17 DIAGNOSIS — L97.221 CALF ULCER, LEFT, LIMITED TO BREAKDOWN OF SKIN (HCC): Primary | ICD-10-CM

## 2024-01-17 PROCEDURE — 99212 OFFICE O/P EST SF 10 MIN: CPT

## 2024-01-17 PROCEDURE — 99024 POSTOP FOLLOW-UP VISIT: CPT | Performed by: SURGERY

## 2024-01-17 RX ORDER — CLOBETASOL PROPIONATE 0.5 MG/G
OINTMENT TOPICAL ONCE
OUTPATIENT
Start: 2024-01-17 | End: 2024-01-17

## 2024-01-17 RX ORDER — SODIUM CHLOR/HYPOCHLOROUS ACID 0.033 %
SOLUTION, IRRIGATION IRRIGATION ONCE
OUTPATIENT
Start: 2024-01-17 | End: 2024-01-17

## 2024-01-17 RX ORDER — BACITRACIN ZINC AND POLYMYXIN B SULFATE 500; 1000 [USP'U]/G; [USP'U]/G
OINTMENT TOPICAL ONCE
OUTPATIENT
Start: 2024-01-17 | End: 2024-01-17

## 2024-01-17 RX ORDER — LIDOCAINE HYDROCHLORIDE 20 MG/ML
JELLY TOPICAL ONCE
OUTPATIENT
Start: 2024-01-17 | End: 2024-01-17

## 2024-01-17 RX ORDER — GENTAMICIN SULFATE 1 MG/G
OINTMENT TOPICAL ONCE
OUTPATIENT
Start: 2024-01-17 | End: 2024-01-17

## 2024-01-17 RX ORDER — LIDOCAINE HYDROCHLORIDE 40 MG/ML
SOLUTION TOPICAL ONCE
OUTPATIENT
Start: 2024-01-17 | End: 2024-01-17

## 2024-01-17 RX ORDER — BETAMETHASONE DIPROPIONATE 0.5 MG/G
CREAM TOPICAL ONCE
OUTPATIENT
Start: 2024-01-17 | End: 2024-01-17

## 2024-01-17 RX ORDER — IBUPROFEN 200 MG
TABLET ORAL ONCE
OUTPATIENT
Start: 2024-01-17 | End: 2024-01-17

## 2024-01-17 RX ORDER — GINSENG 100 MG
CAPSULE ORAL ONCE
OUTPATIENT
Start: 2024-01-17 | End: 2024-01-17

## 2024-01-17 RX ORDER — LIDOCAINE 40 MG/G
CREAM TOPICAL ONCE
OUTPATIENT
Start: 2024-01-17 | End: 2024-01-17

## 2024-01-17 RX ORDER — LIDOCAINE 50 MG/G
OINTMENT TOPICAL ONCE
OUTPATIENT
Start: 2024-01-17 | End: 2024-01-17

## 2024-01-17 RX ORDER — TRIAMCINOLONE ACETONIDE 1 MG/G
OINTMENT TOPICAL ONCE
OUTPATIENT
Start: 2024-01-17 | End: 2024-01-17

## 2024-01-17 NOTE — PROGRESS NOTES
[facility]                                                   Progress Note and Procedure Note      Garry Gamez  MEDICAL RECORD NUMBER:  [id number]  AGE: 63 y.o.   GENDER: male  : 1960  EPISODE DATE:  2024    Subjective:     Chief Complaint   Patient presents with    Wound Check         HISTORY of PRESENT ILLNESS HPI     Garry Gamez is a 63 y.o. male who presents today for wound/ulcer evaluation.   History of Wound Context: Patient is status post left femoral to popliteal bypass on 2024.  Patient is doing well.  Patient denies any rest pain.  Patient does have some left foot swelling which is normal postoperative edema.    Wound/Ulcer Pain Timing/Severity: none  Quality of pain: N/A  Severity:  0 / 10   Modifying Factors: None  Associated Signs/Symptoms: none    Ulcer Identification:  Ulcer Type: non-healing surgical  Contributing Factors: smoking    Wound: N/A        PAST MEDICAL HISTORY        Diagnosis Date    Alcoholism (HCC)     Anxiety     Hyperlipidemia     Hypertension        PAST SURGICAL HISTORY    Past Surgical History:   Procedure Laterality Date    FEMORAL BYPASS Left 2024    LEFT FEMORAL TO POPLITEAL BYPASS WITH VEIN VS. SYNTHETIC BYPASS GRAPH  T&S ON ARRIVAL AND ALL OTHER LABS & H&P IN SYSTEM  C-ARM performed by Naomi Gallardo MD at [facility] [place]    TONSILLECTOMY         FAMILY HISTORY    Family History   Problem Relation Age of Onset    Heart Disease Father        SOCIAL HISTORY    Social History     Tobacco Use    Smoking status: Every Day     Current packs/day: 1.00     Average packs/day: 1 pack/day for 20.0 years (20.0 ttl pk-yrs)     Types: Cigarettes     Passive exposure: Never    Smokeless tobacco: Never   Vaping Use    Vaping Use: Never used   Substance Use Topics    Alcohol use: Not Currently     Comment: pt has not had a drink in 6 months/history of alcoholism    Drug use: No       ALLERGIES    No Known Allergies    MEDICATIONS    Current Outpatient

## 2024-01-17 NOTE — DISCHARGE INSTRUCTIONS
[facility]   Physician Orders and Discharge Instructions  [address] [place]  [address]  Telephone: [phone]      [phone]      NAME:  Garry Gamez          YOB: 1960  MEDICAL RECORD NUMBER:  [id number]    Your  is:  [facility]/Facility: [facility]    Wound Location: Left Lower Leg    Dressing orders:   1.Cleanse wound(s) with normal saline.  2. Apply dry HYDROFERA BLUE READY FOAM or equivalent to wound bed.  NOTE: If your HYDROFRERA BLUE is not soft and pliable, moisten with saline until it is sponge like and then ring out excess saline and apply to wound bed.  3. Cover with 4x4's and wrap with gauze (florence or kerlix)  4. Change  Every other day or Monday, Wednesday, and Friday     Wound Location: Left Groin   Place a 4x4 gauze to absorb drainage, if any     Compression: None    Offloading Device: None    Other Instructions:     Keep all dressings clean, dry and intact.  Keep pressure off the wound(s) at all times.     Follow up visit   1 Weeks: January 24, 2024 @ 9:45 am    Please give 24 hour notice if unable to keep appointment. [phone]    If you experience any of the following, please call the Wound Care Service at  [phone] or go to the nearest emergency room.   *Increase in pain *Temperature over 101 *Increase in drainage from your wound or a foul odor  *Uncontrolled swelling *Need for compression bandage changes due to slippage, breakthrough drainage       PLEASE NOTE: IF YOU ARE UNABLE TO OBTAIN WOUND SUPPLIES, CONTINUE TO USE THE SUPPLIES YOU HAVE AVAILABLE UNTIL YOU ARE ABLE TO REACH US. IT IS MOST IMPORTANT TO KEEP THE WOUND COVERED AT ALL TIMES      Electronically signed by Naomi Gallardo MD on 1/17/2024 at 11:28 AM

## 2024-01-24 ENCOUNTER — HOSPITAL ENCOUNTER (OUTPATIENT)
Dept: WOUND CARE | Age: 64
Discharge: HOME | End: 2024-01-24
Payer: COMMERCIAL

## 2024-01-24 VITALS
RESPIRATION RATE: 18 BRPM | HEART RATE: 68 BPM | SYSTOLIC BLOOD PRESSURE: 126 MMHG | DIASTOLIC BLOOD PRESSURE: 86 MMHG | TEMPERATURE: 97.1 F

## 2024-01-24 PROCEDURE — 11042 DBRDMT SUBQ TIS 1ST 20SQCM/<: CPT | Performed by: SURGERY

## 2024-01-24 PROCEDURE — 11042 DBRDMT SUBQ TIS 1ST 20SQCM/<: CPT

## 2024-01-24 RX ORDER — TRIAMCINOLONE ACETONIDE 1 MG/G
OINTMENT TOPICAL ONCE
OUTPATIENT
Start: 2024-01-24 | End: 2024-01-24

## 2024-01-24 RX ORDER — LIDOCAINE HYDROCHLORIDE 40 MG/ML
SOLUTION TOPICAL ONCE
OUTPATIENT
Start: 2024-01-24 | End: 2024-01-24

## 2024-01-24 RX ORDER — IBUPROFEN 200 MG
TABLET ORAL ONCE
OUTPATIENT
Start: 2024-01-24 | End: 2024-01-24

## 2024-01-24 RX ORDER — BETAMETHASONE DIPROPIONATE 0.5 MG/G
CREAM TOPICAL ONCE
OUTPATIENT
Start: 2024-01-24 | End: 2024-01-24

## 2024-01-24 RX ORDER — SODIUM CHLOR/HYPOCHLOROUS ACID 0.033 %
SOLUTION, IRRIGATION IRRIGATION ONCE
OUTPATIENT
Start: 2024-01-24 | End: 2024-01-24

## 2024-01-24 RX ORDER — LIDOCAINE 50 MG/G
OINTMENT TOPICAL ONCE
OUTPATIENT
Start: 2024-01-24 | End: 2024-01-24

## 2024-01-24 RX ORDER — LIDOCAINE HYDROCHLORIDE 20 MG/ML
JELLY TOPICAL ONCE
OUTPATIENT
Start: 2024-01-24 | End: 2024-01-24

## 2024-01-24 RX ORDER — GENTAMICIN SULFATE 1 MG/G
OINTMENT TOPICAL ONCE
OUTPATIENT
Start: 2024-01-24 | End: 2024-01-24

## 2024-01-24 RX ORDER — LIDOCAINE 40 MG/G
CREAM TOPICAL ONCE
OUTPATIENT
Start: 2024-01-24 | End: 2024-01-24

## 2024-01-24 RX ORDER — GINSENG 100 MG
CAPSULE ORAL ONCE
OUTPATIENT
Start: 2024-01-24 | End: 2024-01-24

## 2024-01-24 RX ORDER — CLOBETASOL PROPIONATE 0.5 MG/G
OINTMENT TOPICAL ONCE
OUTPATIENT
Start: 2024-01-24 | End: 2024-01-24

## 2024-01-24 RX ORDER — BACITRACIN ZINC AND POLYMYXIN B SULFATE 500; 1000 [USP'U]/G; [USP'U]/G
OINTMENT TOPICAL ONCE
OUTPATIENT
Start: 2024-01-24 | End: 2024-01-24

## 2024-01-24 NOTE — PROGRESS NOTES
[facility]                                                   Progress Note and Procedure Note      Garry Gamez  MEDICAL RECORD NUMBER:  [id number]  AGE: 63 y.o.   GENDER: male  : 1960  EPISODE DATE:  2024    Subjective:     Chief Complaint   Patient presents with    Wound Check         HISTORY of PRESENT ILLNESS HPI     Garry Gamez is a 63 y.o. male who presents today for wound/ulcer evaluation.   History of Wound Context: Patient is status post left femoral to popliteal bypass on 2024.  Patient is doing well.  Patient denies any rest pain.  Patient does have some left foot swelling which is normal postoperative edema.    Wound/Ulcer Pain Timing/Severity: none  Quality of pain: N/A  Severity:  0 / 10   Modifying Factors: None  Associated Signs/Symptoms: none    Ulcer Identification:  Ulcer Type: non-healing surgical  Contributing Factors: smoking    Wound: N/A        PAST MEDICAL HISTORY        Diagnosis Date    Alcoholism (HCC)     Anxiety     Hyperlipidemia     Hypertension        PAST SURGICAL HISTORY    Past Surgical History:   Procedure Laterality Date    FEMORAL BYPASS Left 2024    LEFT FEMORAL TO POPLITEAL BYPASS WITH VEIN VS. SYNTHETIC BYPASS GRAPH  T&S ON ARRIVAL AND ALL OTHER LABS & H&P IN SYSTEM  C-ARM performed by Naomi Gallardo MD at [facility] [place]    TONSILLECTOMY         FAMILY HISTORY    Family History   Problem Relation Age of Onset    Heart Disease Father        SOCIAL HISTORY    Social History     Tobacco Use    Smoking status: Every Day     Current packs/day: 1.00     Average packs/day: 1 pack/day for 20.0 years (20.0 ttl pk-yrs)     Types: Cigarettes     Passive exposure: Never    Smokeless tobacco: Never   Vaping Use    Vaping Use: Never used   Substance Use Topics    Alcohol use: Not Currently     Comment: pt has not had a drink in 6 months/history of alcoholism    Drug use: No       ALLERGIES    No Known Allergies    MEDICATIONS    Current Outpatient

## 2024-01-24 NOTE — DISCHARGE INSTRUCTIONS
[facility]   Physician Orders and Discharge Instructions  [address] [place]  [address]  Telephone: [phone]      [phone]        NAME:  Garry Gamez                                                                                            YOB: 1960  MEDICAL RECORD NUMBER:  [id number]     Your  is:  [facility] Care/Facility: None     Wound Location: Left Lower Leg     Dressing orders:   1.Cleanse wound(s) with normal saline.  2. Apply dry HYDROFERA BLUE READY FOAM or equivalent to wound bed.  NOTE: If your HYDROFRERA BLUE is not soft and pliable, moisten with saline until it is sponge like and then ring out excess saline and apply to wound bed.  3. Cover with 4x4's and wrap with gauze (florence or kerlix)  4. Change  Every other day or Monday, Wednesday, and Friday         Compression: None     Offloading Device: None     Other Instructions:      Keep all dressings clean, dry and intact.  Keep pressure off the wound(s) at all times.      Follow up visit   1 Weeks: January 31, 2024 @ 9:45 am     Please give 24 hour notice if unable to keep appointment. [phone]     If you experience any of the following, please call the Wound Care Service at  [phone] or go to the nearest emergency room.        *Increase in pain         *Temperature over 101           *Increase in drainage from your wound or a foul odor  *Uncontrolled swelling            *Need for compression bandage changes due to slippage, breakthrough drainage       PLEASE NOTE: IF YOU ARE UNABLE TO OBTAIN WOUND SUPPLIES, CONTINUE TO USE THE SUPPLIES YOU HAVE AVAILABLE UNTIL YOU ARE ABLE TO REACH US. IT IS MOST IMPORTANT TO KEEP THE WOUND COVERED AT ALL TIMES.    Electronically signed by Naomi Gallardo MD on 1/24/2024 at 10:27 AM

## 2024-01-31 ENCOUNTER — HOSPITAL ENCOUNTER (OUTPATIENT)
Dept: WOUND CARE | Age: 64
Discharge: HOME | End: 2024-01-31
Payer: COMMERCIAL

## 2024-01-31 VITALS
DIASTOLIC BLOOD PRESSURE: 82 MMHG | TEMPERATURE: 97.6 F | SYSTOLIC BLOOD PRESSURE: 140 MMHG | HEART RATE: 67 BPM | RESPIRATION RATE: 18 BRPM

## 2024-01-31 PROCEDURE — 11042 DBRDMT SUBQ TIS 1ST 20SQCM/<: CPT | Performed by: SURGERY

## 2024-01-31 PROCEDURE — 11042 DBRDMT SUBQ TIS 1ST 20SQCM/<: CPT

## 2024-01-31 NOTE — PROGRESS NOTES
[facility]                                                   Progress Note and Procedure Note      Garry Gamez  MEDICAL RECORD NUMBER:  [id number]  AGE: 63 y.o.   GENDER: male  : 1960  EPISODE DATE:  2024    Subjective:     Chief Complaint   Patient presents with    Wound Check         HISTORY of PRESENT ILLNESS HPI     Garry Gamez is a 63 y.o. male who presents today for wound/ulcer evaluation.   History of Wound Context: Patient is status post left femoral to popliteal bypass on 2024.  Patient is doing well.  Patient denies any rest pain.  Patient does have some left foot swelling which is normal postoperative edema.    Wound/Ulcer Pain Timing/Severity: none  Quality of pain: N/A  Severity:  0 / 10   Modifying Factors: None  Associated Signs/Symptoms: none    Ulcer Identification:  Ulcer Type: non-healing surgical  Contributing Factors: smoking    Wound: N/A        PAST MEDICAL HISTORY        Diagnosis Date    Alcoholism (HCC)     Anxiety     Hyperlipidemia     Hypertension        PAST SURGICAL HISTORY    Past Surgical History:   Procedure Laterality Date    FEMORAL BYPASS Left 2024    LEFT FEMORAL TO POPLITEAL BYPASS WITH VEIN VS. SYNTHETIC BYPASS GRAPH  T&S ON ARRIVAL AND ALL OTHER LABS & H&P IN SYSTEM  C-ARM performed by Naomi Gallardo MD at [facility] [place]    TONSILLECTOMY         FAMILY HISTORY    Family History   Problem Relation Age of Onset    Heart Disease Father        SOCIAL HISTORY    Social History     Tobacco Use    Smoking status: Every Day     Current packs/day: 1.00     Average packs/day: 1 pack/day for 20.0 years (20.0 ttl pk-yrs)     Types: Cigarettes     Passive exposure: Never    Smokeless tobacco: Never   Vaping Use    Vaping Use: Never used   Substance Use Topics    Alcohol use: Not Currently     Comment: pt has not had a drink in 6 months/history of alcoholism    Drug use: No       ALLERGIES    No Known Allergies    MEDICATIONS    Current Outpatient

## 2024-01-31 NOTE — DISCHARGE INSTRUCTIONS
[facility]   Physician Orders and Discharge Instructions  [facility]  [address] [place]  [address]  Telephone: [phone]      [phone]        NAME:  Garry Gamez                                                                                            YOB: 1960  MEDICAL RECORD NUMBER:  [id number]     Your  is:  [facility] Care/Facility: [facility]     Wound Location: Left Lower Leg     Dressing orders:   1.Cleanse wound(s) with normal saline.  2. Apply dry HYDROFERA BLUE READY FOAM or equivalent to wound bed.  NOTE: If your HYDROFRERA BLUE is not soft and pliable, moisten with saline until it is sponge like and then ring out excess saline and apply to wound bed.  3. Cover with 4x4's and wrap with gauze (florence or kerlix)  4. Change  Every other day or Monday, Wednesday, and Friday        Compression: None     Offloading Device: None     Other Instructions:      Keep all dressings clean, dry and intact.  Keep pressure off the wound(s) at all times.      Follow up visit   1 Week: February 7, 2024 @ 9:45 am     Please give 24 hour notice if unable to keep appointment. [phone]     If you experience any of the following, please call the Wound Care Service at  [phone] or go to the nearest emergency room.        *Increase in pain         *Temperature over 101           *Increase in drainage from your wound or a foul odor  *Uncontrolled swelling            *Need for compression bandage changes due to slippage, breakthrough drainage       PLEASE NOTE: IF YOU ARE UNABLE TO OBTAIN WOUND SUPPLIES, CONTINUE TO USE THE SUPPLIES YOU HAVE AVAILABLE UNTIL YOU ARE ABLE TO REACH US. IT IS MOST IMPORTANT TO KEEP THE WOUND COVERED AT ALL TIMES     Electronically signed by Naomi Gallardo MD on 1/31/2024 at 10:51 AM

## 2024-02-07 ENCOUNTER — HOSPITAL ENCOUNTER (OUTPATIENT)
Dept: WOUND CARE | Age: 64
Discharge: HOME | End: 2024-02-07
Payer: COMMERCIAL

## 2024-02-07 VITALS
DIASTOLIC BLOOD PRESSURE: 92 MMHG | SYSTOLIC BLOOD PRESSURE: 138 MMHG | RESPIRATION RATE: 18 BRPM | HEART RATE: 71 BPM | TEMPERATURE: 97.4 F

## 2024-02-07 DIAGNOSIS — L97.221 CALF ULCER, LEFT, LIMITED TO BREAKDOWN OF SKIN (HCC): Primary | ICD-10-CM

## 2024-02-07 PROCEDURE — 11042 DBRDMT SUBQ TIS 1ST 20SQCM/<: CPT | Performed by: SURGERY

## 2024-02-07 PROCEDURE — 11042 DBRDMT SUBQ TIS 1ST 20SQCM/<: CPT

## 2024-02-07 RX ORDER — LIDOCAINE HYDROCHLORIDE 20 MG/ML
JELLY TOPICAL ONCE
OUTPATIENT
Start: 2024-02-07 | End: 2024-02-07

## 2024-02-07 RX ORDER — GINSENG 100 MG
CAPSULE ORAL ONCE
OUTPATIENT
Start: 2024-02-07 | End: 2024-02-07

## 2024-02-07 RX ORDER — BETAMETHASONE DIPROPIONATE 0.5 MG/G
CREAM TOPICAL ONCE
OUTPATIENT
Start: 2024-02-07 | End: 2024-02-07

## 2024-02-07 RX ORDER — LIDOCAINE HYDROCHLORIDE 40 MG/ML
SOLUTION TOPICAL ONCE
OUTPATIENT
Start: 2024-02-07 | End: 2024-02-07

## 2024-02-07 RX ORDER — GENTAMICIN SULFATE 1 MG/G
OINTMENT TOPICAL ONCE
OUTPATIENT
Start: 2024-02-07 | End: 2024-02-07

## 2024-02-07 RX ORDER — TRIAMCINOLONE ACETONIDE 1 MG/G
OINTMENT TOPICAL ONCE
OUTPATIENT
Start: 2024-02-07 | End: 2024-02-07

## 2024-02-07 RX ORDER — LIDOCAINE 40 MG/G
CREAM TOPICAL ONCE
OUTPATIENT
Start: 2024-02-07 | End: 2024-02-07

## 2024-02-07 RX ORDER — IBUPROFEN 200 MG
TABLET ORAL ONCE
OUTPATIENT
Start: 2024-02-07 | End: 2024-02-07

## 2024-02-07 RX ORDER — LIDOCAINE 50 MG/G
OINTMENT TOPICAL ONCE
OUTPATIENT
Start: 2024-02-07 | End: 2024-02-07

## 2024-02-07 RX ORDER — SODIUM CHLOR/HYPOCHLOROUS ACID 0.033 %
SOLUTION, IRRIGATION IRRIGATION ONCE
OUTPATIENT
Start: 2024-02-07 | End: 2024-02-07

## 2024-02-07 RX ORDER — BACITRACIN ZINC AND POLYMYXIN B SULFATE 500; 1000 [USP'U]/G; [USP'U]/G
OINTMENT TOPICAL ONCE
OUTPATIENT
Start: 2024-02-07 | End: 2024-02-07

## 2024-02-07 RX ORDER — CLOBETASOL PROPIONATE 0.5 MG/G
OINTMENT TOPICAL ONCE
OUTPATIENT
Start: 2024-02-07 | End: 2024-02-07

## 2024-02-07 NOTE — DISCHARGE INSTRUCTIONS
[facility]   Physician Orders and Discharge Instructions  [address] [place]  [address]  Telephone: [phone]      [phone]        NAME:  Garry Gamez                                                                                            YOB: 1960  MEDICAL RECORD NUMBER:  [id number]     Your  is:  [facility] Care/Facility: None     Wound Location: Left Lower Leg     Dressing orders:   1.Cleanse wound(s) with normal saline.  2. Apply dry HYDROFERA BLUE READY FOAM or equivalent to wound bed.  NOTE: If your HYDROFRERA BLUE is not soft and pliable, moisten with saline until it is sponge like and then ring out excess saline and apply to wound bed.  3. Cover with 4x4's and wrap with gauze (florence or kerlix)  4. Change  Every other day or Monday, Wednesday, and Friday         Compression: None     Offloading Device: None     Other Instructions:      Keep all dressings clean, dry and intact.  Keep pressure off the wound(s) at all times.      Follow up visit   1 Weeks: February 14, 2024 @ 10:15 am     Please give 24 hour notice if unable to keep appointment. [phone]     If you experience any of the following, please call the Wound Care Service at  [phone] or go to the nearest emergency room.        *Increase in pain         *Temperature over 101           *Increase in drainage from your wound or a foul odor  *Uncontrolled swelling            *Need for compression bandage changes due to slippage, breakthrough drainage       PLEASE NOTE: IF YOU ARE UNABLE TO OBTAIN WOUND SUPPLIES, CONTINUE TO USE THE SUPPLIES YOU HAVE AVAILABLE UNTIL YOU ARE ABLE TO REACH US. IT IS MOST IMPORTANT TO KEEP THE WOUND COVERED AT ALL TIMES    Electronically signed by Naomi Gallardo MD on 2/7/2024 at 10:24 AM

## 2024-02-07 NOTE — PROGRESS NOTES
[facility]                                                   Progress Note and Procedure Note      Garry Gamez  MEDICAL RECORD NUMBER:  [id number]  AGE: 63 y.o.   GENDER: male  : 1960  EPISODE DATE:  2024    Subjective:     Chief Complaint   Patient presents with    Wound Check         HISTORY of PRESENT ILLNESS HPI     Garry Gamez is a 63 y.o. male who presents today for wound/ulcer evaluation.   History of Wound Context: Patient is status post left femoral to popliteal bypass on 2024.  Patient is doing well.  Patient denies any rest pain.  Patient does have some left foot swelling which is normal postoperative edema.    Wound/Ulcer Pain Timing/Severity: none  Quality of pain: N/A  Severity:  0 / 10   Modifying Factors: None  Associated Signs/Symptoms: none    Ulcer Identification:  Ulcer Type: non-healing surgical  Contributing Factors: smoking    Wound: N/A        PAST MEDICAL HISTORY        Diagnosis Date    Alcoholism (HCC)     Anxiety     Hyperlipidemia     Hypertension        PAST SURGICAL HISTORY    Past Surgical History:   Procedure Laterality Date    FEMORAL BYPASS Left 2024    LEFT FEMORAL TO POPLITEAL BYPASS WITH VEIN VS. SYNTHETIC BYPASS GRAPH  T&S ON ARRIVAL AND ALL OTHER LABS & H&P IN SYSTEM  C-ARM performed by Naomi Gallardo MD at [facility] [place]    TONSILLECTOMY         FAMILY HISTORY    Family History   Problem Relation Age of Onset    Heart Disease Father        SOCIAL HISTORY    Social History     Tobacco Use    Smoking status: Every Day     Current packs/day: 1.00     Average packs/day: 1 pack/day for 20.0 years (20.0 ttl pk-yrs)     Types: Cigarettes     Passive exposure: Never    Smokeless tobacco: Never   Vaping Use    Vaping Use: Never used   Substance Use Topics    Alcohol use: Not Currently     Comment: pt has not had a drink in 6 months/history of alcoholism    Drug use: No       ALLERGIES    No Known Allergies    MEDICATIONS    Current Outpatient

## 2024-02-14 ENCOUNTER — HOSPITAL ENCOUNTER (OUTPATIENT)
Dept: WOUND CARE | Age: 64
Discharge: HOME | End: 2024-02-14
Payer: COMMERCIAL

## 2024-02-14 VITALS
DIASTOLIC BLOOD PRESSURE: 90 MMHG | HEART RATE: 70 BPM | RESPIRATION RATE: 20 BRPM | SYSTOLIC BLOOD PRESSURE: 141 MMHG | TEMPERATURE: 97.7 F

## 2024-02-14 DIAGNOSIS — I10 ESSENTIAL (PRIMARY) HYPERTENSION: ICD-10-CM

## 2024-02-14 DIAGNOSIS — L97.221 CALF ULCER, LEFT, LIMITED TO BREAKDOWN OF SKIN (HCC): Primary | ICD-10-CM

## 2024-02-14 DIAGNOSIS — I70.212 ATHEROSCLEROSIS OF NATIVE ARTERY OF LEFT LOWER EXTREMITY WITH INTERMITTENT CLAUDICATION (HCC): ICD-10-CM

## 2024-02-14 DIAGNOSIS — M48.061 SPINAL STENOSIS AT L4-L5 LEVEL: ICD-10-CM

## 2024-02-14 PROCEDURE — 11042 DBRDMT SUBQ TIS 1ST 20SQCM/<: CPT

## 2024-02-14 PROCEDURE — 11042 DBRDMT SUBQ TIS 1ST 20SQCM/<: CPT | Performed by: SURGERY

## 2024-02-14 RX ORDER — SODIUM CHLOR/HYPOCHLOROUS ACID 0.033 %
SOLUTION, IRRIGATION IRRIGATION ONCE
Status: CANCELLED | OUTPATIENT
Start: 2024-02-14 | End: 2024-02-14

## 2024-02-14 RX ORDER — LIDOCAINE HYDROCHLORIDE 20 MG/ML
JELLY TOPICAL ONCE
OUTPATIENT
Start: 2024-02-14 | End: 2024-02-14

## 2024-02-14 RX ORDER — IBUPROFEN 200 MG
TABLET ORAL ONCE
Status: CANCELLED | OUTPATIENT
Start: 2024-02-14 | End: 2024-02-14

## 2024-02-14 RX ORDER — LIDOCAINE 40 MG/G
CREAM TOPICAL ONCE
Status: CANCELLED | OUTPATIENT
Start: 2024-02-14 | End: 2024-02-14

## 2024-02-14 RX ORDER — LIDOCAINE HYDROCHLORIDE 40 MG/ML
SOLUTION TOPICAL ONCE
Status: CANCELLED | OUTPATIENT
Start: 2024-02-14 | End: 2024-02-14

## 2024-02-14 RX ORDER — LIDOCAINE HYDROCHLORIDE 40 MG/ML
SOLUTION TOPICAL ONCE
OUTPATIENT
Start: 2024-02-14 | End: 2024-02-14

## 2024-02-14 RX ORDER — GABAPENTIN 400 MG/1
400 CAPSULE ORAL 3 TIMES DAILY
Qty: 270 CAPSULE | Refills: 0 | OUTPATIENT
Start: 2024-02-14 | End: 2024-05-14

## 2024-02-14 RX ORDER — SODIUM CHLOR/HYPOCHLOROUS ACID 0.033 %
SOLUTION, IRRIGATION IRRIGATION ONCE
OUTPATIENT
Start: 2024-02-14 | End: 2024-02-14

## 2024-02-14 RX ORDER — BACITRACIN ZINC AND POLYMYXIN B SULFATE 500; 1000 [USP'U]/G; [USP'U]/G
OINTMENT TOPICAL ONCE
Status: CANCELLED | OUTPATIENT
Start: 2024-02-14 | End: 2024-02-14

## 2024-02-14 RX ORDER — LIDOCAINE HYDROCHLORIDE 20 MG/ML
JELLY TOPICAL ONCE
Status: CANCELLED | OUTPATIENT
Start: 2024-02-14 | End: 2024-02-14

## 2024-02-14 RX ORDER — LOSARTAN POTASSIUM 25 MG/1
25 TABLET ORAL DAILY
Qty: 90 TABLET | Refills: 0 | Status: SHIPPED | OUTPATIENT
Start: 2024-02-14

## 2024-02-14 RX ORDER — ATORVASTATIN CALCIUM 20 MG/1
20 TABLET, FILM COATED ORAL DAILY
Qty: 90 TABLET | Refills: 0 | Status: SHIPPED | OUTPATIENT
Start: 2024-02-14

## 2024-02-14 RX ORDER — CLOBETASOL PROPIONATE 0.5 MG/G
OINTMENT TOPICAL ONCE
OUTPATIENT
Start: 2024-02-14 | End: 2024-02-14

## 2024-02-14 RX ORDER — IBUPROFEN 200 MG
TABLET ORAL ONCE
OUTPATIENT
Start: 2024-02-14 | End: 2024-02-14

## 2024-02-14 RX ORDER — TRIAMCINOLONE ACETONIDE 1 MG/G
OINTMENT TOPICAL ONCE
OUTPATIENT
Start: 2024-02-14 | End: 2024-02-14

## 2024-02-14 RX ORDER — CLOBETASOL PROPIONATE 0.5 MG/G
OINTMENT TOPICAL ONCE
Status: CANCELLED | OUTPATIENT
Start: 2024-02-14 | End: 2024-02-14

## 2024-02-14 RX ORDER — BACITRACIN ZINC AND POLYMYXIN B SULFATE 500; 1000 [USP'U]/G; [USP'U]/G
OINTMENT TOPICAL ONCE
OUTPATIENT
Start: 2024-02-14 | End: 2024-02-14

## 2024-02-14 RX ORDER — GINSENG 100 MG
CAPSULE ORAL ONCE
OUTPATIENT
Start: 2024-02-14 | End: 2024-02-14

## 2024-02-14 RX ORDER — LIDOCAINE 50 MG/G
OINTMENT TOPICAL ONCE
OUTPATIENT
Start: 2024-02-14 | End: 2024-02-14

## 2024-02-14 RX ORDER — LIDOCAINE 40 MG/G
CREAM TOPICAL ONCE
OUTPATIENT
Start: 2024-02-14 | End: 2024-02-14

## 2024-02-14 RX ORDER — GENTAMICIN SULFATE 1 MG/G
OINTMENT TOPICAL ONCE
Status: CANCELLED | OUTPATIENT
Start: 2024-02-14 | End: 2024-02-14

## 2024-02-14 RX ORDER — GENTAMICIN SULFATE 1 MG/G
OINTMENT TOPICAL ONCE
OUTPATIENT
Start: 2024-02-14 | End: 2024-02-14

## 2024-02-14 RX ORDER — TRIAMCINOLONE ACETONIDE 1 MG/G
OINTMENT TOPICAL ONCE
Status: CANCELLED | OUTPATIENT
Start: 2024-02-14 | End: 2024-02-14

## 2024-02-14 RX ORDER — BETAMETHASONE DIPROPIONATE 0.5 MG/G
CREAM TOPICAL ONCE
OUTPATIENT
Start: 2024-02-14 | End: 2024-02-14

## 2024-02-14 RX ORDER — GINSENG 100 MG
CAPSULE ORAL ONCE
Status: CANCELLED | OUTPATIENT
Start: 2024-02-14 | End: 2024-02-14

## 2024-02-14 RX ORDER — BETAMETHASONE DIPROPIONATE 0.5 MG/G
CREAM TOPICAL ONCE
Status: CANCELLED | OUTPATIENT
Start: 2024-02-14 | End: 2024-02-14

## 2024-02-14 RX ORDER — LIDOCAINE 50 MG/G
OINTMENT TOPICAL ONCE
Status: CANCELLED | OUTPATIENT
Start: 2024-02-14 | End: 2024-02-14

## 2024-02-14 NOTE — PLAN OF CARE
Problem: Wound:  Goal: Will show signs of wound healing; wound closure and no evidence of infection  Description: Will show signs of wound healing; wound closure and no evidence of infection  2/14/2024 1020 by Deepa Liriano RN  Outcome: Progressing  2/14/2024 1016 by Kimberly Cristina RN  Outcome: Progressing

## 2024-02-14 NOTE — PROGRESS NOTES
[facility]                                                   Progress Note and Procedure Note      Garry Gamez  MEDICAL RECORD NUMBER:  [id number]  AGE: 63 y.o.   GENDER: male  : 1960  EPISODE DATE:  2024    Subjective:     Chief Complaint   Patient presents with    Wound Check         HISTORY of PRESENT ILLNESS HPI     Garry Gamez is a 63 y.o. male who presents today for wound/ulcer evaluation.   History of Wound Context: Patient is status post left femoral to popliteal bypass on 2024.  Patient is doing well.  Patient denies any rest pain.  Patient does have some left foot swelling which is normal postoperative edema.    Wound/Ulcer Pain Timing/Severity: none  Quality of pain: N/A  Severity:  0 / 10   Modifying Factors: None  Associated Signs/Symptoms: none    Ulcer Identification:  Ulcer Type: non-healing surgical  Contributing Factors: smoking    Wound: N/A        PAST MEDICAL HISTORY        Diagnosis Date    Alcoholism (HCC)     Anxiety     Hyperlipidemia     Hypertension        PAST SURGICAL HISTORY    Past Surgical History:   Procedure Laterality Date    FEMORAL BYPASS Left 2024    LEFT FEMORAL TO POPLITEAL BYPASS WITH VEIN VS. SYNTHETIC BYPASS GRAPH  T&S ON ARRIVAL AND ALL OTHER LABS & H&P IN SYSTEM  C-ARM performed by Naomi Gallardo MD at [facility] [place]    TONSILLECTOMY         FAMILY HISTORY    Family History   Problem Relation Age of Onset    Heart Disease Father        SOCIAL HISTORY    Social History     Tobacco Use    Smoking status: Every Day     Current packs/day: 1.00     Average packs/day: 1 pack/day for 20.0 years (20.0 ttl pk-yrs)     Types: Cigarettes     Passive exposure: Never    Smokeless tobacco: Never   Vaping Use    Vaping Use: Never used   Substance Use Topics    Alcohol use: Not Currently     Comment: pt has not had a drink in 6 months/history of alcoholism    Drug use: No       ALLERGIES    No Known Allergies    MEDICATIONS    Current Outpatient

## 2024-02-14 NOTE — DISCHARGE INSTRUCTIONS
[facility]   Physician Orders and Discharge Instructions  [address] [place]  [address]  Telephone: [phone]      [phone]        NAME:  Garry Gamez                                                                                            YOB: 1960  MEDICAL RECORD NUMBER:  [id number]     Your  is:  [facility] Care/Facility: None     Wound Location: Left Lower Leg     Dressing orders:   1.Cleanse wound(s) with normal saline.  2. Apply dry HYDROFERA BLUE READY FOAM or equivalent to wound bed.  NOTE: If your HYDROFRERA BLUE is not soft and pliable, moisten with saline until it is sponge like and then ring out excess saline and apply to wound bed.  3. Cover with 4x4's and wrap with gauze (florence or kerlix)  4. Change  Every other day or Monday, Wednesday, and Friday         Compression: None     Offloading Device: None     Other Instructions:      Keep all dressings clean, dry and intact.  Keep pressure off the wound(s) at all times.      Follow up visit   1 Weeks: February 21, 2024 @ 10:15 am     Please give 24 hour notice if unable to keep appointment. [phone]     If you experience any of the following, please call the Wound Care Service at  [phone] or go to the nearest emergency room.        *Increase in pain         *Temperature over 101           *Increase in drainage from your wound or a foul odor  *Uncontrolled swelling            *Need for compression bandage changes due to slippage, breakthrough drainage       PLEASE NOTE: IF YOU ARE UNABLE TO OBTAIN WOUND SUPPLIES, CONTINUE TO USE THE SUPPLIES YOU HAVE AVAILABLE UNTIL YOU ARE ABLE TO REACH US. IT IS MOST IMPORTANT TO KEEP THE WOUND COVERED AT ALL TIMES    Electronically signed by Naomi Gallardo MD on 2/14/2024 at 11:07 AM

## 2024-02-15 ENCOUNTER — OFFICE VISIT (OUTPATIENT)
Dept: PRIMARY CARE CLINIC | Age: 64
End: 2024-02-15
Payer: COMMERCIAL

## 2024-02-15 VITALS
DIASTOLIC BLOOD PRESSURE: 72 MMHG | OXYGEN SATURATION: 97 % | HEART RATE: 68 BPM | HEIGHT: 70 IN | WEIGHT: 161 LBS | SYSTOLIC BLOOD PRESSURE: 124 MMHG | BODY MASS INDEX: 23.05 KG/M2

## 2024-02-15 DIAGNOSIS — J06.9 ACUTE UPPER RESPIRATORY INFECTION, UNSPECIFIED: ICD-10-CM

## 2024-02-15 DIAGNOSIS — F33.1 MODERATE EPISODE OF RECURRENT MAJOR DEPRESSIVE DISORDER (HCC): ICD-10-CM

## 2024-02-15 DIAGNOSIS — M48.061 SPINAL STENOSIS AT L4-L5 LEVEL: Primary | ICD-10-CM

## 2024-02-15 DIAGNOSIS — H61.22 IMPACTED CERUMEN OF LEFT EAR: Primary | ICD-10-CM

## 2024-02-15 LAB
INFLUENZA A ANTIBODY: NEGATIVE
INFLUENZA B ANTIBODY: NEGATIVE
Lab: NORMAL
PERFORMING INSTRUMENT: NORMAL
QC PASS/FAIL: NORMAL
RSV ANTIGEN: NEGATIVE
SARS-COV-2, POC: NORMAL

## 2024-02-15 PROCEDURE — 87804 INFLUENZA ASSAY W/OPTIC: CPT | Performed by: STUDENT IN AN ORGANIZED HEALTH CARE EDUCATION/TRAINING PROGRAM

## 2024-02-15 PROCEDURE — 86756 RESPIRATORY VIRUS ANTIBODY: CPT | Performed by: STUDENT IN AN ORGANIZED HEALTH CARE EDUCATION/TRAINING PROGRAM

## 2024-02-15 PROCEDURE — 87426 SARSCOV CORONAVIRUS AG IA: CPT | Performed by: STUDENT IN AN ORGANIZED HEALTH CARE EDUCATION/TRAINING PROGRAM

## 2024-02-15 PROCEDURE — 99214 OFFICE O/P EST MOD 30 MIN: CPT | Performed by: STUDENT IN AN ORGANIZED HEALTH CARE EDUCATION/TRAINING PROGRAM

## 2024-02-15 RX ORDER — BENZONATATE 200 MG/1
200 CAPSULE ORAL 3 TIMES DAILY PRN
Qty: 30 CAPSULE | Refills: 0 | Status: SHIPPED | OUTPATIENT
Start: 2024-02-15 | End: 2024-02-25

## 2024-02-15 RX ORDER — SERTRALINE HYDROCHLORIDE 25 MG/1
50 TABLET, FILM COATED ORAL DAILY
Qty: 90 TABLET | Refills: 1 | OUTPATIENT
Start: 2024-02-15

## 2024-02-15 RX ORDER — ALBUTEROL SULFATE 90 UG/1
2 AEROSOL, METERED RESPIRATORY (INHALATION) EVERY 6 HOURS PRN
Qty: 18 G | Refills: 3 | Status: SHIPPED | OUTPATIENT
Start: 2024-02-15

## 2024-02-15 RX ORDER — GABAPENTIN 400 MG/1
400 CAPSULE ORAL 3 TIMES DAILY
Qty: 270 CAPSULE | Refills: 0 | Status: SHIPPED | OUTPATIENT
Start: 2024-02-15 | End: 2024-05-15

## 2024-02-15 NOTE — TELEPHONE ENCOUNTER
Requested Prescriptions     Pending Prescriptions Disp Refills    sertraline (ZOLOFT) 25 MG tablet 90 tablet 1     Sig: Take 2 tablets by mouth daily

## 2024-02-15 NOTE — PROGRESS NOTES
2/15/2024        Garry Gamez 1960 is a 63 y.o. male who presents today with:  Chief Complaint   Patient presents with    Cough    Congestion    Generalized Body Aches    Diarrhea     X5 to 6 days     Medication Refill       HPI:   Spinal stenosis L4-L5  Takes gabapentin for the pain and believes that it works well.  He would like to continue without decreasing the dose.  He denies any side effects.    For the past 5 to 6 days he has had diarrhea, nausea, vomiting, generalized body aches, cough, congestion. His son was sick and came home. Garry took him to urgent care and a couple days later he started with symptoms  Pertinent negatives: Shortness of breath, wheezing  Treatments tried: Mucinex, ibuprofen    Assessment & Plan   1. Spinal stenosis at L4-L5 level  -     gabapentin (NEURONTIN) 400 MG capsule; Take 1 capsule by mouth 3 times daily for 90 days., Disp-270 capsule, R-0Normal  -     Pain Management Drug Screen; Future  2. Acute upper respiratory infection, unspecified  -     POCT COVID-19, Antigen  -     POCT Influenza A/B  -     albuterol sulfate HFA (VENTOLIN HFA) 108 (90 Base) MCG/ACT inhaler; Inhale 2 puffs into the lungs every 6 hours as needed for Wheezing, Disp-18 g, R-3Normal  -     benzonatate (TESSALON) 200 MG capsule; Take 1 capsule by mouth 3 times daily as needed for Cough, Disp-30 capsule, R-0Normal     Medications Discontinued During This Encounter   Medication Reason    gabapentin (NEURONTIN) 400 MG capsule REORDER     Return in about 3 months (around 5/15/2024).    All testing today was negative.   PDMP reviewed and good     Objective  No Known Allergies  Current Outpatient Medications   Medication Sig Dispense Refill    gabapentin (NEURONTIN) 400 MG capsule Take 1 capsule by mouth 3 times daily for 90 days. 270 capsule 0    albuterol sulfate HFA (VENTOLIN HFA) 108 (90 Base) MCG/ACT inhaler Inhale 2 puffs into the lungs every 6 hours as needed for Wheezing 18 g 3    benzonatate

## 2024-02-15 NOTE — PATIENT INSTRUCTIONS
All testing today was negative.     It is likely you have a viral infection as 90% of upper respiratory infections are viral.  The following treatments below are recommended for your symptoms.  Please try these and reach out if your symptoms have not improved after 10 days from when they began.  -Vitamin C 1000 mg daily for 3 to 5 days  -Tylenol or ibuprofen for aches and chills and fever  -Sandee pot/saline nasal rinses/Flonase for nasal congestion, sinus pressure, nasal drainage  -Cepacol throat lozenges, Vicks vapocool, or Chloraseptic throat spray for throat pain  -Mucinex for just chest congestion, or Mucinex DM for chest congestion and cough     I would appreciate it if you could please complete survey in [employer] about your experience today and my service!

## 2024-02-21 ENCOUNTER — HOSPITAL ENCOUNTER (OUTPATIENT)
Dept: WOUND CARE | Age: 64
Discharge: HOME | End: 2024-02-21
Payer: COMMERCIAL

## 2024-02-21 VITALS
HEART RATE: 62 BPM | DIASTOLIC BLOOD PRESSURE: 97 MMHG | TEMPERATURE: 97.2 F | RESPIRATION RATE: 18 BRPM | SYSTOLIC BLOOD PRESSURE: 158 MMHG

## 2024-02-21 DIAGNOSIS — L97.221 CALF ULCER, LEFT, LIMITED TO BREAKDOWN OF SKIN (HCC): Primary | ICD-10-CM

## 2024-02-21 PROCEDURE — 99024 POSTOP FOLLOW-UP VISIT: CPT | Performed by: SURGERY

## 2024-02-21 PROCEDURE — 99212 OFFICE O/P EST SF 10 MIN: CPT

## 2024-02-21 RX ORDER — BETAMETHASONE DIPROPIONATE 0.5 MG/G
CREAM TOPICAL ONCE
OUTPATIENT
Start: 2024-02-21 | End: 2024-02-21

## 2024-02-21 RX ORDER — GENTAMICIN SULFATE 1 MG/G
OINTMENT TOPICAL ONCE
OUTPATIENT
Start: 2024-02-21 | End: 2024-02-21

## 2024-02-21 RX ORDER — CLOBETASOL PROPIONATE 0.5 MG/G
OINTMENT TOPICAL ONCE
OUTPATIENT
Start: 2024-02-21 | End: 2024-02-21

## 2024-02-21 RX ORDER — BACITRACIN ZINC AND POLYMYXIN B SULFATE 500; 1000 [USP'U]/G; [USP'U]/G
OINTMENT TOPICAL ONCE
OUTPATIENT
Start: 2024-02-21 | End: 2024-02-21

## 2024-02-21 RX ORDER — TRIAMCINOLONE ACETONIDE 1 MG/G
OINTMENT TOPICAL ONCE
OUTPATIENT
Start: 2024-02-21 | End: 2024-02-21

## 2024-02-21 RX ORDER — LIDOCAINE 50 MG/G
OINTMENT TOPICAL ONCE
OUTPATIENT
Start: 2024-02-21 | End: 2024-02-21

## 2024-02-21 RX ORDER — LIDOCAINE HYDROCHLORIDE 40 MG/ML
SOLUTION TOPICAL ONCE
OUTPATIENT
Start: 2024-02-21 | End: 2024-02-21

## 2024-02-21 RX ORDER — LIDOCAINE HYDROCHLORIDE 20 MG/ML
JELLY TOPICAL ONCE
OUTPATIENT
Start: 2024-02-21 | End: 2024-02-21

## 2024-02-21 RX ORDER — IBUPROFEN 200 MG
TABLET ORAL ONCE
OUTPATIENT
Start: 2024-02-21 | End: 2024-02-21

## 2024-02-21 RX ORDER — LIDOCAINE 40 MG/G
CREAM TOPICAL ONCE
OUTPATIENT
Start: 2024-02-21 | End: 2024-02-21

## 2024-02-21 RX ORDER — GINSENG 100 MG
CAPSULE ORAL ONCE
OUTPATIENT
Start: 2024-02-21 | End: 2024-02-21

## 2024-02-21 RX ORDER — SODIUM CHLOR/HYPOCHLOROUS ACID 0.033 %
SOLUTION, IRRIGATION IRRIGATION ONCE
OUTPATIENT
Start: 2024-02-21 | End: 2024-02-21

## 2024-02-21 NOTE — PROGRESS NOTES
[facility]                                                   Progress Note and Procedure Note      Garry Gamez  MEDICAL RECORD NUMBER:  [id number]  AGE: 63 y.o.   GENDER: male  : 1960  EPISODE DATE:  2024    Subjective:     Chief Complaint   Patient presents with    Wound Check         HISTORY of PRESENT ILLNESS HPI     Garry Gamez is a 63 y.o. male who presents today for wound/ulcer evaluation.   History of Wound Context: Patient is status post left femoral to popliteal bypass on 2024.  Patient is doing well.  Patient denies any rest pain.  Patient does have some left foot swelling which is normal postoperative edema.    Wound/Ulcer Pain Timing/Severity: none  Quality of pain: N/A  Severity:  0 / 10   Modifying Factors: None  Associated Signs/Symptoms: none    Ulcer Identification:  Ulcer Type: non-healing surgical  Contributing Factors: smoking    Wound: N/A        PAST MEDICAL HISTORY        Diagnosis Date    Alcoholism (HCC)     Anxiety     Hyperlipidemia     Hypertension        PAST SURGICAL HISTORY    Past Surgical History:   Procedure Laterality Date    FEMORAL BYPASS Left 2024    LEFT FEMORAL TO POPLITEAL BYPASS WITH VEIN VS. SYNTHETIC BYPASS GRAPH  T&S ON ARRIVAL AND ALL OTHER LABS & H&P IN SYSTEM  C-ARM performed by Naomi Gallardo MD at [facility] [place]    TONSILLECTOMY         FAMILY HISTORY    Family History   Problem Relation Age of Onset    Heart Disease Father        SOCIAL HISTORY    Social History     Tobacco Use    Smoking status: Every Day     Current packs/day: 1.00     Average packs/day: 1 pack/day for 20.0 years (20.0 ttl pk-yrs)     Types: Cigarettes     Passive exposure: Never    Smokeless tobacco: Never   Vaping Use    Vaping Use: Never used   Substance Use Topics    Alcohol use: Not Currently     Comment: pt has not had a drink in 6 months/history of alcoholism    Drug use: No       ALLERGIES    No Known Allergies    MEDICATIONS    Current Outpatient

## 2024-02-21 NOTE — DISCHARGE INSTRUCTIONS
[facility]   Physician Orders and Discharge Instructions  [facility]  [address] [place]  [address]  Telephone: [phone]      [phone]        NAME:  Garry Gamez                                                                                            YOB: 1960  MEDICAL RECORD NUMBER:  [id number]     Your  is:  [facility] Care/Facility: None     Wound Location: Left Lower Leg - 2 small spots     Dressing orders:   1.Cleanse wound(s) with normal saline.  2. Apply dry HYDROFERA BLUE READY FOAM or equivalent to wound bed.  NOTE: If your HYDROFRERA BLUE is not soft and pliable, moisten with saline until it is sponge like and then ring out excess saline and apply to wound bed.  3. Cover with 4x4's and wrap with gauze (florence or kerlix)  4. Change  Every other day or Monday, Wednesday, and Friday         Compression: None     Offloading Device: None     Other Instructions:      Keep all dressings clean, dry and intact.  Keep pressure off the wound(s) at all times.      Follow up visit   2 Weeks: March 6, 2024 @ 10:15 am     Please give 24 hour notice if unable to keep appointment. [phone]     If you experience any of the following, please call the Wound Care Service at  [phone] or go to the nearest emergency room.        *Increase in pain         *Temperature over 101           *Increase in drainage from your wound or a foul odor  *Uncontrolled swelling            *Need for compression bandage changes due to slippage, breakthrough drainage       PLEASE NOTE: IF YOU ARE UNABLE TO OBTAIN WOUND SUPPLIES, CONTINUE TO USE THE SUPPLIES YOU HAVE AVAILABLE UNTIL YOU ARE ABLE TO REACH US. IT IS MOST IMPORTANT TO KEEP THE WOUND COVERED AT ALL TIMES    Electronically signed by Naomi Gallardo MD on 2/21/2024 at 11:12 AM

## 2024-03-06 ENCOUNTER — HOSPITAL ENCOUNTER (OUTPATIENT)
Dept: WOUND CARE | Age: 64
Discharge: HOME | End: 2024-03-06
Payer: COMMERCIAL

## 2024-03-06 VITALS
DIASTOLIC BLOOD PRESSURE: 80 MMHG | TEMPERATURE: 97.7 F | RESPIRATION RATE: 18 BRPM | SYSTOLIC BLOOD PRESSURE: 132 MMHG | HEART RATE: 76 BPM

## 2024-03-06 DIAGNOSIS — L97.221 CALF ULCER, LEFT, LIMITED TO BREAKDOWN OF SKIN (HCC): Primary | ICD-10-CM

## 2024-03-06 PROCEDURE — 99024 POSTOP FOLLOW-UP VISIT: CPT | Performed by: SURGERY

## 2024-03-06 PROCEDURE — 99212 OFFICE O/P EST SF 10 MIN: CPT

## 2024-03-06 RX ORDER — GENTAMICIN SULFATE 1 MG/G
OINTMENT TOPICAL ONCE
OUTPATIENT
Start: 2024-03-06 | End: 2024-03-06

## 2024-03-06 RX ORDER — GINSENG 100 MG
CAPSULE ORAL ONCE
OUTPATIENT
Start: 2024-03-06 | End: 2024-03-06

## 2024-03-06 RX ORDER — IBUPROFEN 200 MG
TABLET ORAL ONCE
OUTPATIENT
Start: 2024-03-06 | End: 2024-03-06

## 2024-03-06 RX ORDER — LIDOCAINE HYDROCHLORIDE 20 MG/ML
JELLY TOPICAL ONCE
OUTPATIENT
Start: 2024-03-06 | End: 2024-03-06

## 2024-03-06 RX ORDER — BETAMETHASONE DIPROPIONATE 0.5 MG/G
CREAM TOPICAL ONCE
OUTPATIENT
Start: 2024-03-06 | End: 2024-03-06

## 2024-03-06 RX ORDER — TRIAMCINOLONE ACETONIDE 1 MG/G
OINTMENT TOPICAL ONCE
OUTPATIENT
Start: 2024-03-06 | End: 2024-03-06

## 2024-03-06 RX ORDER — SODIUM CHLOR/HYPOCHLOROUS ACID 0.033 %
SOLUTION, IRRIGATION IRRIGATION ONCE
OUTPATIENT
Start: 2024-03-06 | End: 2024-03-06

## 2024-03-06 RX ORDER — CLOBETASOL PROPIONATE 0.5 MG/G
OINTMENT TOPICAL ONCE
OUTPATIENT
Start: 2024-03-06 | End: 2024-03-06

## 2024-03-06 RX ORDER — LIDOCAINE 40 MG/G
CREAM TOPICAL ONCE
OUTPATIENT
Start: 2024-03-06 | End: 2024-03-06

## 2024-03-06 RX ORDER — LIDOCAINE HYDROCHLORIDE 40 MG/ML
SOLUTION TOPICAL ONCE
OUTPATIENT
Start: 2024-03-06 | End: 2024-03-06

## 2024-03-06 RX ORDER — LIDOCAINE 50 MG/G
OINTMENT TOPICAL ONCE
OUTPATIENT
Start: 2024-03-06 | End: 2024-03-06

## 2024-03-06 RX ORDER — BACITRACIN ZINC AND POLYMYXIN B SULFATE 500; 1000 [USP'U]/G; [USP'U]/G
OINTMENT TOPICAL ONCE
OUTPATIENT
Start: 2024-03-06 | End: 2024-03-06

## 2024-03-06 NOTE — PROGRESS NOTES
[facility]                                                   Progress Note and Procedure Note      Garry Gamez  MEDICAL RECORD NUMBER:  [id number]  AGE: 63 y.o.   GENDER: male  : 1960  EPISODE DATE:  2024    Subjective:     Chief Complaint   Patient presents with    Wound Check         HISTORY of PRESENT ILLNESS HPI     Garry Gamez is a 63 y.o. male who presents today for wound/ulcer evaluation.   History of Wound Context: Patient is status post left femoral to popliteal bypass on 2024.  Patient is doing well.  Patient denies any rest pain.  Patient does have some left foot swelling which is normal postoperative edema.    Wound/Ulcer Pain Timing/Severity: none  Quality of pain: N/A  Severity:  0 / 10   Modifying Factors: None  Associated Signs/Symptoms: none    Ulcer Identification:  Ulcer Type: non-healing surgical  Contributing Factors: smoking    Wound: N/A        PAST MEDICAL HISTORY        Diagnosis Date    Alcoholism (HCC)     Anxiety     Hyperlipidemia     Hypertension        PAST SURGICAL HISTORY    Past Surgical History:   Procedure Laterality Date    FEMORAL BYPASS Left 2024    LEFT FEMORAL TO POPLITEAL BYPASS WITH VEIN VS. SYNTHETIC BYPASS GRAPH  T&S ON ARRIVAL AND ALL OTHER LABS & H&P IN SYSTEM  C-ARM performed by Naomi Gallardo MD at [facility] [place]    TONSILLECTOMY         FAMILY HISTORY    Family History   Problem Relation Age of Onset    Heart Disease Father        SOCIAL HISTORY    Social History     Tobacco Use    Smoking status: Every Day     Current packs/day: 1.00     Average packs/day: 1 pack/day for 20.0 years (20.0 ttl pk-yrs)     Types: Cigarettes     Passive exposure: Never    Smokeless tobacco: Never   Vaping Use    Vaping Use: Never used   Substance Use Topics    Alcohol use: Not Currently     Comment: pt has not had a drink in 6 months/history of alcoholism    Drug use: No       ALLERGIES    No Known Allergies    MEDICATIONS    Current Outpatient

## 2024-03-06 NOTE — DISCHARGE INSTRUCTIONS
Wound Clinic Physician Orders and Discharge Instructions  [address] [address]  Telephone: [phone]     FAX [phone]    NAME:  Garry Gamez  YOB: 1960  MEDICAL RECORD NUMBER:  [id number]  DATE:  3/6/2024    Congratulations!! You have completed your treatment.   1. Return to your Primary Care Physician for all your health issues.   2. Resume your ordinary activities as tolerated.   3. Take your medications as prescribed by your primary care physician.   4. Check your skin daily for cracks, bruises, sores, or dryness. Use a moisturizer as needed.   5. Clean and dry your skin, using mild soap and warm water (not hot).   6. Avoid alcohol and caffeine and do not smoke.   7. Maintain a nutritious diet.   8. Avoid pressure on your wound site. Keep your legs elevated above the level of the heart whenever possible.     PLEASE follow up with Dr. Gallardo in his office    THANK YOU FOR ALLOWING US TO SERVE YOU. PLEASE CALL IF YOU DEVELOP ANOTHER WOUND. [phone]         Electronically signed by Kimberly Cristina RN on 3/6/2024 at 10:44 AM    Electronically signed by Naomi Gallardo MD on 3/6/2024 at 10:48 AM

## 2024-03-14 ENCOUNTER — OFFICE VISIT (OUTPATIENT)
Dept: PRIMARY CARE CLINIC | Age: 64
End: 2024-03-14
Payer: COMMERCIAL

## 2024-03-14 VITALS
TEMPERATURE: 97.2 F | HEIGHT: 70 IN | BODY MASS INDEX: 23.62 KG/M2 | HEART RATE: 63 BPM | DIASTOLIC BLOOD PRESSURE: 78 MMHG | SYSTOLIC BLOOD PRESSURE: 120 MMHG | WEIGHT: 165 LBS | OXYGEN SATURATION: 96 %

## 2024-03-14 DIAGNOSIS — Z12.11 SCREENING FOR COLON CANCER: ICD-10-CM

## 2024-03-14 DIAGNOSIS — Z00.00 ROUTINE ADULT HEALTH MAINTENANCE: Primary | ICD-10-CM

## 2024-03-14 DIAGNOSIS — F32.A DEPRESSION, UNSPECIFIED DEPRESSION TYPE: ICD-10-CM

## 2024-03-14 DIAGNOSIS — R53.83 OTHER FATIGUE: ICD-10-CM

## 2024-03-14 DIAGNOSIS — N52.9 ERECTILE DYSFUNCTION, UNSPECIFIED ERECTILE DYSFUNCTION TYPE: ICD-10-CM

## 2024-03-14 DIAGNOSIS — E78.5 HYPERLIPIDEMIA, UNSPECIFIED HYPERLIPIDEMIA TYPE: ICD-10-CM

## 2024-03-14 DIAGNOSIS — H61.22 IMPACTED CERUMEN OF LEFT EAR: Primary | ICD-10-CM

## 2024-03-14 PROCEDURE — 69209 REMOVE IMPACTED EAR WAX UNI: CPT | Performed by: STUDENT IN AN ORGANIZED HEALTH CARE EDUCATION/TRAINING PROGRAM

## 2024-03-14 PROCEDURE — 99214 OFFICE O/P EST MOD 30 MIN: CPT | Performed by: STUDENT IN AN ORGANIZED HEALTH CARE EDUCATION/TRAINING PROGRAM

## 2024-03-14 RX ORDER — SILDENAFIL 50 MG/1
50 TABLET, FILM COATED ORAL DAILY PRN
Qty: 20 TABLET | Refills: 0 | Status: SHIPPED | OUTPATIENT
Start: 2024-03-14

## 2024-03-14 NOTE — PROGRESS NOTES
round, and reactive to light.   Cardiovascular:      Rate and Rhythm: Normal rate and regular rhythm.      Pulses: Normal pulses.      Heart sounds: Normal heart sounds.   Pulmonary:      Effort: No respiratory distress.      Breath sounds: Normal breath sounds.   Abdominal:      Tenderness: There is no abdominal tenderness.   Musculoskeletal:      Cervical back: Normal range of motion.   Skin:     Findings: No rash.   Neurological:      General: No focal deficit present.      Mental Status: He is alert and oriented to person, place, and time.   Psychiatric:         Mood and Affect: Mood normal.               Reviewed with the patient: current clinical status, medications, activities and diet.     Side effects, adverse effects of the medication prescribed today, as well as treatment plan/ rationale and result expectations have been discussed with the patient who expresses understanding and desires to proceed.    Close follow up to evaluate treatment results and for coordination of care.  I have reviewed the patient's medical history in detail and updated the computerized patient record.    Lizandro Ruffin MD

## 2024-03-29 LAB — NONINV COLON CA DNA+OCC BLD SCRN STL QL: NEGATIVE

## 2024-04-01 DIAGNOSIS — F33.1 MODERATE EPISODE OF RECURRENT MAJOR DEPRESSIVE DISORDER (HCC): ICD-10-CM

## 2024-04-01 DIAGNOSIS — K21.9 GASTROESOPHAGEAL REFLUX DISEASE, UNSPECIFIED WHETHER ESOPHAGITIS PRESENT: ICD-10-CM

## 2024-04-01 RX ORDER — OMEPRAZOLE 40 MG/1
40 CAPSULE, DELAYED RELEASE ORAL
Qty: 90 CAPSULE | Refills: 0 | Status: SHIPPED | OUTPATIENT
Start: 2024-04-01

## 2024-04-01 RX ORDER — SERTRALINE HYDROCHLORIDE 25 MG/1
50 TABLET, FILM COATED ORAL DAILY
Qty: 90 TABLET | Refills: 0 | Status: SHIPPED | OUTPATIENT
Start: 2024-04-01

## 2024-05-14 ENCOUNTER — OFFICE VISIT (OUTPATIENT)
Dept: PRIMARY CARE CLINIC | Age: 64
End: 2024-05-14
Payer: COMMERCIAL

## 2024-05-14 VITALS
SYSTOLIC BLOOD PRESSURE: 112 MMHG | BODY MASS INDEX: 23.94 KG/M2 | DIASTOLIC BLOOD PRESSURE: 68 MMHG | HEIGHT: 70 IN | WEIGHT: 167.2 LBS | HEART RATE: 67 BPM | OXYGEN SATURATION: 97 %

## 2024-05-14 DIAGNOSIS — I70.212 ATHEROSCLEROSIS OF NATIVE ARTERY OF LEFT LOWER EXTREMITY WITH INTERMITTENT CLAUDICATION (HCC): ICD-10-CM

## 2024-05-14 DIAGNOSIS — N52.9 ERECTILE DYSFUNCTION, UNSPECIFIED ERECTILE DYSFUNCTION TYPE: ICD-10-CM

## 2024-05-14 DIAGNOSIS — M48.061 SPINAL STENOSIS AT L4-L5 LEVEL: Primary | ICD-10-CM

## 2024-05-14 DIAGNOSIS — M48.061 SPINAL STENOSIS AT L4-L5 LEVEL: ICD-10-CM

## 2024-05-14 DIAGNOSIS — I10 ESSENTIAL (PRIMARY) HYPERTENSION: ICD-10-CM

## 2024-05-14 LAB
ANION GAP SERPL CALCULATED.3IONS-SCNC: 12 MEQ/L (ref 9–15)
BUN SERPL-MCNC: 7 MG/DL (ref 8–23)
CALCIUM SERPL-MCNC: 9.1 MG/DL (ref 8.5–9.9)
CHLORIDE SERPL-SCNC: 101 MEQ/L (ref 95–107)
CO2 SERPL-SCNC: 23 MEQ/L (ref 20–31)
CREAT SERPL-MCNC: 0.81 MG/DL (ref 0.7–1.2)
GLUCOSE SERPL-MCNC: 109 MG/DL (ref 70–99)
POTASSIUM SERPL-SCNC: 4.1 MEQ/L (ref 3.4–4.9)
SODIUM SERPL-SCNC: 136 MEQ/L (ref 135–144)

## 2024-05-14 PROCEDURE — 99214 OFFICE O/P EST MOD 30 MIN: CPT | Performed by: STUDENT IN AN ORGANIZED HEALTH CARE EDUCATION/TRAINING PROGRAM

## 2024-05-14 RX ORDER — GABAPENTIN 400 MG/1
400 CAPSULE ORAL 3 TIMES DAILY
Qty: 270 CAPSULE | Refills: 0 | OUTPATIENT
Start: 2024-05-14 | End: 2024-08-12

## 2024-05-14 RX ORDER — GABAPENTIN 400 MG/1
400 CAPSULE ORAL 3 TIMES DAILY
Qty: 270 CAPSULE | Refills: 0 | Status: SHIPPED | OUTPATIENT
Start: 2024-05-14 | End: 2024-08-12

## 2024-05-14 RX ORDER — ATORVASTATIN CALCIUM 20 MG/1
20 TABLET, FILM COATED ORAL DAILY
Qty: 90 TABLET | Refills: 0 | Status: SHIPPED | OUTPATIENT
Start: 2024-05-14

## 2024-05-14 RX ORDER — LOSARTAN POTASSIUM 25 MG/1
25 TABLET ORAL DAILY
Qty: 90 TABLET | Refills: 0 | Status: SHIPPED | OUTPATIENT
Start: 2024-05-14

## 2024-05-14 SDOH — ECONOMIC STABILITY: FOOD INSECURITY: WITHIN THE PAST 12 MONTHS, THE FOOD YOU BOUGHT JUST DIDN'T LAST AND YOU DIDN'T HAVE MONEY TO GET MORE.: NEVER TRUE

## 2024-05-14 SDOH — ECONOMIC STABILITY: FOOD INSECURITY: WITHIN THE PAST 12 MONTHS, YOU WORRIED THAT YOUR FOOD WOULD RUN OUT BEFORE YOU GOT MONEY TO BUY MORE.: NEVER TRUE

## 2024-05-14 SDOH — ECONOMIC STABILITY: INCOME INSECURITY: HOW HARD IS IT FOR YOU TO PAY FOR THE VERY BASICS LIKE FOOD, HOUSING, MEDICAL CARE, AND HEATING?: NOT HARD AT ALL

## 2024-05-14 NOTE — PROGRESS NOTES
Mental Status: He is alert and oriented to person, place, and time.   Psychiatric:         Mood and Affect: Mood normal.               Reviewed with the patient: current clinical status, medications, activities and diet.     Side effects, adverse effects of the medication prescribed today, as well as treatment plan/ rationale and result expectations have been discussed with the patient who expresses understanding and desires to proceed.    Close follow up to evaluate treatment results and for coordination of care.  I have reviewed the patient's medical history in detail and updated the computerized patient record.    Lizandro Ruffin MD

## 2024-05-15 DIAGNOSIS — M48.061 SPINAL STENOSIS AT L4-L5 LEVEL: ICD-10-CM

## 2024-05-19 LAB
6MAM UR QL: NOT DETECTED
7-AMINOCLONAZEPAM: NOT DETECTED
ALPHA-OH-ALPRAZOLAM: NOT DETECTED
ALPHA-OH-MIDAZOLAM, URINE: NOT DETECTED
ALPRAZOLAM: NOT DETECTED
AMPHET UR QL SCN: NOT DETECTED
BARBITURATES: NEGATIVE
BENZOYLECGONINE: NEGATIVE
BUPRENORPHINE: NOT DETECTED
CARISOPRODOL UR QL: NEGATIVE
CLONAZEPAM UR QL: NOT DETECTED
CODEINE: NOT DETECTED
CREAT UR-MCNC: 135.6 MG/DL (ref 20–400)
DIAZEPAM: NOT DETECTED
ETHYL GLUCURONIDE: NEGATIVE
FENTANYL UR QL: NOT DETECTED
GABAPENTIN: PRESENT
HYDROCODONE UR QL: NOT DETECTED
HYDROMORPHONE: NOT DETECTED
LORAZEPAM UR QL: NOT DETECTED
MARIJUANA METABOLITE: NORMAL
MDA: NOT DETECTED
MDEA: NOT DETECTED
MDMA UR QL: NOT DETECTED
MEPERIDINE: NOT DETECTED
METHADONE: NEGATIVE
METHAMPHETAMINE: NOT DETECTED
METHYLPHENIDATE: NOT DETECTED
MIDAZOLAM UR QL SCN: NOT DETECTED
MORPHINE: NOT DETECTED
NALOXONE: NOT DETECTED
NORBUPRENORPHINE, FREE: NOT DETECTED
NORDIAZEPAM: NOT DETECTED
NORFENTANYL: NOT DETECTED
NORHYDROCODONE, URINE: NOT DETECTED
NOROXYCODONE: NOT DETECTED
NOROXYMORPHONE, URINE: NOT DETECTED
OXAZEPAM UR QL: NOT DETECTED
OXYCODONE UR QL: NOT DETECTED
OXYMORPHONE UR QL: NOT DETECTED
PAIN MANAGEMENT DRUG PANEL: NORMAL
PATHOLOGY STUDY: NORMAL
PCP: NEGATIVE
PHENTERMINE: NOT DETECTED
PREGABALIN: NOT DETECTED
TAPENTADOL, URINE: NOT DETECTED
TAPENTADOL-O-SULFATE, URINE: NOT DETECTED
TEMAZEPAM: NOT DETECTED
TRAMADOL: NEGATIVE
ZOLPIDEM: NOT DETECTED

## 2024-05-20 ENCOUNTER — TRANSCRIBE ORDERS (OUTPATIENT)
Dept: ADMINISTRATIVE | Age: 64
End: 2024-05-20

## 2024-05-22 DIAGNOSIS — F33.1 MODERATE EPISODE OF RECURRENT MAJOR DEPRESSIVE DISORDER (HCC): ICD-10-CM

## 2024-05-23 RX ORDER — SERTRALINE HYDROCHLORIDE 25 MG/1
50 TABLET, FILM COATED ORAL DAILY
Qty: 90 TABLET | Refills: 0 | Status: SHIPPED | OUTPATIENT
Start: 2024-05-23

## 2024-05-30 ENCOUNTER — HOSPITAL ENCOUNTER (OUTPATIENT)
Dept: CT IMAGING | Age: 64
Discharge: HOME | End: 2024-06-01
Attending: SURGERY
Payer: COMMERCIAL

## 2024-05-30 DIAGNOSIS — I72.3 ANEURYSM OF ILIAC ARTERY (HCC): ICD-10-CM

## 2024-05-30 PROCEDURE — 6360000004 HC RX CONTRAST MEDICATION: Performed by: SURGERY

## 2024-05-30 PROCEDURE — 74174 CTA ABD&PLVS W/CONTRAST: CPT

## 2024-05-30 RX ADMIN — IOPAMIDOL 75 ML: 755 INJECTION, SOLUTION INTRAVENOUS at 09:18

## 2024-06-13 ENCOUNTER — OFFICE VISIT (OUTPATIENT)
Dept: PRIMARY CARE CLINIC | Age: 64
End: 2024-06-13
Payer: COMMERCIAL

## 2024-06-13 VITALS
HEART RATE: 97 BPM | OXYGEN SATURATION: 98 % | DIASTOLIC BLOOD PRESSURE: 80 MMHG | SYSTOLIC BLOOD PRESSURE: 128 MMHG | BODY MASS INDEX: 23.37 KG/M2 | WEIGHT: 163.2 LBS | HEIGHT: 70 IN

## 2024-06-13 DIAGNOSIS — R74.01 TRANSAMINITIS: ICD-10-CM

## 2024-06-13 DIAGNOSIS — N52.9 ERECTILE DYSFUNCTION, UNSPECIFIED ERECTILE DYSFUNCTION TYPE: ICD-10-CM

## 2024-06-13 DIAGNOSIS — I74.3 EMBOLISM AND THROMBOSIS OF ARTERIES OF LOWER EXTREMITY (HCC): ICD-10-CM

## 2024-06-13 DIAGNOSIS — K21.9 GASTROESOPHAGEAL REFLUX DISEASE, UNSPECIFIED WHETHER ESOPHAGITIS PRESENT: ICD-10-CM

## 2024-06-13 DIAGNOSIS — M48.061 SPINAL STENOSIS AT L4-L5 LEVEL: Primary | ICD-10-CM

## 2024-06-13 DIAGNOSIS — I70.212 ATHEROSCLEROSIS OF NATIVE ARTERY OF LEFT LOWER EXTREMITY WITH INTERMITTENT CLAUDICATION (HCC): ICD-10-CM

## 2024-06-13 DIAGNOSIS — F41.1 ANXIETY STATE: ICD-10-CM

## 2024-06-13 PROCEDURE — 99214 OFFICE O/P EST MOD 30 MIN: CPT | Performed by: STUDENT IN AN ORGANIZED HEALTH CARE EDUCATION/TRAINING PROGRAM

## 2024-06-13 RX ORDER — OMEPRAZOLE 40 MG/1
40 CAPSULE, DELAYED RELEASE ORAL
Qty: 90 CAPSULE | Refills: 1 | Status: SHIPPED | OUTPATIENT
Start: 2024-06-13

## 2024-06-13 NOTE — PROGRESS NOTES
6/13/2024        Garry Gamez 1960 is a 64 y.o. male who presents today with:  Chief Complaint   Patient presents with    Back Pain     Patient here for follow up medication and back pain.  Patient states had heart blockage but now has Social Security.         HPI:   PMHx of Depression and HTN     Spinal stenosis L4-L5  Takes gabapentin for the pain and believes that it works well.  He would like to continue without decreasing the dose.  He denies any side effects.  He denies any illicit drug use.     Erectile dysfunction  Lab work is pending  He states that he has tried sildenafil 50 mg once.    GERD  denies dysphagia, unexpected weight loss, melena, hematochezia, or episodes of acid reflux. Tolerating meds well.     Anxiety  Patient denies any paranoia, visual or auditory hallucinations, suicidal or homicidal ideations.     Atherosclerosis of artery of left lower extremity and right lower extremity  Status postsurgery of the left lower extremity embolus, doing well but continues to have weakness of the left lower extremity.  He states that he has also been diagnosed with an embolus of the right lower extremity and is planning surgery.    Transaminitis - with elevated AST on lab work 2022, has not been repeated yet    Health Maintenance  Colonoscopy: Cologuard completed 3/23/2024 and normal  PNA VAX: Not due  Shingles VAX: Declined  Tetanus VAX: Declined    Assessment & Plan   1. Spinal stenosis at L4-L5 level  2. Erectile dysfunction, unspecified erectile dysfunction type  3. Transaminitis  -     Comprehensive Metabolic Panel; Future  4. Gastroesophageal reflux disease, unspecified whether esophagitis present  -     omeprazole (PRILOSEC) 40 MG delayed release capsule; Take 1 capsule by mouth every morning (before breakfast), Disp-90 capsule, R-1Normal  5. Atherosclerosis of native artery of left lower extremity with intermittent claudication (HCC)  6. Embolism and thrombosis of arteries of lower

## 2024-07-03 NOTE — PROGRESS NOTES
Phone PAT completed, pt given pre-op instructions (per orange sheet) and verbalized understanding. Aware of 0545 arrival time. Pt instructed to take ASA AM of OR with SOW per Dr. Gallardo and instructed to use albuterol inhaler if needed. T/S, CBC, BMP & EKG ordered for AM of OR.  Electronically signed by Eileen Billings RN on 7/3/2024 at 4:57 PM

## 2024-07-05 ENCOUNTER — APPOINTMENT (OUTPATIENT)
Dept: GENERAL RADIOLOGY | Age: 64
End: 2024-07-05
Attending: SURGERY
Payer: COMMERCIAL

## 2024-07-05 ENCOUNTER — HOSPITAL ENCOUNTER (OUTPATIENT)
Age: 64
Setting detail: OUTPATIENT SURGERY
Discharge: HOME | End: 2024-07-05
Attending: SURGERY | Admitting: SURGERY
Payer: COMMERCIAL

## 2024-07-05 ENCOUNTER — ANESTHESIA EVENT (OUTPATIENT)
Dept: OPERATING ROOM | Age: 64
End: 2024-07-05
Payer: COMMERCIAL

## 2024-07-05 ENCOUNTER — ANESTHESIA (OUTPATIENT)
Dept: OPERATING ROOM | Age: 64
End: 2024-07-05
Payer: COMMERCIAL

## 2024-07-05 VITALS
OXYGEN SATURATION: 97 % | RESPIRATION RATE: 16 BRPM | SYSTOLIC BLOOD PRESSURE: 133 MMHG | WEIGHT: 163 LBS | HEIGHT: 70 IN | DIASTOLIC BLOOD PRESSURE: 79 MMHG | BODY MASS INDEX: 23.34 KG/M2 | TEMPERATURE: 97.2 F | HEART RATE: 44 BPM

## 2024-07-05 LAB
ABO + RH BLD: NORMAL
ANION GAP SERPL CALCULATED.3IONS-SCNC: 12 MEQ/L (ref 9–15)
BLD GP AB SCN SERPL QL: NORMAL
BUN SERPL-MCNC: 11 MG/DL (ref 8–23)
CALCIUM SERPL-MCNC: 8.9 MG/DL (ref 8.5–9.9)
CHLORIDE SERPL-SCNC: 104 MEQ/L (ref 95–107)
CO2 SERPL-SCNC: 21 MEQ/L (ref 20–31)
CREAT SERPL-MCNC: 0.9 MG/DL (ref 0.7–1.2)
EKG ATRIAL RATE: 48 BPM
EKG P AXIS: 40 DEGREES
EKG P-R INTERVAL: 166 MS
EKG Q-T INTERVAL: 452 MS
EKG QRS DURATION: 84 MS
EKG QTC CALCULATION (BAZETT): 403 MS
EKG R AXIS: 70 DEGREES
EKG T AXIS: 72 DEGREES
EKG VENTRICULAR RATE: 48 BPM
ERYTHROCYTE [DISTWIDTH] IN BLOOD BY AUTOMATED COUNT: 15.2 % (ref 11.5–14.5)
GLUCOSE SERPL-MCNC: 140 MG/DL (ref 70–99)
HCT VFR BLD AUTO: 43.3 % (ref 42–52)
HGB BLD-MCNC: 14.9 G/DL (ref 14–18)
MCH RBC QN AUTO: 30.4 PG (ref 27–31.3)
MCHC RBC AUTO-ENTMCNC: 34.4 % (ref 33–37)
MCV RBC AUTO: 88.4 FL (ref 79–92.2)
PLATELET # BLD AUTO: 194 K/UL (ref 130–400)
POTASSIUM SERPL-SCNC: 4.3 MEQ/L (ref 3.4–4.9)
RBC # BLD AUTO: 4.9 M/UL (ref 4.7–6.1)
SODIUM SERPL-SCNC: 137 MEQ/L (ref 135–144)
WBC # BLD AUTO: 9.3 K/UL (ref 4.8–10.8)

## 2024-07-05 PROCEDURE — 93005 ELECTROCARDIOGRAM TRACING: CPT

## 2024-07-05 PROCEDURE — 7100000000 HC PACU RECOVERY - FIRST 15 MIN: Performed by: SURGERY

## 2024-07-05 PROCEDURE — 76000 FLUOROSCOPY <1 HR PHYS/QHP: CPT

## 2024-07-05 PROCEDURE — A4217 STERILE WATER/SALINE, 500 ML: HCPCS | Performed by: SURGERY

## 2024-07-05 PROCEDURE — 2580000003 HC RX 258: Performed by: SURGERY

## 2024-07-05 PROCEDURE — C1769 GUIDE WIRE: HCPCS | Performed by: SURGERY

## 2024-07-05 PROCEDURE — C1889 IMPLANT/INSERT DEVICE, NOC: HCPCS | Performed by: SURGERY

## 2024-07-05 PROCEDURE — 6360000004 HC RX CONTRAST MEDICATION: Performed by: SURGERY

## 2024-07-05 PROCEDURE — C1894 INTRO/SHEATH, NON-LASER: HCPCS | Performed by: SURGERY

## 2024-07-05 PROCEDURE — 3600000004 HC SURGERY LEVEL 4 BASE: Performed by: SURGERY

## 2024-07-05 PROCEDURE — 7100000011 HC PHASE II RECOVERY - ADDTL 15 MIN: Performed by: SURGERY

## 2024-07-05 PROCEDURE — C1760 CLOSURE DEV, VASC: HCPCS | Performed by: SURGERY

## 2024-07-05 PROCEDURE — 6360000002 HC RX W HCPCS: Performed by: SURGERY

## 2024-07-05 PROCEDURE — 86850 RBC ANTIBODY SCREEN: CPT

## 2024-07-05 PROCEDURE — 2500000003 HC RX 250 WO HCPCS

## 2024-07-05 PROCEDURE — 3700000000 HC ANESTHESIA ATTENDED CARE: Performed by: SURGERY

## 2024-07-05 PROCEDURE — 3600000014 HC SURGERY LEVEL 4 ADDTL 15MIN: Performed by: SURGERY

## 2024-07-05 PROCEDURE — 86900 BLOOD TYPING SEROLOGIC ABO: CPT

## 2024-07-05 PROCEDURE — 7100000001 HC PACU RECOVERY - ADDTL 15 MIN: Performed by: SURGERY

## 2024-07-05 PROCEDURE — 6360000002 HC RX W HCPCS

## 2024-07-05 PROCEDURE — 7100000010 HC PHASE II RECOVERY - FIRST 15 MIN: Performed by: SURGERY

## 2024-07-05 PROCEDURE — 80048 BASIC METABOLIC PNL TOTAL CA: CPT

## 2024-07-05 PROCEDURE — 2709999900 HC NON-CHARGEABLE SUPPLY: Performed by: SURGERY

## 2024-07-05 PROCEDURE — 3700000001 HC ADD 15 MINUTES (ANESTHESIA): Performed by: SURGERY

## 2024-07-05 PROCEDURE — 85027 COMPLETE CBC AUTOMATED: CPT

## 2024-07-05 PROCEDURE — 86901 BLOOD TYPING SEROLOGIC RH(D): CPT

## 2024-07-05 PROCEDURE — 6370000000 HC RX 637 (ALT 250 FOR IP)

## 2024-07-05 PROCEDURE — 2580000003 HC RX 258: Performed by: STUDENT IN AN ORGANIZED HEALTH CARE EDUCATION/TRAINING PROGRAM

## 2024-07-05 PROCEDURE — C1887 CATHETER, GUIDING: HCPCS | Performed by: SURGERY

## 2024-07-05 PROCEDURE — C1713 ANCHOR/SCREW BN/BN,TIS/BN: HCPCS | Performed by: SURGERY

## 2024-07-05 DEVICE — COIL NV-7-30-HELIX V07
Type: IMPLANTABLE DEVICE | Site: GROIN | Status: FUNCTIONAL
Brand: CONCERTO DETACHABLE COIL SYSTEM

## 2024-07-05 DEVICE — COIL PV-6-20-3D V04
Type: IMPLANTABLE DEVICE | Site: GROIN | Status: FUNCTIONAL
Brand: CONCERTOTM

## 2024-07-05 DEVICE — COIL NV-2-4-HELIX V07
Type: IMPLANTABLE DEVICE | Site: GROIN | Status: FUNCTIONAL
Brand: CONCERTO DETACHABLE COIL SYSTEM

## 2024-07-05 RX ORDER — SODIUM CHLORIDE 0.9 % (FLUSH) 0.9 %
5-40 SYRINGE (ML) INJECTION EVERY 12 HOURS SCHEDULED
Status: DISCONTINUED | OUTPATIENT
Start: 2024-07-05 | End: 2024-07-05 | Stop reason: HOSPADM

## 2024-07-05 RX ORDER — SODIUM CHLORIDE 0.9 % (FLUSH) 0.9 %
5-40 SYRINGE (ML) INJECTION PRN
Status: DISCONTINUED | OUTPATIENT
Start: 2024-07-05 | End: 2024-07-05 | Stop reason: HOSPADM

## 2024-07-05 RX ORDER — DIPHENHYDRAMINE HYDROCHLORIDE 50 MG/ML
12.5 INJECTION INTRAMUSCULAR; INTRAVENOUS
Status: DISCONTINUED | OUTPATIENT
Start: 2024-07-05 | End: 2024-07-05 | Stop reason: HOSPADM

## 2024-07-05 RX ORDER — FENTANYL CITRATE 0.05 MG/ML
25 INJECTION, SOLUTION INTRAMUSCULAR; INTRAVENOUS EVERY 5 MIN PRN
Status: DISCONTINUED | OUTPATIENT
Start: 2024-07-05 | End: 2024-07-05 | Stop reason: HOSPADM

## 2024-07-05 RX ORDER — OXYCODONE HYDROCHLORIDE 5 MG/1
10 TABLET ORAL PRN
Status: DISCONTINUED | OUTPATIENT
Start: 2024-07-05 | End: 2024-07-05 | Stop reason: HOSPADM

## 2024-07-05 RX ORDER — NALOXONE HYDROCHLORIDE 0.4 MG/ML
INJECTION, SOLUTION INTRAMUSCULAR; INTRAVENOUS; SUBCUTANEOUS PRN
Status: DISCONTINUED | OUTPATIENT
Start: 2024-07-05 | End: 2024-07-05 | Stop reason: HOSPADM

## 2024-07-05 RX ORDER — PROCHLORPERAZINE EDISYLATE 5 MG/ML
5 INJECTION INTRAMUSCULAR; INTRAVENOUS
Status: DISCONTINUED | OUTPATIENT
Start: 2024-07-05 | End: 2024-07-05 | Stop reason: HOSPADM

## 2024-07-05 RX ORDER — FENTANYL CITRATE 0.05 MG/ML
50 INJECTION, SOLUTION INTRAMUSCULAR; INTRAVENOUS EVERY 5 MIN PRN
Status: DISCONTINUED | OUTPATIENT
Start: 2024-07-05 | End: 2024-07-05 | Stop reason: HOSPADM

## 2024-07-05 RX ORDER — SODIUM CHLORIDE, SODIUM LACTATE, POTASSIUM CHLORIDE, CALCIUM CHLORIDE 600; 310; 30; 20 MG/100ML; MG/100ML; MG/100ML; MG/100ML
INJECTION, SOLUTION INTRAVENOUS CONTINUOUS
Status: DISCONTINUED | OUTPATIENT
Start: 2024-07-05 | End: 2024-07-05 | Stop reason: HOSPADM

## 2024-07-05 RX ORDER — LIDOCAINE HYDROCHLORIDE 10 MG/ML
1 INJECTION, SOLUTION EPIDURAL; INFILTRATION; INTRACAUDAL; PERINEURAL
Status: DISCONTINUED | OUTPATIENT
Start: 2024-07-05 | End: 2024-07-05 | Stop reason: HOSPADM

## 2024-07-05 RX ORDER — EPHEDRINE SULFATE/0.9% NACL/PF 25 MG/5 ML
SYRINGE (ML) INTRAVENOUS PRN
Status: DISCONTINUED | OUTPATIENT
Start: 2024-07-05 | End: 2024-07-05 | Stop reason: SDUPTHER

## 2024-07-05 RX ORDER — LABETALOL HYDROCHLORIDE 5 MG/ML
10 INJECTION, SOLUTION INTRAVENOUS
Status: DISCONTINUED | OUTPATIENT
Start: 2024-07-05 | End: 2024-07-05 | Stop reason: HOSPADM

## 2024-07-05 RX ORDER — MAGNESIUM HYDROXIDE 1200 MG/15ML
LIQUID ORAL CONTINUOUS PRN
Status: DISCONTINUED | OUTPATIENT
Start: 2024-07-05 | End: 2024-07-05 | Stop reason: HOSPADM

## 2024-07-05 RX ORDER — PROPOFOL 10 MG/ML
INJECTION, EMULSION INTRAVENOUS CONTINUOUS PRN
Status: DISCONTINUED | OUTPATIENT
Start: 2024-07-05 | End: 2024-07-05 | Stop reason: SDUPTHER

## 2024-07-05 RX ORDER — SODIUM CHLORIDE 9 MG/ML
INJECTION, SOLUTION INTRAVENOUS PRN
Status: DISCONTINUED | OUTPATIENT
Start: 2024-07-05 | End: 2024-07-05 | Stop reason: HOSPADM

## 2024-07-05 RX ORDER — HYDRALAZINE HYDROCHLORIDE 20 MG/ML
10 INJECTION INTRAMUSCULAR; INTRAVENOUS
Status: DISCONTINUED | OUTPATIENT
Start: 2024-07-05 | End: 2024-07-05 | Stop reason: HOSPADM

## 2024-07-05 RX ORDER — LIDOCAINE HYDROCHLORIDE 10 MG/ML
INJECTION, SOLUTION EPIDURAL; INFILTRATION; INTRACAUDAL; PERINEURAL PRN
Status: DISCONTINUED | OUTPATIENT
Start: 2024-07-05 | End: 2024-07-05 | Stop reason: SDUPTHER

## 2024-07-05 RX ORDER — OXYCODONE HYDROCHLORIDE 5 MG/1
5 TABLET ORAL PRN
Status: DISCONTINUED | OUTPATIENT
Start: 2024-07-05 | End: 2024-07-05 | Stop reason: HOSPADM

## 2024-07-05 RX ORDER — IOPAMIDOL 612 MG/ML
INJECTION, SOLUTION INTRATHECAL PRN
Status: DISCONTINUED | OUTPATIENT
Start: 2024-07-05 | End: 2024-07-05 | Stop reason: HOSPADM

## 2024-07-05 RX ORDER — MIDAZOLAM HYDROCHLORIDE 1 MG/ML
INJECTION INTRAMUSCULAR; INTRAVENOUS PRN
Status: DISCONTINUED | OUTPATIENT
Start: 2024-07-05 | End: 2024-07-05 | Stop reason: SDUPTHER

## 2024-07-05 RX ORDER — ONDANSETRON 2 MG/ML
4 INJECTION INTRAMUSCULAR; INTRAVENOUS
Status: DISCONTINUED | OUTPATIENT
Start: 2024-07-05 | End: 2024-07-05 | Stop reason: HOSPADM

## 2024-07-05 RX ORDER — ONDANSETRON 2 MG/ML
INJECTION INTRAMUSCULAR; INTRAVENOUS PRN
Status: DISCONTINUED | OUTPATIENT
Start: 2024-07-05 | End: 2024-07-05 | Stop reason: SDUPTHER

## 2024-07-05 RX ORDER — MEPERIDINE HYDROCHLORIDE 25 MG/ML
12.5 INJECTION INTRAMUSCULAR; INTRAVENOUS; SUBCUTANEOUS EVERY 5 MIN PRN
Status: DISCONTINUED | OUTPATIENT
Start: 2024-07-05 | End: 2024-07-05 | Stop reason: HOSPADM

## 2024-07-05 RX ADMIN — SODIUM CHLORIDE, POTASSIUM CHLORIDE, SODIUM LACTATE AND CALCIUM CHLORIDE 1000 ML: 600; 310; 30; 20 INJECTION, SOLUTION INTRAVENOUS at 06:45

## 2024-07-05 RX ADMIN — PROPOFOL 30 MG: 10 INJECTION, EMULSION INTRAVENOUS at 07:40

## 2024-07-05 RX ADMIN — PROPOFOL 30 MG: 10 INJECTION, EMULSION INTRAVENOUS at 08:08

## 2024-07-05 RX ADMIN — CEFAZOLIN 2000 MG: 2 INJECTION, POWDER, FOR SOLUTION INTRAMUSCULAR; INTRAVENOUS at 07:43

## 2024-07-05 RX ADMIN — EPHEDRINE SULFATE 5 MG: 5 INJECTION INTRAVENOUS at 08:03

## 2024-07-05 RX ADMIN — ONDANSETRON 4 MG: 2 INJECTION INTRAMUSCULAR; INTRAVENOUS at 09:11

## 2024-07-05 RX ADMIN — PROPOFOL 20 MG: 10 INJECTION, EMULSION INTRAVENOUS at 08:15

## 2024-07-05 RX ADMIN — SODIUM CHLORIDE, POTASSIUM CHLORIDE, SODIUM LACTATE AND CALCIUM CHLORIDE: 600; 310; 30; 20 INJECTION, SOLUTION INTRAVENOUS at 09:22

## 2024-07-05 RX ADMIN — MIDAZOLAM HYDROCHLORIDE 2 MG: 1 INJECTION, SOLUTION INTRAMUSCULAR; INTRAVENOUS at 07:36

## 2024-07-05 RX ADMIN — EPHEDRINE SULFATE 5 MG: 5 INJECTION INTRAVENOUS at 08:45

## 2024-07-05 RX ADMIN — PROPOFOL 100 MCG/KG/MIN: 10 INJECTION, EMULSION INTRAVENOUS at 07:41

## 2024-07-05 RX ADMIN — LIDOCAINE HYDROCHLORIDE 50 MG: 10 INJECTION, SOLUTION EPIDURAL; INFILTRATION; INTRACAUDAL; PERINEURAL at 07:40

## 2024-07-05 ASSESSMENT — PAIN SCALES - GENERAL
PAINLEVEL_OUTOF10: 0

## 2024-07-05 ASSESSMENT — ENCOUNTER SYMPTOMS: RESPIRATORY NEGATIVE: 1

## 2024-07-05 ASSESSMENT — PAIN - FUNCTIONAL ASSESSMENT: PAIN_FUNCTIONAL_ASSESSMENT: 0-10

## 2024-07-05 ASSESSMENT — LIFESTYLE VARIABLES: SMOKING_STATUS: 1

## 2024-07-05 NOTE — OP NOTE
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  Coiling of anterior division of right internal iliac artery  2.  Coiling of posterior division of right internal iliac artery       Surgeon(s):  Naomi Gallardo MD    Assistant:   First Assistant: Geni Clemente    Anesthesia: Monitor Anesthesia Care    Estimated Blood Loss (mL): Minimal    Complications: None    Specimens:   * No specimens in log *    Implants:  Implant Name Type Inv. Item Serial No.  Lot No. LRB No. Used Action   COIL EMB L20CM DIA6MM CONCERTO 3D - [id number] Vascular coils COIL EMB L20CM DIA6MM CONCERTO 3D  [employer]-WD [id number] Right 1 Implanted   COIL EMB L30CM DIA7MM NYL FBR DETACH HELIX CONCERTO - [id number] Vascular coils COIL EMB L30CM DIA7MM NYL FBR DETACH HELIX CONCERTO  [employer]-WD [id number] Right 1 Implanted   COIL EMB L30CM DIA7MM NYL FBR DETACH HELIX CONCERTO - [id number] Vascular coils COIL EMB L30CM DIA7MM NYL FBR DETACH HELIX CONCERTO  [employer]-WD [id number] Right 1 Implanted   COIL EMB L4CM OD2MM MICROCATHETER COMPATIBILITY 0.0165IN - [id number] Vascular coils COIL EMB L4CM OD2MM MICROCATHETER COMPATIBILITY 0.0165IN  [employer] COVIDIEN EV3-WD [id number] Right 1 Implanted   COIL EMB L30CM DIA7MM NYL FBR DETACH HELIX CONCERTO - [id number] Vascular coils COIL EMB L30CM DIA7MM NYL FBR DETACH HELIX CONCERTO  [employer] V[employer]-WD [id number] Right 1 Implanted         Drains: * No LDAs found *    Findings:  Infection Present At Time Of Surgery (PATOS) (choose all levels that have infection present):  No infection present    Indications for procedure:  Patient is a 64-year-old male with history of a saccular right common iliac artery aneurysm.  The risk and benefits of coiling of the right internal iliac artery in

## 2024-07-05 NOTE — H&P
HISTORY AND PHYSICAL             Date: 7/5/2024        Patient Name: Garry Gamez     YOB: 1960      Age:  64 y.o.    Chief Complaint   Aneurysm    History Obtained From   patient    History of Present Illness   63 y/o with history of a saccular right common iliac artery aneurysm.    Past Medical History     Past Medical History:   Diagnosis Date    Alcoholism (HCC)     Anxiety     Atherosclerosis of native artery of left lower extremity with intermittent claudication (HCC)     Depression     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)     GERD (gastroesophageal reflux disease)     Hx of blood clots     Hyperlipidemia     Hypertension     PVD (peripheral vascular disease) (HCC)     Spinal stenosis at L4-L5 level         Past Surgical History     Past Surgical History:   Procedure Laterality Date    FEMORAL BYPASS Left 1/4/2024    LEFT FEMORAL TO POPLITEAL BYPASS WITH VEIN VS. SYNTHETIC BYPASS GRAPH  T&S ON ARRIVAL AND ALL OTHER LABS & H&P IN SYSTEM  C[place]ARM [place] [facility] [place]    TONSILLECTOMY          Medications Prior to Admission     Prior to Admission medications    Medication Sig Start Date End Date Taking? Authorizing Provider   omeprazole (PRILOSEC) 40 MG delayed release capsule Take 1 capsule by mouth every morning (before breakfast) 6/13/24   Lizandro Ruffin MD   sertraline (ZOLOFT) 25 MG tablet Take 2 tablets by mouth daily 5/23/24   Lucila Caputo MD   atorvastatin (LIPITOR) 20 MG tablet TAKE 1 TABLET BY MOUTH ONCE DAILY 5/14/24   Lizandro Ruffin MD   losartan (COZAAR) 25 MG tablet TAKE 1 TABLET BY MOUTH ONCE DAILY 5/14/24   Lizandro Ruffin MD   gabapentin (NEURONTIN) 400 MG capsule Take 1 capsule by mouth 3 times daily for 90 days. 5/14/24 8/12/24  Lizandro Ruffin MD   sildenafil (VIAGRA) 50 MG tablet Take 1 tablet by mouth daily as needed for Erectile Dysfunction 3/14/24   Lizandro Ruffin MD   albuterol sulfate

## 2024-07-05 NOTE — ANESTHESIA PRE PROCEDURE
Department of Anesthesiology  Preprocedure Note       Name:  Garry Gamez   Age:  64 y.o.  :  1960                                          MRN:  [id number]         Date:  2024      Surgeon: Surgeon(s):  Naomi Gallardo MD    Procedure: Procedure(s):  RIGHT INTERNAL ILIAC ARTERY COILING, RIGHT GROIN APPROACH    DRAW BMP, CBC ON ARRIVAL  C-ARM    Medications prior to admission:   Prior to Admission medications    Medication Sig Start Date End Date Taking? Authorizing Provider   omeprazole (PRILOSEC) 40 MG delayed release capsule Take 1 capsule by mouth every morning (before breakfast) 24   Lizandro Ruffin MD   sertraline (ZOLOFT) 25 MG tablet Take 2 tablets by mouth daily 24   Lucila Caputo MD   atorvastatin (LIPITOR) 20 MG tablet TAKE 1 TABLET BY MOUTH ONCE DAILY 24   Lizandro Ruffin MD   losartan (COZAAR) 25 MG tablet TAKE 1 TABLET BY MOUTH ONCE DAILY 24   Lizandro Ruffin MD   gabapentin (NEURONTIN) 400 MG capsule Take 1 capsule by mouth 3 times daily for 90 days. 24  Lizandro Ruffin MD   sildenafil (VIAGRA) 50 MG tablet Take 1 tablet by mouth daily as needed for Erectile Dysfunction 3/14/24   Lizandro Ruffin MD   albuterol sulfate HFA (VENTOLIN HFA) 108 (90 Base) MCG/ACT inhaler Inhale 2 puffs into the lungs every 6 hours as needed for Wheezing 2/15/24   Lizandro Ruffin MD   ASPIRIN 81 PO Take by mouth daily    Provider, MD Jay   Multiple Vitamin (MULTIVITAMIN) TABS tablet Take 1 tablet by mouth daily 2/15/22   John Cordero MD       Current medications:    Current Facility-Administered Medications   Medication Dose Route Frequency Provider Last Rate Last Admin    ceFAZolin (ANCEF) 2,000 mg in sodium chloride 0.9 % 100 mL IVPB (mini-bag)  2,000 mg IntraVENous Once Naomi Gallardo MD        lidocaine PF 1 % injection 1 mL  1 mL IntraDERmal Once PRN Kev Desouza MD        lactated ringers IV soln infusion

## 2024-07-05 NOTE — DISCHARGE INSTRUCTIONS
No heavy lifting >10 lbs x 2 weeks.  No pushing pulling straining  Ok to take dressing off Monday and shower.  F/u in office 3 wks.  Call for appt [phone]    [facility]  Outpatient Discharge Instructions    To continue your care at home, please follow the instructions below and any additional discharge instructions given to you by your physician.    GENERAL ANESTHESIA:  Do not drive or operate machinery for 24hrs after discharge,  Do not drink alcohol, take tranquilizers, sleeping medication, or any other medication not directly instructed by your physician,   Do not make any important decisions or sign any legal documents for 24hrs after surgery,  Have someone with you for 24hrs after surgery to assist you as needed.    ACTIVITY:  Light activity for 24hrs,  No heavy lifting or exercise until instructed by your physician,  You may resume normal activities once instructed by your physician,  Special Instruction: ___________________________________________________________________    FLUIDS AND DIET:  An upset stomach or feeling sick (nausea) can commonly occur after surgery and/or pain medication use. To help minimize nausea:  Do not eat a heavy meal soon after your surgery,    Start with water or other clear liquids,  Advance to mild or bland items like Jell-O, dry toast, crackers, etc.,  Avoid caffeine,  Do not drink alcohol for at least 24 hours after surgery,  Your physician may prescribe anti-nausea medication if your nausea continues,  If you are free from nausea for 24hrs, you can advance to your normal diet as tolerated.    OPERATIVE SITE:  A small amount of bleeding or drainage after surgery is normal. Your physician will provide you with specific instructions on how to care for your surgical site and/or dressing.   Try not to touch your surgical site unless necessary,   Always wash your hands BEFORE and AFTER changing your dressing if instructed by your physician,   Proper handwashing includes  fingers or toes (for surgeries on extremities)  Fever over 101° F,  Increased drainage, puss, and/or odor from surgical site,  Pain not relieved by medications ordered  Unable to urinate

## 2024-07-05 NOTE — ANESTHESIA POSTPROCEDURE EVALUATION
Department of Anesthesiology  Postprocedure Note    Patient: Garry Gamez  MRN: [id number]  YOB: 1960  Date of evaluation: 7/5/2024    Procedure Summary       Date: 07/05/24 Room / Location: [address]    Anesthesia Start: 0736 Anesthesia Stop: 0931    Procedure: RIGHT INTERNAL ILIAC ARTERY COILING, RIGHT GROIN APPROACH. (Right: Groin) Diagnosis:       Iliac artery aneurysm (HCC)      (Iliac artery aneurysm (HCC) [I72.3])    Surgeons: Naomi Gallardo MD Responsible Provider: Kev Desouza MD    Anesthesia Type: MAC ASA Status: 3            Anesthesia Type: No value filed.    Tony Phase I: Tony Score: 10    Tony Phase II:      Anesthesia Post Evaluation    Patient location during evaluation: bedside  Patient participation: complete - patient participated  Level of consciousness: awake and awake and alert  Airway patency: patent  Nausea & Vomiting: no nausea and no vomiting  Cardiovascular status: blood pressure returned to baseline and hemodynamically stable  Respiratory status: acceptable  Hydration status: euvolemic  Pain management: adequate        No notable events documented.

## 2024-07-05 NOTE — PROGRESS NOTES
Pt discharged by wheelchair to [facility].  All personal belongings and discharge instructions taken by patient.

## 2024-07-09 DIAGNOSIS — F33.1 MODERATE EPISODE OF RECURRENT MAJOR DEPRESSIVE DISORDER (HCC): ICD-10-CM

## 2024-07-09 RX ORDER — SERTRALINE HYDROCHLORIDE 25 MG/1
50 TABLET, FILM COATED ORAL DAILY
Qty: 180 TABLET | Refills: 0 | Status: SHIPPED | OUTPATIENT
Start: 2024-07-09

## 2024-08-09 ENCOUNTER — HOSPITAL ENCOUNTER (OUTPATIENT)
Dept: PREADMISSION TESTING | Age: 64
Discharge: HOME | End: 2024-08-13
Payer: COMMERCIAL

## 2024-08-09 VITALS
HEART RATE: 62 BPM | SYSTOLIC BLOOD PRESSURE: 129 MMHG | HEIGHT: 70 IN | TEMPERATURE: 97.6 F | DIASTOLIC BLOOD PRESSURE: 83 MMHG | WEIGHT: 164.6 LBS | OXYGEN SATURATION: 98 % | BODY MASS INDEX: 23.56 KG/M2 | RESPIRATION RATE: 10 BRPM

## 2024-08-09 DIAGNOSIS — I72.3 ANEURYSM OF ILIAC ARTERY (HCC): Primary | ICD-10-CM

## 2024-08-09 LAB
ABO + RH BLD: NORMAL
ANION GAP SERPL CALCULATED.3IONS-SCNC: 11 MEQ/L (ref 9–15)
BLD GP AB SCN SERPL QL: NORMAL
BUN SERPL-MCNC: 11 MG/DL (ref 8–23)
CALCIUM SERPL-MCNC: 9.2 MG/DL (ref 8.5–9.9)
CHLORIDE SERPL-SCNC: 103 MEQ/L (ref 95–107)
CO2 SERPL-SCNC: 23 MEQ/L (ref 20–31)
CREAT SERPL-MCNC: 0.84 MG/DL (ref 0.7–1.2)
ERYTHROCYTE [DISTWIDTH] IN BLOOD BY AUTOMATED COUNT: 14.6 % (ref 11.5–14.5)
GLUCOSE SERPL-MCNC: 107 MG/DL (ref 70–99)
HCT VFR BLD AUTO: 42.9 % (ref 42–52)
HGB BLD-MCNC: 14.6 G/DL (ref 14–18)
MCH RBC QN AUTO: 29.9 PG (ref 27–31.3)
MCHC RBC AUTO-ENTMCNC: 34 % (ref 33–37)
MCV RBC AUTO: 87.9 FL (ref 79–92.2)
PLATELET # BLD AUTO: 216 K/UL (ref 130–400)
POTASSIUM SERPL-SCNC: 4.1 MEQ/L (ref 3.4–4.9)
RBC # BLD AUTO: 4.88 M/UL (ref 4.7–6.1)
SODIUM SERPL-SCNC: 137 MEQ/L (ref 135–144)
WBC # BLD AUTO: 10.2 K/UL (ref 4.8–10.8)

## 2024-08-09 PROCEDURE — 86901 BLOOD TYPING SEROLOGIC RH(D): CPT

## 2024-08-09 PROCEDURE — 86900 BLOOD TYPING SEROLOGIC ABO: CPT

## 2024-08-09 PROCEDURE — 86850 RBC ANTIBODY SCREEN: CPT

## 2024-08-09 PROCEDURE — 85027 COMPLETE CBC AUTOMATED: CPT

## 2024-08-09 PROCEDURE — 80048 BASIC METABOLIC PNL TOTAL CA: CPT

## 2024-08-09 RX ORDER — SODIUM CHLORIDE 9 MG/ML
INJECTION, SOLUTION INTRAVENOUS PRN
Status: CANCELLED | OUTPATIENT
Start: 2024-08-15

## 2024-08-09 RX ORDER — SODIUM CHLORIDE 0.9 % (FLUSH) 0.9 %
5-40 SYRINGE (ML) INJECTION EVERY 12 HOURS SCHEDULED
Status: CANCELLED | OUTPATIENT
Start: 2024-08-15

## 2024-08-09 RX ORDER — LIDOCAINE HYDROCHLORIDE 10 MG/ML
1 INJECTION, SOLUTION EPIDURAL; INFILTRATION; INTRACAUDAL; PERINEURAL
Status: CANCELLED | OUTPATIENT
Start: 2024-08-15 | End: 2024-08-16

## 2024-08-09 RX ORDER — SODIUM CHLORIDE 0.9 % (FLUSH) 0.9 %
5-40 SYRINGE (ML) INJECTION PRN
Status: CANCELLED | OUTPATIENT
Start: 2024-08-15

## 2024-08-09 NOTE — H&P
PRE ADMISSION TESTING    HISTORY AND PHYSICAL EXAM    PATIENT NAME:  Garry Gamez    YOB: 1960  MRN:  [id number]  SERVICE DATE:  8/9/2024     Primary Care Physician: Lizandro Ruffin MD   Surgeon: Dr. Naomi Gallardo    SUBJECTIVE  CHIEF COMPLAINT:  Aneurysm of iliac artery, right     The patient is a 64 y.o. male with significant past medical history of GERD, depression, HTN, HLD, PVD, alcoholism,current tobacco user who presents for a preoperative consultation at the request of surgeon, Dr. Naomi Gallardo, who plans on performing COVERED TUBE STENT FOR COMMON AND EXTERNAL ILIAC ARTERIES, RIGHT on 8/15/24 at [facility].     Patient reports that he was just hospitalized on 7/5/24 for right common iliac artery aneurysm and underwent coiling of right internal iliac artery.  He reports an uneventful post-op course.  He is not having any symptoms currently.  This is the second procedure needed for this repair.  He is walking/resting without any pain or discomfort and trying to keep his activity to a minimum for now.  No issues or concerns at this time.    Patient is current everyday cigarette smoker.        Known Anesthesia Problems: None  Patient Objection to Receiving Blood Products: No  Personal or FH of DVT/PE: Yes - DVT    Medical/Cardiac Clearance Needed: Not Required    ALLERGIES: Patient has no known allergies.    PAST MEDICAL HISTORY:    Past Medical History:   Diagnosis Date    Alcoholism (HCC)     Anxiety     Atherosclerosis of native artery of left lower extremity with intermittent claudication (HCC)     Depression     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)     GERD (gastroesophageal reflux disease)     Hx of blood clots     Hyperlipidemia     Hypertension     PVD (peripheral vascular disease) (HCC)     Spinal stenosis at L4-L5 level      PAST SURGICAL HISTORY:    Past Surgical History:   Procedure Laterality Date    FEMORAL BYPASS Left 01/04/2024    LEFT FEMORAL

## 2024-08-14 ENCOUNTER — ANESTHESIA EVENT (OUTPATIENT)
Dept: OPERATING ROOM | Age: 64
End: 2024-08-14
Payer: COMMERCIAL

## 2024-08-14 DIAGNOSIS — I70.212 ATHEROSCLEROSIS OF NATIVE ARTERY OF LEFT LOWER EXTREMITY WITH INTERMITTENT CLAUDICATION (HCC): ICD-10-CM

## 2024-08-14 RX ORDER — ATORVASTATIN CALCIUM 20 MG/1
20 TABLET, FILM COATED ORAL DAILY
Qty: 90 TABLET | Refills: 0 | Status: SHIPPED | OUTPATIENT
Start: 2024-08-14

## 2024-08-15 ENCOUNTER — ANESTHESIA (OUTPATIENT)
Dept: OPERATING ROOM | Age: 64
End: 2024-08-15
Payer: COMMERCIAL

## 2024-08-15 ENCOUNTER — HOSPITAL ENCOUNTER (INPATIENT)
Age: 64
LOS: 1 days | Discharge: HOME | DRG: 182 | End: 2024-08-16
Attending: SURGERY | Admitting: SURGERY
Payer: COMMERCIAL

## 2024-08-15 ENCOUNTER — APPOINTMENT (OUTPATIENT)
Dept: GENERAL RADIOLOGY | Age: 64
DRG: 182 | End: 2024-08-15
Attending: SURGERY
Payer: COMMERCIAL

## 2024-08-15 DIAGNOSIS — K21.9 GASTROESOPHAGEAL REFLUX DISEASE, UNSPECIFIED WHETHER ESOPHAGITIS PRESENT: ICD-10-CM

## 2024-08-15 PROBLEM — I72.3 ANEURYSM OF INTERNAL ILIAC ARTERY (HCC): Status: ACTIVE | Noted: 2024-08-15

## 2024-08-15 LAB
ANION GAP SERPL CALCULATED.3IONS-SCNC: 11 MEQ/L (ref 9–15)
BUN SERPL-MCNC: 6 MG/DL (ref 8–23)
CALCIUM SERPL-MCNC: 8.7 MG/DL (ref 8.5–9.9)
CHLORIDE SERPL-SCNC: 106 MEQ/L (ref 95–107)
CO2 SERPL-SCNC: 22 MEQ/L (ref 20–31)
CREAT SERPL-MCNC: 0.67 MG/DL (ref 0.7–1.2)
ERYTHROCYTE [DISTWIDTH] IN BLOOD BY AUTOMATED COUNT: 14.8 % (ref 11.5–14.5)
GLUCOSE SERPL-MCNC: 124 MG/DL (ref 70–99)
HCT VFR BLD AUTO: 43.6 % (ref 42–52)
HGB BLD-MCNC: 14.6 G/DL (ref 14–18)
MCH RBC QN AUTO: 29.6 PG (ref 27–31.3)
MCHC RBC AUTO-ENTMCNC: 33.5 % (ref 33–37)
MCV RBC AUTO: 88.3 FL (ref 79–92.2)
PLATELET # BLD AUTO: 208 K/UL (ref 130–400)
POTASSIUM SERPL-SCNC: 4.7 MEQ/L (ref 3.4–4.9)
RBC # BLD AUTO: 4.94 M/UL (ref 4.7–6.1)
SODIUM SERPL-SCNC: 139 MEQ/L (ref 135–144)
WBC # BLD AUTO: 14.6 K/UL (ref 4.8–10.8)

## 2024-08-15 PROCEDURE — 76000 FLUOROSCOPY <1 HR PHYS/QHP: CPT

## 2024-08-15 PROCEDURE — C2628 CATHETER, OCCLUSION: HCPCS | Performed by: SURGERY

## 2024-08-15 PROCEDURE — 6360000002 HC RX W HCPCS: Performed by: NURSE ANESTHETIST, CERTIFIED REGISTERED

## 2024-08-15 PROCEDURE — 2580000003 HC RX 258: Performed by: SURGERY

## 2024-08-15 PROCEDURE — 85027 COMPLETE CBC AUTOMATED: CPT

## 2024-08-15 PROCEDURE — 2580000003 HC RX 258: Performed by: FAMILY MEDICINE

## 2024-08-15 PROCEDURE — C1894 INTRO/SHEATH, NON-LASER: HCPCS | Performed by: SURGERY

## 2024-08-15 PROCEDURE — 6360000004 HC RX CONTRAST MEDICATION: Performed by: SURGERY

## 2024-08-15 PROCEDURE — 6370000000 HC RX 637 (ALT 250 FOR IP): Performed by: SURGERY

## 2024-08-15 PROCEDURE — 3600000004 HC SURGERY LEVEL 4 BASE: Performed by: SURGERY

## 2024-08-15 PROCEDURE — 7100000000 HC PACU RECOVERY - FIRST 15 MIN: Performed by: SURGERY

## 2024-08-15 PROCEDURE — 6360000002 HC RX W HCPCS: Performed by: STUDENT IN AN ORGANIZED HEALTH CARE EDUCATION/TRAINING PROGRAM

## 2024-08-15 PROCEDURE — 3700000000 HC ANESTHESIA ATTENDED CARE: Performed by: SURGERY

## 2024-08-15 PROCEDURE — 99223 1ST HOSP IP/OBS HIGH 75: CPT | Performed by: INTERNAL MEDICINE

## 2024-08-15 PROCEDURE — 3700000001 HC ADD 15 MINUTES (ANESTHESIA): Performed by: SURGERY

## 2024-08-15 PROCEDURE — C1760 CLOSURE DEV, VASC: HCPCS | Performed by: SURGERY

## 2024-08-15 PROCEDURE — 3600000014 HC SURGERY LEVEL 4 ADDTL 15MIN: Performed by: SURGERY

## 2024-08-15 PROCEDURE — 6360000002 HC RX W HCPCS: Performed by: SURGERY

## 2024-08-15 PROCEDURE — 2500000003 HC RX 250 WO HCPCS: Performed by: NURSE ANESTHETIST, CERTIFIED REGISTERED

## 2024-08-15 PROCEDURE — B41D1ZZ FLUOROSCOPY OF AORTA AND BILATERAL LOWER EXTREMITY ARTERIES USING LOW OSMOLAR CONTRAST: ICD-10-PCS | Performed by: SURGERY

## 2024-08-15 PROCEDURE — 2580000003 HC RX 258: Performed by: NURSE ANESTHETIST, CERTIFIED REGISTERED

## 2024-08-15 PROCEDURE — 7100000001 HC PACU RECOVERY - ADDTL 15 MIN: Performed by: SURGERY

## 2024-08-15 PROCEDURE — A4217 STERILE WATER/SALINE, 500 ML: HCPCS | Performed by: SURGERY

## 2024-08-15 PROCEDURE — C1769 GUIDE WIRE: HCPCS | Performed by: SURGERY

## 2024-08-15 PROCEDURE — 2580000003 HC RX 258: Performed by: STUDENT IN AN ORGANIZED HEALTH CARE EDUCATION/TRAINING PROGRAM

## 2024-08-15 PROCEDURE — 04VC3EZ RESTRICTION OF RIGHT COMMON ILIAC ARTERY WITH BRANCHED OR FENESTRATED INTRALUMINAL DEVICE, ONE OR TWO ARTERIES, PERCUTANEOUS APPROACH: ICD-10-PCS | Performed by: SURGERY

## 2024-08-15 PROCEDURE — C1768 GRAFT, VASCULAR: HCPCS | Performed by: SURGERY

## 2024-08-15 PROCEDURE — 2709999900 HC NON-CHARGEABLE SUPPLY: Performed by: SURGERY

## 2024-08-15 PROCEDURE — C1887 CATHETER, GUIDING: HCPCS | Performed by: SURGERY

## 2024-08-15 PROCEDURE — 80048 BASIC METABOLIC PNL TOTAL CA: CPT

## 2024-08-15 PROCEDURE — 6360000002 HC RX W HCPCS: Performed by: INTERNAL MEDICINE

## 2024-08-15 PROCEDURE — 6360000002 HC RX W HCPCS: Performed by: FAMILY MEDICINE

## 2024-08-15 PROCEDURE — 2000000000 HC ICU R&B

## 2024-08-15 PROCEDURE — 6370000000 HC RX 637 (ALT 250 FOR IP)

## 2024-08-15 PROCEDURE — 85347 COAGULATION TIME ACTIVATED: CPT

## 2024-08-15 DEVICE — EXCLUDER AAA ENDO ILIAC EXT 16MMX10MMX7CM 12FR
Type: IMPLANTABLE DEVICE | Site: GROIN | Status: FUNCTIONAL
Brand: GORE EXCLUDER AAA ENDOPROSTHESIS

## 2024-08-15 RX ORDER — PROTAMINE SULFATE 10 MG/ML
INJECTION, SOLUTION INTRAVENOUS PRN
Status: DISCONTINUED | OUTPATIENT
Start: 2024-08-15 | End: 2024-08-15 | Stop reason: SDUPTHER

## 2024-08-15 RX ORDER — CLOPIDOGREL 300 MG/1
300 TABLET, FILM COATED ORAL ONCE
Status: COMPLETED | OUTPATIENT
Start: 2024-08-15 | End: 2024-08-15

## 2024-08-15 RX ORDER — HYDRALAZINE HYDROCHLORIDE 20 MG/ML
10 INJECTION INTRAMUSCULAR; INTRAVENOUS EVERY 6 HOURS PRN
Status: DISCONTINUED | OUTPATIENT
Start: 2024-08-15 | End: 2024-08-16 | Stop reason: HOSPADM

## 2024-08-15 RX ORDER — ONDANSETRON 4 MG/1
4 TABLET, ORALLY DISINTEGRATING ORAL EVERY 8 HOURS PRN
Status: DISCONTINUED | OUTPATIENT
Start: 2024-08-15 | End: 2024-08-16 | Stop reason: HOSPADM

## 2024-08-15 RX ORDER — FENTANYL CITRATE 50 UG/ML
INJECTION, SOLUTION INTRAMUSCULAR; INTRAVENOUS PRN
Status: DISCONTINUED | OUTPATIENT
Start: 2024-08-15 | End: 2024-08-15 | Stop reason: SDUPTHER

## 2024-08-15 RX ORDER — SODIUM CHLORIDE 9 MG/ML
INJECTION, SOLUTION INTRAVENOUS PRN
Status: DISCONTINUED | OUTPATIENT
Start: 2024-08-15 | End: 2024-08-15 | Stop reason: HOSPADM

## 2024-08-15 RX ORDER — SODIUM CHLORIDE, SODIUM LACTATE, POTASSIUM CHLORIDE, CALCIUM CHLORIDE 600; 310; 30; 20 MG/100ML; MG/100ML; MG/100ML; MG/100ML
INJECTION, SOLUTION INTRAVENOUS CONTINUOUS
Status: DISCONTINUED | OUTPATIENT
Start: 2024-08-15 | End: 2024-08-15 | Stop reason: HOSPADM

## 2024-08-15 RX ORDER — MORPHINE SULFATE 2 MG/ML
2 INJECTION, SOLUTION INTRAMUSCULAR; INTRAVENOUS
Status: DISCONTINUED | OUTPATIENT
Start: 2024-08-15 | End: 2024-08-16 | Stop reason: HOSPADM

## 2024-08-15 RX ORDER — LABETALOL HYDROCHLORIDE 5 MG/ML
10 INJECTION, SOLUTION INTRAVENOUS ONCE
Status: DISCONTINUED | OUTPATIENT
Start: 2024-08-15 | End: 2024-08-16 | Stop reason: HOSPADM

## 2024-08-15 RX ORDER — OXYCODONE HYDROCHLORIDE 5 MG/1
5 TABLET ORAL
Status: DISCONTINUED | OUTPATIENT
Start: 2024-08-15 | End: 2024-08-15 | Stop reason: HOSPADM

## 2024-08-15 RX ORDER — ONDANSETRON 2 MG/ML
4 INJECTION INTRAMUSCULAR; INTRAVENOUS
Status: DISCONTINUED | OUTPATIENT
Start: 2024-08-15 | End: 2024-08-15 | Stop reason: HOSPADM

## 2024-08-15 RX ORDER — SODIUM CHLORIDE 0.9 % (FLUSH) 0.9 %
5-40 SYRINGE (ML) INJECTION EVERY 12 HOURS SCHEDULED
Status: DISCONTINUED | OUTPATIENT
Start: 2024-08-15 | End: 2024-08-15 | Stop reason: HOSPADM

## 2024-08-15 RX ORDER — SODIUM CHLORIDE 0.9 % (FLUSH) 0.9 %
5-40 SYRINGE (ML) INJECTION PRN
Status: DISCONTINUED | OUTPATIENT
Start: 2024-08-15 | End: 2024-08-16 | Stop reason: HOSPADM

## 2024-08-15 RX ORDER — GLYCOPYRROLATE 0.2 MG/ML
INJECTION INTRAMUSCULAR; INTRAVENOUS PRN
Status: DISCONTINUED | OUTPATIENT
Start: 2024-08-15 | End: 2024-08-15 | Stop reason: SDUPTHER

## 2024-08-15 RX ORDER — ROCURONIUM BROMIDE 10 MG/ML
INJECTION, SOLUTION INTRAVENOUS PRN
Status: DISCONTINUED | OUTPATIENT
Start: 2024-08-15 | End: 2024-08-15 | Stop reason: SDUPTHER

## 2024-08-15 RX ORDER — SILDENAFIL 50 MG/1
50 TABLET, FILM COATED ORAL DAILY PRN
Status: DISCONTINUED | OUTPATIENT
Start: 2024-08-15 | End: 2024-08-15

## 2024-08-15 RX ORDER — MIDAZOLAM HYDROCHLORIDE 1 MG/ML
INJECTION INTRAMUSCULAR; INTRAVENOUS PRN
Status: DISCONTINUED | OUTPATIENT
Start: 2024-08-15 | End: 2024-08-15 | Stop reason: SDUPTHER

## 2024-08-15 RX ORDER — GABAPENTIN 400 MG/1
400 CAPSULE ORAL 3 TIMES DAILY
Status: DISCONTINUED | OUTPATIENT
Start: 2024-08-15 | End: 2024-08-16 | Stop reason: HOSPADM

## 2024-08-15 RX ORDER — MAGNESIUM HYDROXIDE 1200 MG/15ML
LIQUID ORAL CONTINUOUS PRN
Status: DISCONTINUED | OUTPATIENT
Start: 2024-08-15 | End: 2024-08-15 | Stop reason: HOSPADM

## 2024-08-15 RX ORDER — ASPIRIN 81 MG/1
81 TABLET ORAL DAILY
Status: DISCONTINUED | OUTPATIENT
Start: 2024-08-15 | End: 2024-08-16 | Stop reason: HOSPADM

## 2024-08-15 RX ORDER — LIDOCAINE HYDROCHLORIDE 10 MG/ML
1 INJECTION, SOLUTION EPIDURAL; INFILTRATION; INTRACAUDAL; PERINEURAL
Status: DISCONTINUED | OUTPATIENT
Start: 2024-08-15 | End: 2024-08-15 | Stop reason: HOSPADM

## 2024-08-15 RX ORDER — ONDANSETRON 2 MG/ML
INJECTION INTRAMUSCULAR; INTRAVENOUS PRN
Status: DISCONTINUED | OUTPATIENT
Start: 2024-08-15 | End: 2024-08-15 | Stop reason: SDUPTHER

## 2024-08-15 RX ORDER — HYDRALAZINE HYDROCHLORIDE 20 MG/ML
10 INJECTION INTRAMUSCULAR; INTRAVENOUS ONCE
Status: COMPLETED | OUTPATIENT
Start: 2024-08-15 | End: 2024-08-15

## 2024-08-15 RX ORDER — FENTANYL CITRATE 0.05 MG/ML
25 INJECTION, SOLUTION INTRAMUSCULAR; INTRAVENOUS EVERY 5 MIN PRN
Status: DISCONTINUED | OUTPATIENT
Start: 2024-08-15 | End: 2024-08-15 | Stop reason: HOSPADM

## 2024-08-15 RX ORDER — LIDOCAINE HYDROCHLORIDE 10 MG/ML
INJECTION, SOLUTION EPIDURAL; INFILTRATION; INTRACAUDAL; PERINEURAL PRN
Status: DISCONTINUED | OUTPATIENT
Start: 2024-08-15 | End: 2024-08-15 | Stop reason: SDUPTHER

## 2024-08-15 RX ORDER — METOCLOPRAMIDE HYDROCHLORIDE 5 MG/ML
10 INJECTION INTRAMUSCULAR; INTRAVENOUS
Status: DISCONTINUED | OUTPATIENT
Start: 2024-08-15 | End: 2024-08-15 | Stop reason: HOSPADM

## 2024-08-15 RX ORDER — HEPARIN SODIUM 1000 [USP'U]/ML
INJECTION, SOLUTION INTRAVENOUS; SUBCUTANEOUS PRN
Status: DISCONTINUED | OUTPATIENT
Start: 2024-08-15 | End: 2024-08-15 | Stop reason: SDUPTHER

## 2024-08-15 RX ORDER — PANTOPRAZOLE SODIUM 40 MG/1
40 TABLET, DELAYED RELEASE ORAL
Status: DISCONTINUED | OUTPATIENT
Start: 2024-08-16 | End: 2024-08-16 | Stop reason: HOSPADM

## 2024-08-15 RX ORDER — ONDANSETRON 2 MG/ML
4 INJECTION INTRAMUSCULAR; INTRAVENOUS EVERY 6 HOURS PRN
Status: DISCONTINUED | OUTPATIENT
Start: 2024-08-15 | End: 2024-08-16 | Stop reason: HOSPADM

## 2024-08-15 RX ORDER — ATORVASTATIN CALCIUM 20 MG/1
20 TABLET, FILM COATED ORAL DAILY
Status: DISCONTINUED | OUTPATIENT
Start: 2024-08-15 | End: 2024-08-16 | Stop reason: HOSPADM

## 2024-08-15 RX ORDER — SODIUM CHLORIDE 9 MG/ML
INJECTION, SOLUTION INTRAVENOUS PRN
Status: DISCONTINUED | OUTPATIENT
Start: 2024-08-15 | End: 2024-08-15

## 2024-08-15 RX ORDER — CLOPIDOGREL BISULFATE 75 MG/1
75 TABLET ORAL DAILY
Status: DISCONTINUED | OUTPATIENT
Start: 2024-08-16 | End: 2024-08-16 | Stop reason: HOSPADM

## 2024-08-15 RX ORDER — PROPOFOL 10 MG/ML
INJECTION, EMULSION INTRAVENOUS PRN
Status: DISCONTINUED | OUTPATIENT
Start: 2024-08-15 | End: 2024-08-15 | Stop reason: SDUPTHER

## 2024-08-15 RX ORDER — DEXAMETHASONE SODIUM PHOSPHATE 10 MG/ML
INJECTION INTRAMUSCULAR; INTRAVENOUS PRN
Status: DISCONTINUED | OUTPATIENT
Start: 2024-08-15 | End: 2024-08-15 | Stop reason: SDUPTHER

## 2024-08-15 RX ORDER — SODIUM CHLORIDE 0.9 % (FLUSH) 0.9 %
5-40 SYRINGE (ML) INJECTION PRN
Status: DISCONTINUED | OUTPATIENT
Start: 2024-08-15 | End: 2024-08-15 | Stop reason: HOSPADM

## 2024-08-15 RX ORDER — LABETALOL HYDROCHLORIDE 5 MG/ML
10 INJECTION, SOLUTION INTRAVENOUS
Status: DISCONTINUED | OUTPATIENT
Start: 2024-08-15 | End: 2024-08-16 | Stop reason: HOSPADM

## 2024-08-15 RX ORDER — SODIUM CHLORIDE 9 MG/ML
INJECTION, SOLUTION INTRAVENOUS PRN
Status: DISCONTINUED | OUTPATIENT
Start: 2024-08-15 | End: 2024-08-16 | Stop reason: HOSPADM

## 2024-08-15 RX ORDER — SODIUM CHLORIDE 9 MG/ML
INJECTION, SOLUTION INTRAVENOUS CONTINUOUS
Status: DISCONTINUED | OUTPATIENT
Start: 2024-08-15 | End: 2024-08-15

## 2024-08-15 RX ORDER — ACETAMINOPHEN 325 MG/1
650 TABLET ORAL
Status: DISCONTINUED | OUTPATIENT
Start: 2024-08-15 | End: 2024-08-15 | Stop reason: HOSPADM

## 2024-08-15 RX ORDER — NALOXONE HYDROCHLORIDE 0.4 MG/ML
INJECTION, SOLUTION INTRAMUSCULAR; INTRAVENOUS; SUBCUTANEOUS PRN
Status: DISCONTINUED | OUTPATIENT
Start: 2024-08-15 | End: 2024-08-15 | Stop reason: HOSPADM

## 2024-08-15 RX ORDER — ALBUTEROL SULFATE 90 UG/1
2 AEROSOL, METERED RESPIRATORY (INHALATION) EVERY 6 HOURS PRN
Status: DISCONTINUED | OUTPATIENT
Start: 2024-08-15 | End: 2024-08-16 | Stop reason: HOSPADM

## 2024-08-15 RX ORDER — IOPAMIDOL 612 MG/ML
INJECTION, SOLUTION INTRATHECAL PRN
Status: DISCONTINUED | OUTPATIENT
Start: 2024-08-15 | End: 2024-08-15 | Stop reason: HOSPADM

## 2024-08-15 RX ORDER — SODIUM CHLORIDE 0.9 % (FLUSH) 0.9 %
5-40 SYRINGE (ML) INJECTION EVERY 12 HOURS SCHEDULED
Status: DISCONTINUED | OUTPATIENT
Start: 2024-08-15 | End: 2024-08-16 | Stop reason: HOSPADM

## 2024-08-15 RX ORDER — LOSARTAN POTASSIUM 50 MG/1
25 TABLET ORAL DAILY
Status: DISCONTINUED | OUTPATIENT
Start: 2024-08-15 | End: 2024-08-16 | Stop reason: HOSPADM

## 2024-08-15 RX ADMIN — SODIUM CHLORIDE, POTASSIUM CHLORIDE, SODIUM LACTATE AND CALCIUM CHLORIDE: 600; 310; 30; 20 INJECTION, SOLUTION INTRAVENOUS at 07:40

## 2024-08-15 RX ADMIN — PROPOFOL 150 MG: 10 INJECTION, EMULSION INTRAVENOUS at 07:44

## 2024-08-15 RX ADMIN — LOSARTAN POTASSIUM 25 MG: 50 TABLET, FILM COATED ORAL at 11:28

## 2024-08-15 RX ADMIN — ONDANSETRON 4 MG: 2 INJECTION INTRAMUSCULAR; INTRAVENOUS at 07:56

## 2024-08-15 RX ADMIN — SODIUM CHLORIDE, POTASSIUM CHLORIDE, SODIUM LACTATE AND CALCIUM CHLORIDE: 600; 310; 30; 20 INJECTION, SOLUTION INTRAVENOUS at 08:47

## 2024-08-15 RX ADMIN — ATORVASTATIN CALCIUM 20 MG: 20 TABLET, FILM COATED ORAL at 16:49

## 2024-08-15 RX ADMIN — HYDRALAZINE HYDROCHLORIDE 10 MG: 20 INJECTION INTRAMUSCULAR; INTRAVENOUS at 09:53

## 2024-08-15 RX ADMIN — SODIUM CHLORIDE, POTASSIUM CHLORIDE, SODIUM LACTATE AND CALCIUM CHLORIDE: 600; 310; 30; 20 INJECTION, SOLUTION INTRAVENOUS at 08:14

## 2024-08-15 RX ADMIN — DEXAMETHASONE SODIUM PHOSPHATE 10 MG: 10 INJECTION INTRAMUSCULAR; INTRAVENOUS at 07:56

## 2024-08-15 RX ADMIN — FENTANYL CITRATE 100 MCG: 50 INJECTION, SOLUTION INTRAMUSCULAR; INTRAVENOUS at 08:44

## 2024-08-15 RX ADMIN — CEFAZOLIN 2000 MG: 2 INJECTION, POWDER, FOR SOLUTION INTRAMUSCULAR; INTRAVENOUS at 07:52

## 2024-08-15 RX ADMIN — LIDOCAINE HYDROCHLORIDE 25 MG: 10 INJECTION, SOLUTION EPIDURAL; INFILTRATION; INTRACAUDAL; PERINEURAL at 07:44

## 2024-08-15 RX ADMIN — PROTAMINE SULFATE 40 MG: 10 INJECTION, SOLUTION INTRAVENOUS at 08:59

## 2024-08-15 RX ADMIN — ONDANSETRON 4 MG: 2 INJECTION INTRAMUSCULAR; INTRAVENOUS at 22:29

## 2024-08-15 RX ADMIN — SODIUM CHLORIDE, POTASSIUM CHLORIDE, SODIUM LACTATE AND CALCIUM CHLORIDE: 600; 310; 30; 20 INJECTION, SOLUTION INTRAVENOUS at 06:36

## 2024-08-15 RX ADMIN — MIDAZOLAM HYDROCHLORIDE 2 MG: 1 INJECTION, SOLUTION INTRAMUSCULAR; INTRAVENOUS at 07:36

## 2024-08-15 RX ADMIN — GABAPENTIN 400 MG: 400 CAPSULE ORAL at 16:49

## 2024-08-15 RX ADMIN — HEPARIN SODIUM 10000 UNITS: 1000 INJECTION INTRAVENOUS; SUBCUTANEOUS at 08:31

## 2024-08-15 RX ADMIN — HYDRALAZINE HYDROCHLORIDE 10 MG: 20 INJECTION INTRAMUSCULAR; INTRAVENOUS at 16:49

## 2024-08-15 RX ADMIN — PHENYLEPHRINE HYDROCHLORIDE 30 MCG/MIN: 10 INJECTION INTRAVENOUS at 08:15

## 2024-08-15 RX ADMIN — MORPHINE SULFATE 2 MG: 2 INJECTION, SOLUTION INTRAMUSCULAR; INTRAVENOUS at 22:25

## 2024-08-15 RX ADMIN — FENTANYL CITRATE 100 MCG: 50 INJECTION, SOLUTION INTRAMUSCULAR; INTRAVENOUS at 07:44

## 2024-08-15 RX ADMIN — GLYCOPYRROLATE 0.2 MG: 0.2 INJECTION INTRAMUSCULAR; INTRAVENOUS at 07:56

## 2024-08-15 RX ADMIN — ROCURONIUM BROMIDE 50 MG: 10 INJECTION, SOLUTION INTRAVENOUS at 07:44

## 2024-08-15 RX ADMIN — CLOPIDOGREL BISULFATE 300 MG: 300 TABLET, FILM COATED ORAL at 10:33

## 2024-08-15 RX ADMIN — SODIUM CHLORIDE: 9 INJECTION, SOLUTION INTRAVENOUS at 11:15

## 2024-08-15 RX ADMIN — CEFAZOLIN 2000 MG: 2 INJECTION, POWDER, FOR SOLUTION INTRAMUSCULAR; INTRAVENOUS at 16:43

## 2024-08-15 RX ADMIN — GABAPENTIN 400 MG: 400 CAPSULE ORAL at 20:35

## 2024-08-15 RX ADMIN — SERTRALINE 50 MG: 50 TABLET, FILM COATED ORAL at 16:49

## 2024-08-15 RX ADMIN — Medication 10 ML: at 20:35

## 2024-08-15 RX ADMIN — PROPOFOL 200 MG: 10 INJECTION, EMULSION INTRAVENOUS at 08:43

## 2024-08-15 RX ADMIN — GLYCOPYRROLATE 0.2 MG: 0.2 INJECTION INTRAMUSCULAR; INTRAVENOUS at 08:22

## 2024-08-15 ASSESSMENT — PAIN DESCRIPTION - LOCATION
LOCATION: BACK
LOCATION: HEAD

## 2024-08-15 ASSESSMENT — PAIN SCALES - GENERAL
PAINLEVEL_OUTOF10: 1
PAINLEVEL_OUTOF10: 5
PAINLEVEL_OUTOF10: 1
PAINLEVEL_OUTOF10: 1
PAINLEVEL_OUTOF10: 3
PAINLEVEL_OUTOF10: 1
PAINLEVEL_OUTOF10: 1
PAINLEVEL_OUTOF10: 0
PAINLEVEL_OUTOF10: 7
PAINLEVEL_OUTOF10: 1
PAINLEVEL_OUTOF10: 0
PAINLEVEL_OUTOF10: 1

## 2024-08-15 ASSESSMENT — PAIN DESCRIPTION - ORIENTATION
ORIENTATION: LOWER

## 2024-08-15 ASSESSMENT — LIFESTYLE VARIABLES: SMOKING_STATUS: 1

## 2024-08-15 NOTE — PROGRESS NOTES
Pt urinated on own 150cc clear yellow urine, bladderscanned for 679ml in bladder, pt states he still is urinating but slowly

## 2024-08-15 NOTE — ANESTHESIA PRE PROCEDURE
Department of Anesthesiology  Preprocedure Note       Name:  Garry Gamez   Age:  64 y.o.  :  1960                                          MRN:  [id number]         Date:  8/15/2024      Surgeon: Surgeon(s):  Naomi Gallardo MD    Procedure: Procedure(s):  COVERED TUBE STENT FOR COMMON AND EXTERNAL ILIAC ARTERIES RIGHT, C-ARM,  INTRAVASCULAR ULTRASOUND/ EMAILED WOJCIECH AWARE/ RAISA BOJORQUEZ AWARE    Medications prior to admission:   Prior to Admission medications    Medication Sig Start Date End Date Taking? Authorizing Provider   atorvastatin (LIPITOR) 20 MG tablet TAKE 1 TABLET BY MOUTH ONCE DAILY 24  Yes Lizandro Ruffin MD   sertraline (ZOLOFT) 25 MG tablet Take 2 tablets by mouth daily 24  Yes Lizandro Ruffin MD   omeprazole (PRILOSEC) 40 MG delayed release capsule Take 1 capsule by mouth every morning (before breakfast) 24  Yes Lizandro Ruffin MD   losartan (COZAAR) 25 MG tablet TAKE 1 TABLET BY MOUTH ONCE DAILY 24  Yes Lizandro Ruffin MD   gabapentin (NEURONTIN) 400 MG capsule Take 1 capsule by mouth 3 times daily for 90 days. 5/14/24 8/15/24 Yes Lizandro Ruffin MD   albuterol sulfate HFA (VENTOLIN HFA) 108 (90 Base) MCG/ACT inhaler Inhale 2 puffs into the lungs every 6 hours as needed for Wheezing 2/15/24  Yes Lizandro Ruffin MD   ASPIRIN 81 PO Take by mouth daily   Yes Provider, MD Jay   sildenafil (VIAGRA) 50 MG tablet Take 1 tablet by mouth daily as needed for Erectile Dysfunction 3/14/24   Lizandro Ruffin MD   Multiple Vitamin (MULTIVITAMIN) TABS tablet Take 1 tablet by mouth daily  Patient not taking: Reported on 8/15/2024 2/15/22   John Cordero MD       Current medications:    Current Facility-Administered Medications   Medication Dose Route Frequency Provider Last Rate Last Admin    lactated ringers IV soln infusion   IntraVENous Continuous Loretta Ribeiro  mL/hr at 08/15/24 0636 New Bag at 08/15/24 0636

## 2024-08-15 NOTE — PROGRESS NOTES
Called pharmacy still awaiting for  losartan     1030 Lab at [facility] for blood draw

## 2024-08-15 NOTE — OP NOTE
Operative Note      Patient: Garry Gamez  YOB: 1960  MRN: [id number]    Date of Procedure: 8/15/2024    Preop diagnosis:  Saccular right common iliac aneurysm I72.3    Post-Op Diagnosis: Same    Procedure:  1.  Endovascular repair of saccular right common iliac artery aneurysm with [place] excluder limb measuring 10 mm x 7 cm  2.  Percutaneous closure of right common femoral artery arteriotomy with Perclose ProGlide device       Surgeon(s):  Naomi Gallardo MD    Assistant:   First Assistant: Cherie Joseph    Anesthesia: General    Estimated Blood Loss (mL): Minimal    Complications: None    Specimens:   * No specimens in log *    Implants:  Implant Name Type Inv. Item Serial No.  Lot No. LRB No. Used Action   GRAFT ENDOVASC L7CM [id number] IL EXT W/ C3 DEL SYS FOR AAA - [id number] Endografts GRAFT ENDOVASC L7CM [id number] IL EXT W/ C3 DEL SYS FOR AAA [id number] [employer]  Right 1 Implanted         Drains: * No LDAs found *    Findings:  Infection Present At Time Of Surgery (PATOS) (choose all levels that have infection present):  No infection present  Other Findings: Saccular right common iliac artery aneurysm    Indications for procedure:  Patient is a 64-year-old male with a history of a saccular right common iliac artery aneurysm.  He is status post coiling of the right internal iliac artery in preparation for covered stent graft of his saccular right common iliac artery aneurysm.  The risks and benefits of the procedure were discussed with the patient, and the patient has agreed to consent to the procedure.  Risks including but not limited to bleeding, infection, pain, limb loss, MI, and death were discussed with the patient.    Detailed Description of Procedure:   After informed consent was obtained from the patient, the patient was taken to the operative room and prepped and draped in the usual sterile fashion and placed in the supine

## 2024-08-15 NOTE — PROGRESS NOTES
Used 10 fr catheter to straight cath, pt voiding clear yellow urine Lilliam BARILLAS assisted, see I/O

## 2024-08-15 NOTE — PROGRESS NOTES
1128 hob elevated 30 degrees per order    1217 pt used urnal and voided 100cc clear yellow urine see I/O    1219 assessment reassessed and no changes, pt resting comfortably, awaiting icu bed at this time

## 2024-08-15 NOTE — PROGRESS NOTES
Spoke with Dr Ribeiro re: elevated bp 207/188 after hydralazine given, ordered labetolol 10mg iv once

## 2024-08-15 NOTE — ANESTHESIA POSTPROCEDURE EVALUATION
Department of Anesthesiology  Postprocedure Note    Patient: Garry Gamez  MRN: [id number]  YOB: 1960  Date of evaluation: 8/15/2024    Procedure Summary       Date: 08/15/24 Room / Location: [address]    Anesthesia Start: 0739 Anesthesia Stop: 0922    Procedure: COVERED TUBE STENT FOR COMMON AND EXTERNAL ILIAC ARTERIES RIGHT. (Right: Groin) Diagnosis:       Aneurysm of right iliac artery (HCC)      (Aneurysm of right iliac artery (HCC) [I72.3])    Surgeons: Naomi Gallardo MD Responsible Provider: Loretta Ribeiro MD    Anesthesia Type: general ASA Status: 3            Anesthesia Type: No value filed.    Tony Phase I:      Tony Phase II:      Anesthesia Post Evaluation    Patient location during evaluation: bedside  Patient participation: complete - patient participated  Level of consciousness: awake  Airway patency: patent  Nausea & Vomiting: no nausea and no vomiting  Cardiovascular status: blood pressure returned to baseline  Respiratory status: acceptable  Hydration status: euvolemic  Pain management: adequate        No notable events documented.

## 2024-08-15 NOTE — PROGRESS NOTES
Patient's bedrest complete. Right groin site stable dressing intact.Patient awaiting in patient bed.

## 2024-08-15 NOTE — ANESTHESIA PROCEDURE NOTES
Arterial Line:    An arterial line was placed in the pre-op for the following indication(s): continuous blood pressure monitoring and blood sampling needed.    A 20 gauge (size), 1 and 3/4 inch (length), Arrow (type) catheter was placed, Seldinger technique used, into the left radial artery, secured by tape and Tegaderm.  Anesthesia type: Local    Events:  patient tolerated procedure well with no complications.8/15/2024 7:10 AM8/15/2024 7:23 AM  Performed: Resident/CRNA   Preanesthetic Checklist  Completed: patient identified, IV checked, site marked, risks and benefits discussed, surgical/procedural consents, equipment checked, pre-op evaluation, timeout performed, anesthesia consent given, oxygen available, monitors applied/VS acknowledged, fire risk safety assessment completed and verbalized and blood product R/B/A discussed and consented

## 2024-08-15 NOTE — INTERVAL H&P NOTE
Update History & Physical    The patient's History and Physical of August 9, 2024 was reviewed with the patient and I examined the patient. There was no change. The surgical site was confirmed by the patient and me.     Plan:  Right common iliac aneurysm repair with endograft. The risks, benefits, expected outcome, and alternative to the recommended procedure have been discussed with the patient. Patient understands and wants to proceed with the procedure.     Electronically signed by Naomi Gallardo MD on 8/15/2024 at 7:39 AM

## 2024-08-15 NOTE — CONSULTS
Pulmonary and Critical Care Medicine  Consult Note  Encounter Date: 8/15/2024 4:47 PM    Mr. Garry Gamez is a 64 y.o. male  : 1960  Requesting Provider: Naomi Gallardo MD    Reason for request: ICU management            HISTORY OF PRESENT ILLNESS:    Patient is 64 y.o. presents with endovascular repair of right common iliac artery aneurysm.  Doing good, has no complaint, his blood pressure is high but no chest pain, no shortness of breath, no headache, no blurry vision, no tingling or numbness.  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 SYNTHETIC BYPASS GRAPH  T&S ON ARRIVAL AND ALL OTHER LABS & H&P IN SYSTEM  C-ARM performed by Naomi Gallardo MD at [facility] [place] OR    TONSILLECTOMY      VASCULAR SURGERY Right 2024    RIGHT INTERNAL ILIAC ARTERY COILING, RIGHT GROIN APPROACH. performed by Naomi Gallardo MD at [facility] [place] OR       Social History:     reports that he has been smoking cigarettes. He has a 20 pack-year smoking history. He has never been exposed to tobacco smoke. He has never used smokeless tobacco. He reports that he does not currently use alcohol. He reports current drug use. Frequency: 7.00 times per week. Drug: Marijuana (Weed).    Family History:       Problem Relation Age of Onset    Heart Disease Father        Allergies:  Patient has no known allergies.        MEDICATIONS during current hospitalization:    Continuous

## 2024-08-15 NOTE — PROGRESS NOTES
0920 pt to pacu 03 from OR, pt supine, report given by jaden BARILLAS and Gurpreet LUIS, pt placed on monitor, assessment completed    0940 called lab for blood draw and released orders    0950 Dr Ribeiro at [facility] aware of elevated bp per dr Ribeiro art line not working well and ok to dc, hydralazine ordered    1005 art line dc'd

## 2024-08-16 VITALS
WEIGHT: 164 LBS | HEART RATE: 70 BPM | RESPIRATION RATE: 17 BRPM | HEIGHT: 70 IN | TEMPERATURE: 97.8 F | BODY MASS INDEX: 23.48 KG/M2 | OXYGEN SATURATION: 97 % | DIASTOLIC BLOOD PRESSURE: 93 MMHG | SYSTOLIC BLOOD PRESSURE: 154 MMHG

## 2024-08-16 LAB
ANION GAP SERPL CALCULATED.3IONS-SCNC: 11 MEQ/L (ref 9–15)
BLOOD BANK DISPENSE STATUS: NORMAL
BLOOD BANK DISPENSE STATUS: NORMAL
BLOOD BANK PRODUCT CODE: NORMAL
BLOOD BANK PRODUCT CODE: NORMAL
BPU ID: NORMAL
BPU ID: NORMAL
BUN SERPL-MCNC: 7 MG/DL (ref 8–23)
CALCIUM SERPL-MCNC: 8.7 MG/DL (ref 8.5–9.9)
CHLORIDE SERPL-SCNC: 101 MEQ/L (ref 95–107)
CO2 SERPL-SCNC: 22 MEQ/L (ref 20–31)
CREAT SERPL-MCNC: 0.64 MG/DL (ref 0.7–1.2)
DESCRIPTION BLOOD BANK: NORMAL
DESCRIPTION BLOOD BANK: NORMAL
ERYTHROCYTE [DISTWIDTH] IN BLOOD BY AUTOMATED COUNT: 14.9 % (ref 11.5–14.5)
GLUCOSE SERPL-MCNC: 110 MG/DL (ref 70–99)
HCT VFR BLD AUTO: 40 % (ref 42–52)
HGB BLD-MCNC: 13.7 G/DL (ref 14–18)
MCH RBC QN AUTO: 29.9 PG (ref 27–31.3)
MCHC RBC AUTO-ENTMCNC: 34.3 % (ref 33–37)
MCV RBC AUTO: 87.3 FL (ref 79–92.2)
PLATELET # BLD AUTO: 198 K/UL (ref 130–400)
POC ACT LR: 146 SEC
POC ACT LR: 226 SEC
POC ACT LR: 320 SEC
POTASSIUM SERPL-SCNC: 3.9 MEQ/L (ref 3.4–4.9)
RBC # BLD AUTO: 4.58 M/UL (ref 4.7–6.1)
SODIUM SERPL-SCNC: 134 MEQ/L (ref 135–144)
WBC # BLD AUTO: 18.8 K/UL (ref 4.8–10.8)

## 2024-08-16 PROCEDURE — 6370000000 HC RX 637 (ALT 250 FOR IP): Performed by: SURGERY

## 2024-08-16 PROCEDURE — 80048 BASIC METABOLIC PNL TOTAL CA: CPT

## 2024-08-16 PROCEDURE — 6360000002 HC RX W HCPCS: Performed by: SURGERY

## 2024-08-16 PROCEDURE — 36415 COLL VENOUS BLD VENIPUNCTURE: CPT

## 2024-08-16 PROCEDURE — 2580000003 HC RX 258: Performed by: SURGERY

## 2024-08-16 PROCEDURE — 6360000002 HC RX W HCPCS: Performed by: INTERNAL MEDICINE

## 2024-08-16 PROCEDURE — 6370000000 HC RX 637 (ALT 250 FOR IP): Performed by: INTERNAL MEDICINE

## 2024-08-16 PROCEDURE — 99232 SBSQ HOSP IP/OBS MODERATE 35: CPT | Performed by: INTERNAL MEDICINE

## 2024-08-16 PROCEDURE — 85027 COMPLETE CBC AUTOMATED: CPT

## 2024-08-16 RX ORDER — CLOPIDOGREL BISULFATE 75 MG/1
75 TABLET ORAL DAILY
Qty: 30 TABLET | Refills: 3 | Status: SHIPPED | OUTPATIENT
Start: 2024-08-17

## 2024-08-16 RX ORDER — AMLODIPINE BESYLATE 5 MG/1
5 TABLET ORAL DAILY
Qty: 30 TABLET | Refills: 0 | Status: SHIPPED | OUTPATIENT
Start: 2024-08-17

## 2024-08-16 RX ORDER — PANTOPRAZOLE SODIUM 40 MG/1
40 TABLET, DELAYED RELEASE ORAL
Qty: 30 TABLET | Refills: 3 | Status: SHIPPED | OUTPATIENT
Start: 2024-08-17

## 2024-08-16 RX ORDER — AMLODIPINE BESYLATE 5 MG/1
5 TABLET ORAL DAILY
Status: DISCONTINUED | OUTPATIENT
Start: 2024-08-16 | End: 2024-08-16 | Stop reason: HOSPADM

## 2024-08-16 RX ADMIN — LOSARTAN POTASSIUM 25 MG: 50 TABLET, FILM COATED ORAL at 08:23

## 2024-08-16 RX ADMIN — HYDRALAZINE HYDROCHLORIDE 10 MG: 20 INJECTION INTRAMUSCULAR; INTRAVENOUS at 08:22

## 2024-08-16 RX ADMIN — ATORVASTATIN CALCIUM 20 MG: 20 TABLET, FILM COATED ORAL at 08:23

## 2024-08-16 RX ADMIN — CEFAZOLIN 2000 MG: 2 INJECTION, POWDER, FOR SOLUTION INTRAMUSCULAR; INTRAVENOUS at 00:08

## 2024-08-16 RX ADMIN — GABAPENTIN 400 MG: 400 CAPSULE ORAL at 08:23

## 2024-08-16 RX ADMIN — ASPIRIN 81 MG: 81 TABLET, COATED ORAL at 08:23

## 2024-08-16 RX ADMIN — AMLODIPINE BESYLATE 5 MG: 5 TABLET ORAL at 10:32

## 2024-08-16 RX ADMIN — Medication 10 ML: at 08:41

## 2024-08-16 RX ADMIN — GABAPENTIN 400 MG: 400 CAPSULE ORAL at 15:27

## 2024-08-16 RX ADMIN — CLOPIDOGREL BISULFATE 75 MG: 75 TABLET ORAL at 08:23

## 2024-08-16 RX ADMIN — CEFAZOLIN 2000 MG: 2 INJECTION, POWDER, FOR SOLUTION INTRAMUSCULAR; INTRAVENOUS at 08:31

## 2024-08-16 RX ADMIN — SERTRALINE 50 MG: 50 TABLET, FILM COATED ORAL at 08:23

## 2024-08-16 RX ADMIN — PANTOPRAZOLE SODIUM 40 MG: 40 TABLET, DELAYED RELEASE ORAL at 06:57

## 2024-08-16 NOTE — CARE COORDINATION
This LSW attempted to meet with patient, he was sleeping.  Electronically signed by ROSA Chaparro on 8/16/24 at 2:42 PM EDT

## 2024-08-16 NOTE — PLAN OF CARE
Problem: Discharge Planning  Goal: Discharge to home or other facility with appropriate resources  Outcome: Progressing     Problem: Pain  Goal: Verbalizes/displays adequate comfort level or baseline comfort level  Outcome: Progressing  Flowsheets (Taken 8/15/2024 2000)  Verbalizes/displays adequate comfort level or baseline comfort level:   Assess pain using appropriate pain scale   Administer analgesics based on type and severity of pain and evaluate response   Encourage patient to monitor pain and request assistance     Problem: Safety - Adult  Goal: Free from fall injury  Outcome: Progressing     Problem: ABCDS Injury Assessment  Goal: Absence of physical injury  Outcome: Progressing

## 2024-08-16 NOTE — FLOWSHEET NOTE
Discharge instructions reviewed with pt ,all questions answered. All belongings sent with pt.Friend to transport pt home via car.Electronically signed by Jada Canela RN on 8/16/2024 at 4:44 PM

## 2024-08-16 NOTE — PROGRESS NOTES
19:00-07:30 Shift Summary   Assessments completed (see flowsheets). Pt A&Ox4, pleasant and cooperative with staff, able to make needs and wants known. IV medications infused per orders (see emar). Dressing to R groin incision remains C/D/I. BLE remain warm with +2 pedal pulses. Labs drawn by . No s/s of distress noted throughout shift. Bedside handoff report given to oncoming RN. Safety measures in place.

## 2024-08-16 NOTE — DISCHARGE SUMMARY
Discharge Summary    Date: 8/16/2024  Patient Name: Garry Gamez    YOB: 1960     Age: 64 y.o.    Admit Date: 8/15/2024  Discharge Date: 8/16/2024  Discharge Condition: Good    Admission Diagnosis  Aneurysm of right iliac artery (HCC) [I72.3];Aneurysm of internal iliac artery (HCC) [I72.3]      Discharge Diagnosis  Principal Problem:    Aneurysm of internal iliac artery (HCC)  Resolved Problems:    * No resolved hospital problems. *      Hospital Stay  Narrative of Hospital Course:  Patient underwent repair of a saccular right common iliac artery aneurysm via endograft.  Patient was admitted to the ICU for monitoring.  Patient did well overnight and was discharged home on 8/16/2024.    Consultants:  IP CONSULT TO CRITICAL CARE    Surgeries/procedures Performed:  As above     Treatments:    Surgery        Discharge Plan/Disposition:  Home    Hospital/Incidental Findings Requiring Follow Up:    Patient Instructions:    Diet:    Activity:No Lifting, Driving or Strenuous Excercise  For number of days (if applicable): 40      Other Instructions:    Provider Follow-Up:   No follow-ups on file.     Significant Diagnostic Studies:    Recent Labs:  Admission on 08/15/2024  WBC                                           Date: 08/15/2024  Value: 14.6 (H)    Ref range: 4.8 - 10.8 K/uL    Status: Final  RBC                                           Date: 08/15/2024  Value: 4.94        Ref range: 4.70 - 6.10 M/uL   Status: Final  Hemoglobin                                    Date: 08/15/2024  Value: 14.6        Ref range: 14.0 - 18.0 g/dL   Status: Final  Hematocrit                                    Date: 08/15/2024  Value: 43.6        Ref range: 42.0 - 52.0 %      Status: Final  MCV                                           Date: 08/15/2024  Value: 88.3        Ref range: 79.0 - 92.2 fL     Status: Final  MCH                                           Date: 08/15/2024  Value: 29.6        Ref range: 27.0 - 31.3 pg               Comment: Performed on POC  ------------    Radiology last 7 days:  No results found.     [unfilled]    Discharge Medications    Current Discharge Medication List    START taking these medications    amLODIPine (NORVASC) 5 MG tablet  Take 1 tablet by mouth daily  Qty: 30 tablet Refills: 0    clopidogrel (PLAVIX) 75 MG tablet  Take 1 tablet by mouth daily  Qty: 30 tablet Refills: 3          Current Discharge Medication List        Current Discharge Medication List    CONTINUE these medications which have NOT CHANGED    atorvastatin (LIPITOR) 20 MG tablet  TAKE 1 TABLET BY MOUTH ONCE DAILY  Qty: 90 tablet Refills: 0  Associated Diagnoses:Atherosclerosis of native artery of left lower extremity with intermittent claudication (HCC)    sertraline (ZOLOFT) 25 MG tablet  Take 2 tablets by mouth daily  Qty: 180 tablet Refills: 0  Associated Diagnoses:Moderate episode of recurrent major depressive disorder (HCC)    omeprazole (PRILOSEC) 40 MG delayed release capsule  Take 1 capsule by mouth every morning (before breakfast)  Qty: 90 capsule Refills: 1  Associated Diagnoses:Gastroesophageal reflux disease, unspecified whether esophagitis present    losartan (COZAAR) 25 MG tablet  TAKE 1 TABLET BY MOUTH ONCE DAILY  Qty: 90 tablet Refills: 0  Associated Diagnoses:Essential (primary) hypertension    gabapentin (NEURONTIN) 400 MG capsule  Take 1 capsule by mouth 3 times daily for 90 days.  Qty: 270 capsule Refills: 0  Associated Diagnoses:Spinal stenosis at L4-L5 level    albuterol sulfate HFA (VENTOLIN HFA) 108 (90 Base) MCG/ACT inhaler  Inhale 2 puffs into the lungs every 6 hours as needed for Wheezing  Qty: 18 g Refills: 3  Associated Diagnoses:Acute upper respiratory infection, unspecified    ASPIRIN 81 PO  Take by mouth daily    sildenafil (VIAGRA) 50 MG tablet  Take 1 tablet by mouth daily as needed for Erectile Dysfunction  Qty: 20 tablet Refills: 0  Associated Diagnoses:Erectile dysfunction, unspecified

## 2024-08-16 NOTE — INTERDISCIPLINARY ROUNDS
Spiritual Care Services     Summary of Visit:     presence as member of Interdisciplinary Rounds team.  Patient on room air, no family present.  Patient is alert and oriented and plans for D/C are progressing.      Encounter Summary  Encounter Overview/Reason: Interdisciplinary rounds  Service Provided For: Patient  Support System: Unknown  Complexity of Encounter: Low  Begin Time: 0940  End Time : 0950  Total Time Calculated: 10 min                               Spiritual Assessment/Intervention/Outcomes:    Assessment: Unable to assess                Care Plan:         Spiritual care available upon request.   will follow up with patient tomorrow, if patient remains in hospital.        Spiritual Care Services   Electronically signed by ALPESH Dillon on 8/16/2024 at 10:59 AM.    To reach a  for emotional and spiritual support, place an EPIC consult request.   If a  is needed immediately, dial “0” and ask to page the on-call .

## 2024-08-16 NOTE — CARE COORDINATION
Case Management Assessment  Initial Evaluation    Date/Time of Evaluation: 8/16/2024 3:35 PM  Assessment Completed by: ROSA Chaparro    If patient is discharged prior to next notation, then this note serves as note for discharge by case management.    Patient Name: Garry Gamez                   YOB: 1960  Diagnosis: Aneurysm of right iliac artery (HCC) [I72.3]  Aneurysm of internal iliac artery (HCC) [I72.3]                   Date / Time: 8/15/2024  6:09 AM    Patient Admission Status: Inpatient   Readmission Risk (Low < 19, Mod (19-27), High > 27): Readmission Risk Score: 7    Current PCP: Lizandro Ruffin MD  PCP verified by CM? Yes    Chart Reviewed: Yes      History Provided by: Patient  Patient Orientation: Alert and Oriented, Person, Place, Situation, Self    Patient Cognition: Alert    Hospitalization in the last 30 days (Readmission):  No    If yes, Readmission Assessment in  Navigator will be completed.    Advance Directives:      Code Status: Full Code   Patient's Primary Decision Maker is: Legal Next of Kin      Discharge Planning:    Patient lives with:   Type of Home: House  Primary Care Giver: Self  Patient Support Systems include: Spouse/Significant Other   Current Financial resources:    Current community resources:    Current services prior to admission:              Current DME:              Type of Home Care services:       ADLS  Prior functional level: Independent in ADLs/IADLs  Current functional level: Independent in ADLs/IADLs    PT AM-PAC:   /24  OT AM-PAC:   /24    Family can provide assistance at DC:    Would you like Case Management to discuss the discharge plan with any other family members/significant others, and if so, who? No  Plans to Return to Present Housing: Yes  Other Identified Issues/Barriers to RETURNING to current housing: N/A  Potential Assistance needed at discharge:              Potential DME:    Patient expects to discharge to:    Plan for

## 2024-08-16 NOTE — PROGRESS NOTES
INPATIENT PROGRESS NOTES    PATIENT NAME: Garry Gamez  MRN: [id number]  SERVICE DATE:  August 16, 2024   SERVICE TIME:  10:40 AM      PRIMARY SERVICE: Pulmonary Disease    CHIEF COMPLAINTS: Hypertension    INTERVAL HPI: Patient seen and examined at bedside, Interval Notes, orders reviewed. Nursing notes noted    Patient report no complaint, no chest pain, no shortness of breath, no groin pain, no abdominal pain, no nausea or vomiting.  Night was uneventful, he required few doses of as needed BP meds    New information updated in the note today, rest of the examination did not change compared to yesterday.    Review of system:     GI Abdominal pain No  Skin Rash No    Social History     Tobacco Use    Smoking status: Every Day     Current packs/day: 1.00     Average packs/day: 1 pack/day for 20.0 years (20.0 ttl pk-yrs)     Types: Cigarettes     Passive exposure: Never    Smokeless tobacco: Never   Substance Use Topics    Alcohol use: Not Currently     Comment: pt has not had a drink in 1 yr and 2 months/history of alcoholism         Problem Relation Age of Onset    Heart Disease Father          OBJECTIVE    Body mass index is 23.53 kg/m².    PHYSICAL EXAM:  Vitals:  BP (!) 161/80   Pulse 68   Temp 97.8 °F (36.6 °C) (Oral)   Resp (!) 9   Ht 1.778 m (5' 10\")   Wt 74.4 kg (164 lb)   SpO2 96%   BMI 23.53 kg/m²     General: alert, cooperative, no distress  Head: normocephalic, atraumatic  Eyes:No gross abnormalities.  ENT:  MMM no lesions  Neck:  supple and no masses  Chest : clear to auscultation bilaterally- no wheezes, rales or rhonchi, normal air movement, no respiratory distress  Heart:: Heart sounds are normal.  Regular rate and rhythm without murmur, gallop or rub.  ABD:  symmetric, soft, non-tender  Musculoskeletal : no cyanosis, no clubbing, and no edema  Neuro:  Grossly normal  Skin: No rashes or nodules noted.  Lymph node:  no cervical nodes  Urology: No [facility]   Psychiatric: appropriate    DATA:

## 2024-08-18 DIAGNOSIS — I10 ESSENTIAL (PRIMARY) HYPERTENSION: ICD-10-CM

## 2024-08-19 ENCOUNTER — TELEPHONE (OUTPATIENT)
Dept: PRIMARY CARE CLINIC | Age: 64
End: 2024-08-19

## 2024-08-19 RX ORDER — LOSARTAN POTASSIUM 25 MG/1
25 TABLET ORAL DAILY
Qty: 90 TABLET | Refills: 0 | Status: SHIPPED | OUTPATIENT
Start: 2024-08-19

## 2024-08-19 NOTE — TELEPHONE ENCOUNTER
Called and spoke with patient and advised should be on both.  Verbalized understanding states he will discuss further at tomorrows appointment.

## 2024-08-19 NOTE — TELEPHONE ENCOUNTER
Care Transitions Initial Follow Up Call    Outreach made within 2 business days of discharge: Yes    Patient: Garry Gamez Patient : 1960   MRN: [id number]  Reason for Admission: Aneurysm of internal iliac artery   Discharge Date: 24       Spoke with: PT    Discharge department/facility: [facility]    TCM Interactive Patient Contact:  Was patient able to fill all prescriptions: Yes  Was patient instructed to bring all medications to the follow-up visit: Yes  Is patient taking all medications as directed in the discharge summary? Yes  Does patient understand their discharge instructions: Yes  Does patient have questions or concerns that need addressed prior to 7-14 day follow up office visit: no    Additional needs identified to be addressed with provider  No needs identified             Scheduled appointment with PCP within 7-14 days    Follow Up  Future Appointments   Date Time Provider Department Center   2024 11:00 AM Lizandro Ruffin MD [facility] DEP   2024 11:00 AM Lizandro Ruffin MD [facility] DEP       Ariane Nieves MA

## 2024-08-19 NOTE — TELEPHONE ENCOUNTER
Received a call from the patient who stated was discharged from the hospital and was prescribed Amlodipine instead of Losartan.Patient stated that  the discharge summary stated certain medications he should stop taking but that the losartan was not one of the medications and would like to know if he should be taking both blood pressure medications.Advised the patient if he was not explained wether if should stop or not the losartan,that writer will add to the message for MA to notify the provider.Patient understood.

## 2024-08-20 ENCOUNTER — OFFICE VISIT (OUTPATIENT)
Dept: PRIMARY CARE CLINIC | Age: 64
End: 2024-08-20
Payer: COMMERCIAL

## 2024-08-20 VITALS
HEART RATE: 71 BPM | DIASTOLIC BLOOD PRESSURE: 74 MMHG | SYSTOLIC BLOOD PRESSURE: 118 MMHG | BODY MASS INDEX: 23.28 KG/M2 | OXYGEN SATURATION: 98 % | WEIGHT: 162.6 LBS | HEIGHT: 70 IN

## 2024-08-20 DIAGNOSIS — I72.3 ANEURYSM OF INTERNAL ILIAC ARTERY (HCC): ICD-10-CM

## 2024-08-20 DIAGNOSIS — M48.061 SPINAL STENOSIS AT L4-L5 LEVEL: ICD-10-CM

## 2024-08-20 DIAGNOSIS — Z09 HOSPITAL DISCHARGE FOLLOW-UP: Primary | ICD-10-CM

## 2024-08-20 DIAGNOSIS — R06.00 DYSPNEA, UNSPECIFIED TYPE: ICD-10-CM

## 2024-08-20 PROCEDURE — 1111F DSCHRG MED/CURRENT MED MERGE: CPT | Performed by: STUDENT IN AN ORGANIZED HEALTH CARE EDUCATION/TRAINING PROGRAM

## 2024-08-20 PROCEDURE — 99215 OFFICE O/P EST HI 40 MIN: CPT | Performed by: STUDENT IN AN ORGANIZED HEALTH CARE EDUCATION/TRAINING PROGRAM

## 2024-08-20 RX ORDER — GABAPENTIN 400 MG/1
400 CAPSULE ORAL 3 TIMES DAILY
Qty: 270 CAPSULE | Refills: 0 | Status: SHIPPED | OUTPATIENT
Start: 2024-08-20 | End: 2024-11-18

## 2024-08-20 RX ORDER — ALBUTEROL SULFATE 90 UG/1
2 AEROSOL, METERED RESPIRATORY (INHALATION) EVERY 6 HOURS PRN
Qty: 18 G | Refills: 3 | Status: SHIPPED | OUTPATIENT
Start: 2024-08-20

## 2024-08-20 NOTE — PROGRESS NOTES
Post-Discharge Transitional Care  Follow Up      Garry Gamez   YOB: 1960    Date of Office Visit:  8/20/2024  Date of Hospital Admission: 8/15/24  Date of Hospital Discharge: 8/16/24  Risk of hospital readmission (high >=14%. Medium >=10%) :Readmission Risk Score: 7.2      Care management risk score Rising risk (score 2-5) and Complex Care (Scores >=6): No Risk Score On File     Non face to face  following discharge, date last encounter closed (first attempt may have been earlier): 08/19/2024    Call initiated 2 business days of discharge: Yes    ASSESSMENT/PLAN:   Hospital discharge follow-up  -     OK DISCHARGE MEDS RECONCILED W/ CURRENT OUTPATIENT MED LIST  Aneurysm of internal iliac artery (HCC)  Spinal stenosis at L4-L5 level  -     gabapentin (NEURONTIN) 400 MG capsule; Take 1 capsule by mouth 3 times daily for 90 days., Disp-270 capsule, R-0Normal  Dyspnea, unspecified type  -     albuterol sulfate HFA (VENTOLIN HFA) 108 (90 Base) MCG/ACT inhaler; Inhale 2 puffs into the lungs every 6 hours as needed for Wheezing, Disp-18 g, R-3Normal    Medical Decision Making: straightforward  Return in 3 months (on 11/20/2024), or if symptoms worsen or fail to improve.           Subjective:   HPI:  Follow up of Hospital problems/diagnosis(es): Aneurysm of right iliac artery (HCC) [I72.3];Aneurysm of internal iliac artery (HCC) [I72.3]     Inpatient course: Discharge summary: \"Patient underwent repair of a saccular right common iliac artery aneurysm via endograft. Patient was admitted to the ICU for monitoring. Patient did well overnight and was discharged home on 8/16/2024.\"    Interval history/Current status: Patient is doing well, denies any difficulty with breathing, coughing, fevers, difficulty with urination or bowel movements, leg pain.  He has follow-up with vascular surgery.  He was started on amlodipine and Plavix.  Omeprazole was switched to pantoprazole.    Spinal stenosis at L4/L5 level  He

## 2024-09-16 RX ORDER — AMLODIPINE BESYLATE 5 MG/1
5 TABLET ORAL DAILY
Qty: 90 TABLET | Refills: 1 | Status: SHIPPED | OUTPATIENT
Start: 2024-09-16 | End: 2025-03-15

## 2024-10-09 ENCOUNTER — OFFICE VISIT (OUTPATIENT)
Dept: PRIMARY CARE CLINIC | Age: 64
End: 2024-10-09
Payer: COMMERCIAL

## 2024-10-09 VITALS
BODY MASS INDEX: 23.31 KG/M2 | HEART RATE: 84 BPM | OXYGEN SATURATION: 98 % | SYSTOLIC BLOOD PRESSURE: 128 MMHG | DIASTOLIC BLOOD PRESSURE: 78 MMHG | WEIGHT: 162.8 LBS | HEIGHT: 70 IN

## 2024-10-09 DIAGNOSIS — R53.83 OTHER FATIGUE: ICD-10-CM

## 2024-10-09 DIAGNOSIS — Z00.00 ROUTINE ADULT HEALTH MAINTENANCE: ICD-10-CM

## 2024-10-09 DIAGNOSIS — M48.061 SPINAL STENOSIS AT L4-L5 LEVEL: Primary | ICD-10-CM

## 2024-10-09 DIAGNOSIS — R74.01 TRANSAMINITIS: ICD-10-CM

## 2024-10-09 DIAGNOSIS — E55.9 VITAMIN D DEFICIENCY: Primary | ICD-10-CM

## 2024-10-09 DIAGNOSIS — Z13.220 SCREENING, LIPID: ICD-10-CM

## 2024-10-09 LAB
ALBUMIN SERPL-MCNC: 4.2 G/DL (ref 3.5–4.6)
ALP SERPL-CCNC: 181 U/L (ref 35–104)
ALT SERPL-CCNC: <5 U/L (ref 0–41)
ANION GAP SERPL CALCULATED.3IONS-SCNC: 12 MEQ/L (ref 9–15)
AST SERPL-CCNC: 8 U/L (ref 0–40)
BILIRUB SERPL-MCNC: 0.4 MG/DL (ref 0.2–0.7)
BUN SERPL-MCNC: 11 MG/DL (ref 8–23)
CALCIUM SERPL-MCNC: 9.4 MG/DL (ref 8.5–9.9)
CHLORIDE SERPL-SCNC: 101 MEQ/L (ref 95–107)
CHOLEST SERPL-MCNC: 159 MG/DL (ref 0–199)
CO2 SERPL-SCNC: 25 MEQ/L (ref 20–31)
CREAT SERPL-MCNC: 0.83 MG/DL (ref 0.7–1.2)
GLOBULIN SER CALC-MCNC: 3.1 G/DL (ref 2.3–3.5)
GLUCOSE SERPL-MCNC: 106 MG/DL (ref 70–99)
HDLC SERPL-MCNC: 39 MG/DL (ref 40–59)
LDL CHOLESTEROL: 104 MG/DL (ref 0–129)
POTASSIUM SERPL-SCNC: 4.6 MEQ/L (ref 3.4–4.9)
PROT SERPL-MCNC: 7.3 G/DL (ref 6.3–8)
SODIUM SERPL-SCNC: 138 MEQ/L (ref 135–144)
TRIGLYCERIDE, FASTING: 80 MG/DL (ref 0–150)

## 2024-10-09 PROCEDURE — 99213 OFFICE O/P EST LOW 20 MIN: CPT | Performed by: STUDENT IN AN ORGANIZED HEALTH CARE EDUCATION/TRAINING PROGRAM

## 2024-10-09 NOTE — PROGRESS NOTES
10/9/2024        Garry Gamez 1960 is a 64 y.o. male who presents today with:  Chief Complaint   Patient presents with    3 Month Follow-Up       HPI: 3-month follow-up  Spinal stenosis at L4-L5  Patient takes gabapentin which works very well.  This was filled 8/20 for 3 months.  He denies any illicit drug use. He denies any side effects. He would like to continue the current dose without changing.     Assessment & Plan   1. Spinal stenosis at L4-L5 level  -     Pain Management Drug Screen; Future  2. Screening, lipid  -     Lipid, Fasting; Future     There are no discontinued medications.  No follow-ups on file.    He will call when he is out of gabapentin for 3-month refill as we have completed our visit today.  He will go do his labs today and attempt to leave a urine sample    Objective  No Known Allergies  Current Outpatient Medications   Medication Sig Dispense Refill    amLODIPine (NORVASC) 5 MG tablet Take 1 tablet by mouth daily 90 tablet 1    gabapentin (NEURONTIN) 400 MG capsule Take 1 capsule by mouth 3 times daily for 90 days. 270 capsule 0    albuterol sulfate HFA (VENTOLIN HFA) 108 (90 Base) MCG/ACT inhaler Inhale 2 puffs into the lungs every 6 hours as needed for Wheezing 18 g 3    losartan (COZAAR) 25 MG tablet TAKE 1 TABLET BY MOUTH ONCE DAILY 90 tablet 0    clopidogrel (PLAVIX) 75 MG tablet Take 1 tablet by mouth daily 30 tablet 3    pantoprazole (PROTONIX) 40 MG tablet Take 1 tablet by mouth every morning (before breakfast) 30 tablet 3    atorvastatin (LIPITOR) 20 MG tablet TAKE 1 TABLET BY MOUTH ONCE DAILY 90 tablet 0    sertraline (ZOLOFT) 25 MG tablet Take 2 tablets by mouth daily 180 tablet 0    sildenafil (VIAGRA) 50 MG tablet Take 1 tablet by mouth daily as needed for Erectile Dysfunction 20 tablet 0    ASPIRIN 81 PO Take by mouth daily      Multiple Vitamin (MULTIVITAMIN) TABS tablet Take 1 tablet by mouth daily 30 tablet 0     No current facility-administered medications for

## 2024-10-10 LAB
ESTIMATED AVERAGE GLUCOSE: 123 MG/DL
HBA1C MFR BLD: 5.9 % (ref 4–6)
SHBG SERPL-SCNC: 76 NMOL/L (ref 19–76)
TESTOST FREE SERPL-MCNC: 96.4 PG/ML (ref 47–244)
TESTOST SERPL-MCNC: 775 NG/DL (ref 193–740)
VITAMIN B-12: 525 PG/ML (ref 232–1245)
VITAMIN D 25-HYDROXY: 23.3 NG/ML (ref 30–100)

## 2024-10-10 RX ORDER — ERGOCALCIFEROL 1.25 MG/1
50000 CAPSULE, LIQUID FILLED ORAL WEEKLY
Qty: 12 CAPSULE | Refills: 3 | Status: SHIPPED | OUTPATIENT
Start: 2024-10-10

## 2024-10-22 DIAGNOSIS — F33.1 MODERATE EPISODE OF RECURRENT MAJOR DEPRESSIVE DISORDER (HCC): ICD-10-CM

## 2024-10-22 RX ORDER — SERTRALINE HYDROCHLORIDE 25 MG/1
50 TABLET, FILM COATED ORAL DAILY
Qty: 180 TABLET | Refills: 0 | Status: SHIPPED | OUTPATIENT
Start: 2024-10-22

## 2024-11-11 DIAGNOSIS — I70.212 ATHEROSCLEROSIS OF NATIVE ARTERY OF LEFT LOWER EXTREMITY WITH INTERMITTENT CLAUDICATION (HCC): ICD-10-CM

## 2024-11-11 DIAGNOSIS — I10 ESSENTIAL (PRIMARY) HYPERTENSION: ICD-10-CM

## 2024-11-12 RX ORDER — ATORVASTATIN CALCIUM 20 MG/1
20 TABLET, FILM COATED ORAL DAILY
Qty: 90 TABLET | Refills: 0 | Status: SHIPPED | OUTPATIENT
Start: 2024-11-12

## 2024-11-12 RX ORDER — LOSARTAN POTASSIUM 25 MG/1
25 TABLET ORAL DAILY
Qty: 90 TABLET | Refills: 0 | Status: SHIPPED | OUTPATIENT
Start: 2024-11-12

## 2024-12-12 DIAGNOSIS — M48.061 SPINAL STENOSIS AT L4-L5 LEVEL: ICD-10-CM

## 2024-12-12 RX ORDER — AMLODIPINE BESYLATE 5 MG/1
5 TABLET ORAL DAILY
Qty: 90 TABLET | Refills: 0 | OUTPATIENT
Start: 2024-12-12 | End: 2025-06-10

## 2024-12-12 RX ORDER — GABAPENTIN 400 MG/1
400 CAPSULE ORAL 3 TIMES DAILY
Qty: 270 CAPSULE | Refills: 0 | OUTPATIENT
Start: 2024-12-12 | End: 2025-03-12

## 2024-12-12 NOTE — TELEPHONE ENCOUNTER
Rx request   Requested Prescriptions     Pending Prescriptions Disp Refills    gabapentin (NEURONTIN) 400 MG capsule [Pharmacy Med Name: gabapentin 400 mg capsule] 270 capsule 0     Sig: Take 1 capsule by mouth 3 times daily for 90 days.    amLODIPine (NORVASC) 5 MG tablet [Pharmacy Med Name: amlodipine 5 mg tablet] 90 tablet 0     Sig: Take 1 tablet by mouth daily     LOV 10/9/2024  Next Visit Date:  No future appointments.

## 2024-12-14 DIAGNOSIS — M48.061 SPINAL STENOSIS AT L4-L5 LEVEL: ICD-10-CM

## 2024-12-14 DIAGNOSIS — F33.1 MODERATE EPISODE OF RECURRENT MAJOR DEPRESSIVE DISORDER (HCC): ICD-10-CM

## 2024-12-16 RX ORDER — AMLODIPINE BESYLATE 5 MG/1
5 TABLET ORAL DAILY
Qty: 90 TABLET | Refills: 0 | OUTPATIENT
Start: 2024-12-16 | End: 2025-06-14

## 2024-12-16 RX ORDER — SERTRALINE HYDROCHLORIDE 25 MG/1
50 TABLET, FILM COATED ORAL DAILY
Qty: 180 TABLET | Refills: 0 | Status: SHIPPED | OUTPATIENT
Start: 2024-12-16

## 2024-12-16 RX ORDER — GABAPENTIN 400 MG/1
400 CAPSULE ORAL 3 TIMES DAILY
Qty: 270 CAPSULE | Refills: 0 | OUTPATIENT
Start: 2024-12-16 | End: 2025-03-16

## 2024-12-23 ENCOUNTER — OFFICE VISIT (OUTPATIENT)
Dept: PRIMARY CARE CLINIC | Age: 64
End: 2024-12-23
Payer: COMMERCIAL

## 2024-12-23 VITALS
DIASTOLIC BLOOD PRESSURE: 84 MMHG | WEIGHT: 159 LBS | BODY MASS INDEX: 22.76 KG/M2 | HEIGHT: 70 IN | SYSTOLIC BLOOD PRESSURE: 124 MMHG | OXYGEN SATURATION: 98 % | HEART RATE: 59 BPM

## 2024-12-23 DIAGNOSIS — Z72.0 TOBACCO USE: ICD-10-CM

## 2024-12-23 DIAGNOSIS — K21.9 GASTROESOPHAGEAL REFLUX DISEASE, UNSPECIFIED WHETHER ESOPHAGITIS PRESENT: Primary | ICD-10-CM

## 2024-12-23 DIAGNOSIS — M48.061 SPINAL STENOSIS AT L4-L5 LEVEL: ICD-10-CM

## 2024-12-23 PROCEDURE — 99214 OFFICE O/P EST MOD 30 MIN: CPT | Performed by: STUDENT IN AN ORGANIZED HEALTH CARE EDUCATION/TRAINING PROGRAM

## 2024-12-23 RX ORDER — PANTOPRAZOLE SODIUM 40 MG/1
40 TABLET, DELAYED RELEASE ORAL
Qty: 90 TABLET | Refills: 3 | Status: SHIPPED | OUTPATIENT
Start: 2024-12-23

## 2024-12-23 RX ORDER — GABAPENTIN 400 MG/1
400 CAPSULE ORAL 3 TIMES DAILY
Qty: 270 CAPSULE | Refills: 0 | Status: SHIPPED | OUTPATIENT
Start: 2024-12-23 | End: 2025-03-23

## 2024-12-23 NOTE — PROGRESS NOTES
[facility]  [address]  [facility] [address]  Dept: [phone]  Dept Fax: [phone]  Loc: [phone]     12/23/2024    Visit type: Office visit    Reason for Visit: Medication Refill       ASSESSMENT/PLAN   1. Gastroesophageal reflux disease, unspecified whether esophagitis present  Assessment & Plan:  Chronic; controlled continue ppi   Orders:  -     pantoprazole (PROTONIX) 40 MG tablet; Take 1 tablet by mouth every morning (before breakfast), Disp-90 tablet, R-3Normal  2. Spinal stenosis at L4-L5 level  Assessment & Plan:  Chronic; controlled on gabapentin - takes BID now but will sometimes takes TID   Orders:  -     gabapentin (NEURONTIN) 400 MG capsule; Take 1 capsule by mouth 3 times daily for 90 days., Disp-270 capsule, R-0Normal  3. Tobacco use        No follow-ups on file.  Orders Placed This Encounter   Medications    pantoprazole (PROTONIX) 40 MG tablet     Sig: Take 1 tablet by mouth every morning (before breakfast)     Dispense:  90 tablet     Refill:  3    gabapentin (NEURONTIN) 400 MG capsule     Sig: Take 1 capsule by mouth 3 times daily for 90 days.     Dispense:  270 capsule     Refill:  0      Subjective    Patient: Garry Gamez is a 64 y.o. male     HPI: needs refills       Review of Systems   Objective     Vitals:    12/23/24 0932   BP: 124/84   Site: Left Upper Arm   Position: Sitting   Cuff Size: Medium Adult   Pulse: 59   SpO2: 98%   Weight: 72.1 kg (159 lb)   Height: 1.778 m (5' 10\")     Physical Exam  No Known Allergies    Current Outpatient Medications:     pantoprazole (PROTONIX) 40 MG tablet, Take 1 tablet by mouth every morning (before breakfast), Disp: 90 tablet, Rfl: 3    gabapentin (NEURONTIN) 400 MG capsule, Take 1 capsule by mouth 3 times daily for 90 days., Disp: 270 capsule, Rfl: 0    sertraline (ZOLOFT) 25 MG tablet, TAKE 2 TABLETS BY MOUTH DAILY, Disp: 180 tablet, Rfl: 0

## 2024-12-26 LAB
6MAM UR QL: NOT DETECTED
7-AMINOCLONAZEPAM: NOT DETECTED
ALPHA-OH-ALPRAZOLAM: NOT DETECTED
ALPHA-OH-MIDAZOLAM, URINE: NOT DETECTED
ALPRAZOLAM: NOT DETECTED
AMPHET UR QL SCN: NOT DETECTED
BARBITURATES: NEGATIVE
BENZOYLECGONINE: NEGATIVE
BUPRENORPHINE: NOT DETECTED
CARISOPRODOL UR QL: NEGATIVE
CLONAZEPAM UR QL: NOT DETECTED
CODEINE: NOT DETECTED
CREAT UR-MCNC: 246.3 MG/DL (ref 20–400)
DIAZEPAM: NOT DETECTED
ETHYL GLUCURONIDE: NEGATIVE
FENTANYL UR QL: NOT DETECTED
GABAPENTIN: PRESENT
HYDROCODONE UR QL: NOT DETECTED
HYDROMORPHONE: NOT DETECTED
LORAZEPAM UR QL: NOT DETECTED
MARIJUANA METABOLITE: NORMAL
MDA: NOT DETECTED
MDEA: NOT DETECTED
MDMA UR QL: NOT DETECTED
MEPERIDINE: NOT DETECTED
METHADONE: NEGATIVE
METHAMPHETAMINE: NOT DETECTED
METHYLPHENIDATE: NOT DETECTED
MIDAZOLAM UR QL SCN: NOT DETECTED
MORPHINE: NOT DETECTED
NALOXONE: NOT DETECTED
NORBUPRENORPHINE, FREE: NOT DETECTED
NORDIAZEPAM: NOT DETECTED
NORFENTANYL: NOT DETECTED
NORHYDROCODONE, URINE: NOT DETECTED
NOROXYCODONE: NOT DETECTED
NOROXYMORPHONE, URINE: NOT DETECTED
OXAZEPAM UR QL: NOT DETECTED
OXYCODONE UR QL: NOT DETECTED
OXYMORPHONE UR QL: NOT DETECTED
PAIN MANAGEMENT DRUG PANEL: NORMAL
PATHOLOGY STUDY: NORMAL
PCP: NEGATIVE
PHENTERMINE: NOT DETECTED
PREGABALIN: NOT DETECTED
TAPENTADOL, URINE: NOT DETECTED
TAPENTADOL-O-SULFATE, URINE: NOT DETECTED
TEMAZEPAM: NOT DETECTED
TRAMADOL: NEGATIVE
ZOLPIDEM: NOT DETECTED

## 2025-02-14 DIAGNOSIS — I70.212 ATHEROSCLEROSIS OF NATIVE ARTERY OF LEFT LOWER EXTREMITY WITH INTERMITTENT CLAUDICATION: ICD-10-CM

## 2025-02-14 DIAGNOSIS — I10 ESSENTIAL (PRIMARY) HYPERTENSION: ICD-10-CM

## 2025-02-17 DIAGNOSIS — I10 ESSENTIAL (PRIMARY) HYPERTENSION: ICD-10-CM

## 2025-02-18 RX ORDER — ATORVASTATIN CALCIUM 20 MG/1
20 TABLET, FILM COATED ORAL DAILY
Qty: 90 TABLET | Refills: 0 | Status: SHIPPED | OUTPATIENT
Start: 2025-02-18

## 2025-02-18 RX ORDER — AMLODIPINE BESYLATE 5 MG/1
5 TABLET ORAL DAILY
Qty: 90 TABLET | Refills: 1 | OUTPATIENT
Start: 2025-02-18 | End: 2025-08-17

## 2025-02-18 RX ORDER — LOSARTAN POTASSIUM 25 MG/1
25 TABLET ORAL DAILY
Qty: 90 TABLET | Refills: 0 | OUTPATIENT
Start: 2025-02-18

## 2025-02-18 RX ORDER — AMLODIPINE BESYLATE 5 MG/1
5 TABLET ORAL DAILY
Qty: 90 TABLET | Refills: 0 | Status: SHIPPED | OUTPATIENT
Start: 2025-02-18 | End: 2025-08-17

## 2025-02-18 RX ORDER — LOSARTAN POTASSIUM 25 MG/1
25 TABLET ORAL DAILY
Qty: 90 TABLET | Refills: 0 | Status: SHIPPED | OUTPATIENT
Start: 2025-02-18

## 2025-05-05 DIAGNOSIS — F33.1 MODERATE EPISODE OF RECURRENT MAJOR DEPRESSIVE DISORDER (HCC): ICD-10-CM

## 2025-05-05 RX ORDER — SERTRALINE HYDROCHLORIDE 25 MG/1
50 TABLET, FILM COATED ORAL DAILY
Qty: 180 TABLET | Refills: 0 | OUTPATIENT
Start: 2025-05-05

## 2025-05-05 NOTE — TELEPHONE ENCOUNTER
Comments:     Last Office Visit (last PCP visit):   10/9/2024    Next Visit Date:  No future appointments.    **If hasn't been seen in over a year OR hasn't followed up according to last diabetes/ADHD visit, make appointment for patient before sending refill to provider.    Rx requested:  Requested Prescriptions     Pending Prescriptions Disp Refills    sertraline (ZOLOFT) 25 MG tablet [Pharmacy Med Name: sertraline 25 mg tablet] 180 tablet 0     Sig: TAKE 2 TABLETS BY MOUTH DAILY

## 2025-05-08 ENCOUNTER — OFFICE VISIT (OUTPATIENT)
Dept: PRIMARY CARE CLINIC | Age: 65
End: 2025-05-08
Payer: COMMERCIAL

## 2025-05-08 VITALS
DIASTOLIC BLOOD PRESSURE: 76 MMHG | WEIGHT: 148.4 LBS | HEART RATE: 52 BPM | BODY MASS INDEX: 21.24 KG/M2 | SYSTOLIC BLOOD PRESSURE: 118 MMHG | OXYGEN SATURATION: 98 % | HEIGHT: 70 IN

## 2025-05-08 DIAGNOSIS — Z72.0 TOBACCO ABUSE: ICD-10-CM

## 2025-05-08 DIAGNOSIS — F33.1 MODERATE EPISODE OF RECURRENT MAJOR DEPRESSIVE DISORDER (HCC): ICD-10-CM

## 2025-05-08 DIAGNOSIS — E55.9 VITAMIN D DEFICIENCY: ICD-10-CM

## 2025-05-08 DIAGNOSIS — I10 ESSENTIAL (PRIMARY) HYPERTENSION: Primary | ICD-10-CM

## 2025-05-08 DIAGNOSIS — I70.212 ATHEROSCLEROSIS OF NATIVE ARTERY OF LEFT LOWER EXTREMITY WITH INTERMITTENT CLAUDICATION: ICD-10-CM

## 2025-05-08 DIAGNOSIS — K21.9 GASTROESOPHAGEAL REFLUX DISEASE, UNSPECIFIED WHETHER ESOPHAGITIS PRESENT: ICD-10-CM

## 2025-05-08 DIAGNOSIS — F41.1 GAD (GENERALIZED ANXIETY DISORDER): ICD-10-CM

## 2025-05-08 PROCEDURE — 99214 OFFICE O/P EST MOD 30 MIN: CPT | Performed by: STUDENT IN AN ORGANIZED HEALTH CARE EDUCATION/TRAINING PROGRAM

## 2025-05-08 PROCEDURE — 99407 BEHAV CHNG SMOKING > 10 MIN: CPT | Performed by: STUDENT IN AN ORGANIZED HEALTH CARE EDUCATION/TRAINING PROGRAM

## 2025-05-08 PROCEDURE — 3074F SYST BP LT 130 MM HG: CPT | Performed by: STUDENT IN AN ORGANIZED HEALTH CARE EDUCATION/TRAINING PROGRAM

## 2025-05-08 PROCEDURE — 3078F DIAST BP <80 MM HG: CPT | Performed by: STUDENT IN AN ORGANIZED HEALTH CARE EDUCATION/TRAINING PROGRAM

## 2025-05-08 RX ORDER — AMLODIPINE BESYLATE 5 MG/1
5 TABLET ORAL DAILY
Qty: 90 TABLET | Refills: 1 | Status: SHIPPED | OUTPATIENT
Start: 2025-05-08 | End: 2025-11-04

## 2025-05-08 RX ORDER — LOSARTAN POTASSIUM 25 MG/1
25 TABLET ORAL DAILY
Qty: 90 TABLET | Refills: 1 | Status: SHIPPED | OUTPATIENT
Start: 2025-05-08

## 2025-05-08 RX ORDER — ATORVASTATIN CALCIUM 20 MG/1
20 TABLET, FILM COATED ORAL DAILY
Qty: 90 TABLET | Refills: 1 | Status: SHIPPED | OUTPATIENT
Start: 2025-05-08

## 2025-05-08 RX ORDER — SERTRALINE HYDROCHLORIDE 25 MG/1
50 TABLET, FILM COATED ORAL DAILY
Qty: 180 TABLET | Refills: 0 | Status: CANCELLED | OUTPATIENT
Start: 2025-05-08

## 2025-05-08 ASSESSMENT — PATIENT HEALTH QUESTIONNAIRE - PHQ9
SUM OF ALL RESPONSES TO PHQ QUESTIONS 1-9: 17
3. TROUBLE FALLING OR STAYING ASLEEP: MORE THAN HALF THE DAYS
2. FEELING DOWN, DEPRESSED OR HOPELESS: MORE THAN HALF THE DAYS
5. POOR APPETITE OR OVEREATING: NEARLY EVERY DAY
10. IF YOU CHECKED OFF ANY PROBLEMS, HOW DIFFICULT HAVE THESE PROBLEMS MADE IT FOR YOU TO DO YOUR WORK, TAKE CARE OF THINGS AT HOME, OR GET ALONG WITH OTHER PEOPLE: VERY DIFFICULT
4. FEELING TIRED OR HAVING LITTLE ENERGY: MORE THAN HALF THE DAYS
9. THOUGHTS THAT YOU WOULD BE BETTER OFF DEAD, OR OF HURTING YOURSELF: MORE THAN HALF THE DAYS
6. FEELING BAD ABOUT YOURSELF - OR THAT YOU ARE A FAILURE OR HAVE LET YOURSELF OR YOUR FAMILY DOWN: MORE THAN HALF THE DAYS
SUM OF ALL RESPONSES TO PHQ QUESTIONS 1-9: 15
8. MOVING OR SPEAKING SO SLOWLY THAT OTHER PEOPLE COULD HAVE NOTICED. OR THE OPPOSITE, BEING SO FIGETY OR RESTLESS THAT YOU HAVE BEEN MOVING AROUND A LOT MORE THAN USUAL: NOT AT ALL
1. LITTLE INTEREST OR PLEASURE IN DOING THINGS: MORE THAN HALF THE DAYS
7. TROUBLE CONCENTRATING ON THINGS, SUCH AS READING THE NEWSPAPER OR WATCHING TELEVISION: MORE THAN HALF THE DAYS

## 2025-05-08 ASSESSMENT — COLUMBIA-SUICIDE SEVERITY RATING SCALE - C-SSRS
6. HAVE YOU EVER DONE ANYTHING, STARTED TO DO ANYTHING, OR PREPARED TO DO ANYTHING TO END YOUR LIFE?: NO
1. WITHIN THE PAST MONTH, HAVE YOU WISHED YOU WERE DEAD OR WISHED YOU COULD GO TO SLEEP AND NOT WAKE UP?: NO
2. HAVE YOU ACTUALLY HAD ANY THOUGHTS OF KILLING YOURSELF?: NO

## 2025-05-08 NOTE — PROGRESS NOTES
5/8/2025        Garry Gamez 1960 is a 64 y.o. male who presents today with:  Chief Complaint   Patient presents with    Follow-up     Here for zoloft refill having some depression        HPI: 6-month follow-up    Erectile dysfunction  Testosterone was normal  Uses sildenafil as needed     GERD  denies dysphagia, unexpected weight loss, melena, hematochezia, or episodes of acid reflux. Tolerating meds well.     Anxiety/MDD  Tolerating sertraline at 50 mg daily.  Reports symptoms have improved since he stopped working.  Patient denies any paranoia, visual or auditory hallucinations, suicidal or homicidal ideations.      Atherosclerosis of artery of left lower extremity and right lower extremity  Status postsurgery of the left lower extremity embolus.  Currently on aspirin.  Sees vascular surgeon.    HTN  Patient denies any current chest pain, shortness of breath, palpitations, diaphoresis, lightheadedness.     HLD  reports not experiencing any leg cramping from medication side effect.   Last lipid panel: 10/9/2024: Triglycerides 80, HDL 39,     Vitamin D Deficiency  Taking weekly vitamin D 50,000 units  Last vitamin D: 10/9/2024: 23.3    Spinal stenosis at L4-L5  Patient takes gabapentin which works very well.  This was filled 8/20 for 3 months. He denies any illicit drug use. He denies any side effects. He would like to continue the current dose without changing.    Tobacco abuse chronic; uncontrolled   - started at age 14  - continues to smoke  - on average smokes about 1-2 PPD  - Had discussion about the risks of continuing to smoke, including COPD, lung disease, cancer, and decreased immune function.  - Discussed lung cancer screening guidelines including low-dose chest CT starting at age 50.  - Patient states no desire to quit  - Discussed options to help patient quit, including medications like Chantix, nicotine replacement therapy options including gums, patches Wellbutrin.   - Patient made

## 2025-05-08 NOTE — PATIENT INSTRUCTIONS
If you receive an email to fill out a survey about our care here today, I would greatly appreciate it if you could fill it out for me, thank you!     Your vitamin D is low at 23 .  Normal level is .  A significant amount of side effects can result from vitamin D deficiency including fatigue, chronic pains, mood disorders, memory dysfunction, chronic infections, etc.  I recommend:   -Vitamin D3 10,000 units daily with your first meal of the day ideally.  Vitamin D increases calcium in the blood.  To put the calcium into your bones you need to take vitamin K2 100 mcg daily with D3.  OTC supplements usually sell this as a combination medication D3 plus K2.  If you can find the right dosages combined please take this, otherwise take them separately.  -Magnesium glycinate 120 to 200 mg twice daily to help absorb Vitamin D  -Zinc 20 mg daily 2 hours before or after a meal. Zinc also comes as a 50 mg dose that you can take 3x per week instead.  You do not have to take all of these supplements, it is only beneficial if you do, but I specifically recommend the Vitamin D3 and K2 above all.

## 2025-05-18 DIAGNOSIS — M48.061 SPINAL STENOSIS AT L4-L5 LEVEL: ICD-10-CM

## 2025-05-19 RX ORDER — GABAPENTIN 400 MG/1
400 CAPSULE ORAL 3 TIMES DAILY
Qty: 270 CAPSULE | Refills: 0 | Status: SHIPPED | OUTPATIENT
Start: 2025-05-19 | End: 2025-08-17

## 2025-07-23 ENCOUNTER — APPOINTMENT (OUTPATIENT)
Dept: CT IMAGING | Age: 65
DRG: 181 | End: 2025-07-23
Payer: COMMERCIAL

## 2025-07-23 ENCOUNTER — HOSPITAL ENCOUNTER (INPATIENT)
Age: 65
LOS: 8 days | Discharge: NURSING, REHAB OR LONG-TERM CARE | DRG: 181 | End: 2025-07-31
Attending: STUDENT IN AN ORGANIZED HEALTH CARE EDUCATION/TRAINING PROGRAM | Admitting: STUDENT IN AN ORGANIZED HEALTH CARE EDUCATION/TRAINING PROGRAM
Payer: COMMERCIAL

## 2025-07-23 ENCOUNTER — OFFICE VISIT (OUTPATIENT)
Dept: PRIMARY CARE CLINIC | Age: 65
End: 2025-07-23
Payer: COMMERCIAL

## 2025-07-23 ENCOUNTER — APPOINTMENT (OUTPATIENT)
Dept: ULTRASOUND IMAGING | Age: 65
DRG: 181 | End: 2025-07-23
Payer: COMMERCIAL

## 2025-07-23 VITALS
BODY MASS INDEX: 21.52 KG/M2 | HEART RATE: 79 BPM | DIASTOLIC BLOOD PRESSURE: 100 MMHG | WEIGHT: 150 LBS | SYSTOLIC BLOOD PRESSURE: 144 MMHG | OXYGEN SATURATION: 98 %

## 2025-07-23 DIAGNOSIS — Z86.79 HISTORY OF ARTERIAL OCCLUSION: ICD-10-CM

## 2025-07-23 DIAGNOSIS — R20.9 PAINFUL AND COLD LOWER EXTREMITY: Primary | ICD-10-CM

## 2025-07-23 DIAGNOSIS — I70.209: Primary | ICD-10-CM

## 2025-07-23 DIAGNOSIS — M79.606 PAINFUL AND COLD LOWER EXTREMITY: Primary | ICD-10-CM

## 2025-07-23 PROBLEM — I70.202 FEMORAL ARTERY OCCLUSION, LEFT: Status: ACTIVE | Noted: 2025-07-23

## 2025-07-23 LAB
ALBUMIN SERPL-MCNC: 5 G/DL (ref 3.5–4.6)
ALP SERPL-CCNC: 166 U/L (ref 35–104)
ALT SERPL-CCNC: 27 U/L (ref 0–41)
ANION GAP SERPL CALCULATED.3IONS-SCNC: 17 MEQ/L (ref 9–15)
ANISOCYTOSIS BLD QL SMEAR: ABNORMAL
ANTI-XA UNFRAC HEPARIN: <0.1 IU/ML
APTT PPP: 31.3 SEC (ref 24.4–36.8)
AST SERPL-CCNC: 77 U/L (ref 0–40)
BASOPHILS # BLD: 0 K/UL (ref 0–0.2)
BASOPHILS NFR BLD: 0.2 %
BILIRUB SERPL-MCNC: 0.8 MG/DL (ref 0.2–0.7)
BUN SERPL-MCNC: 8 MG/DL (ref 8–23)
CALCIUM SERPL-MCNC: 9.5 MG/DL (ref 8.5–9.9)
CHLORIDE SERPL-SCNC: 96 MEQ/L (ref 95–107)
CO2 SERPL-SCNC: 18 MEQ/L (ref 20–31)
CREAT SERPL-MCNC: 0.77 MG/DL (ref 0.7–1.2)
DACRYOCYTES BLD QL SMEAR: ABNORMAL
EOSINOPHIL # BLD: 0 K/UL (ref 0–0.7)
EOSINOPHIL NFR BLD: 2.5 %
ERYTHROCYTE [DISTWIDTH] IN BLOOD BY AUTOMATED COUNT: 14.5 % (ref 11.5–14.5)
GLOBULIN SER CALC-MCNC: 2.8 G/DL (ref 2.3–3.5)
GLUCOSE SERPL-MCNC: 128 MG/DL (ref 70–99)
HCT VFR BLD AUTO: 45.2 % (ref 42–52)
HGB BLD-MCNC: 15.7 G/DL (ref 14–18)
INR PPP: 1.2
LYMPHOCYTES # BLD: 2 K/UL (ref 1–4.8)
LYMPHOCYTES NFR BLD: 11 %
MACROCYTES BLD QL SMEAR: ABNORMAL
MAGNESIUM SERPL-MCNC: 1.7 MG/DL (ref 1.7–2.4)
MCH RBC QN AUTO: 30.7 PG (ref 27–31.3)
MCHC RBC AUTO-ENTMCNC: 34.7 % (ref 33–37)
MCV RBC AUTO: 88.3 FL (ref 79–92.2)
MONOCYTES # BLD: 0.5 K/UL (ref 0.2–0.8)
MONOCYTES NFR BLD: 2.9 %
NEUTROPHILS # BLD: 14.3 K/UL (ref 1.4–6.5)
NEUTS SEG NFR BLD: 86 %
OVALOCYTES BLD QL SMEAR: ABNORMAL
PLATELET # BLD AUTO: 179 K/UL (ref 130–400)
PLATELET BLD QL SMEAR: ADEQUATE
POC CREATININE WHOLE BLOOD: 0.7
POIKILOCYTOSIS BLD QL SMEAR: ABNORMAL
POTASSIUM SERPL-SCNC: 3.9 MEQ/L (ref 3.4–4.9)
PROT SERPL-MCNC: 7.8 G/DL (ref 6.3–8)
PROTHROMBIN TIME: 15.6 SEC (ref 12.3–14.9)
RBC # BLD AUTO: 5.12 M/UL (ref 4.7–6.1)
SLIDE REVIEW: ABNORMAL
SMUDGE CELLS BLD QL SMEAR: 4.8
SODIUM SERPL-SCNC: 131 MEQ/L (ref 135–144)
VARIANT LYMPHS NFR BLD: 1 %
WBC # BLD AUTO: 16.6 K/UL (ref 4.8–10.8)

## 2025-07-23 PROCEDURE — 2580000003 HC RX 258: Performed by: PHYSICIAN ASSISTANT

## 2025-07-23 PROCEDURE — 6370000000 HC RX 637 (ALT 250 FOR IP): Performed by: NURSE PRACTITIONER

## 2025-07-23 PROCEDURE — 6360000002 HC RX W HCPCS: Performed by: PHYSICIAN ASSISTANT

## 2025-07-23 PROCEDURE — 85025 COMPLETE CBC W/AUTO DIFF WBC: CPT

## 2025-07-23 PROCEDURE — 85610 PROTHROMBIN TIME: CPT

## 2025-07-23 PROCEDURE — 6360000004 HC RX CONTRAST MEDICATION: Performed by: PHYSICIAN ASSISTANT

## 2025-07-23 PROCEDURE — 2000000000 HC ICU R&B

## 2025-07-23 PROCEDURE — 99215 OFFICE O/P EST HI 40 MIN: CPT | Performed by: STUDENT IN AN ORGANIZED HEALTH CARE EDUCATION/TRAINING PROGRAM

## 2025-07-23 PROCEDURE — 99285 EMERGENCY DEPT VISIT HI MDM: CPT

## 2025-07-23 PROCEDURE — 75635 CT ANGIO ABDOMINAL ARTERIES: CPT

## 2025-07-23 PROCEDURE — 85520 HEPARIN ASSAY: CPT

## 2025-07-23 PROCEDURE — 96374 THER/PROPH/DIAG INJ IV PUSH: CPT

## 2025-07-23 PROCEDURE — 93926 LOWER EXTREMITY STUDY: CPT

## 2025-07-23 PROCEDURE — 6360000002 HC RX W HCPCS: Performed by: NURSE PRACTITIONER

## 2025-07-23 PROCEDURE — 96375 TX/PRO/DX INJ NEW DRUG ADDON: CPT

## 2025-07-23 PROCEDURE — 80053 COMPREHEN METABOLIC PANEL: CPT

## 2025-07-23 PROCEDURE — 85730 THROMBOPLASTIN TIME PARTIAL: CPT

## 2025-07-23 PROCEDURE — 83735 ASSAY OF MAGNESIUM: CPT

## 2025-07-23 RX ORDER — POTASSIUM CHLORIDE 1500 MG/1
40 TABLET, EXTENDED RELEASE ORAL PRN
Status: DISCONTINUED | OUTPATIENT
Start: 2025-07-23 | End: 2025-07-31 | Stop reason: HOSPADM

## 2025-07-23 RX ORDER — ONDANSETRON 2 MG/ML
4 INJECTION INTRAMUSCULAR; INTRAVENOUS EVERY 6 HOURS PRN
Status: DISCONTINUED | OUTPATIENT
Start: 2025-07-23 | End: 2025-07-31 | Stop reason: HOSPADM

## 2025-07-23 RX ORDER — HEPARIN SODIUM 1000 [USP'U]/ML
80 INJECTION, SOLUTION INTRAVENOUS; SUBCUTANEOUS PRN
Status: DISCONTINUED | OUTPATIENT
Start: 2025-07-23 | End: 2025-07-24

## 2025-07-23 RX ORDER — SODIUM CHLORIDE 0.9 % (FLUSH) 0.9 %
5-40 SYRINGE (ML) INJECTION EVERY 12 HOURS SCHEDULED
Status: DISCONTINUED | OUTPATIENT
Start: 2025-07-23 | End: 2025-07-31 | Stop reason: HOSPADM

## 2025-07-23 RX ORDER — HYDRALAZINE HYDROCHLORIDE 20 MG/ML
10 INJECTION INTRAMUSCULAR; INTRAVENOUS ONCE
Status: COMPLETED | OUTPATIENT
Start: 2025-07-23 | End: 2025-07-23

## 2025-07-23 RX ORDER — HEPARIN SODIUM 1000 [USP'U]/ML
80 INJECTION, SOLUTION INTRAVENOUS; SUBCUTANEOUS ONCE
Status: COMPLETED | OUTPATIENT
Start: 2025-07-23 | End: 2025-07-23

## 2025-07-23 RX ORDER — POTASSIUM CHLORIDE 7.45 MG/ML
10 INJECTION INTRAVENOUS PRN
Status: DISCONTINUED | OUTPATIENT
Start: 2025-07-23 | End: 2025-07-31 | Stop reason: HOSPADM

## 2025-07-23 RX ORDER — NICARDIPINE HYDROCHLORIDE 0.1 MG/ML
.5-15 INJECTION INTRAVENOUS CONTINUOUS
Status: DISCONTINUED | OUTPATIENT
Start: 2025-07-23 | End: 2025-07-25

## 2025-07-23 RX ORDER — HEPARIN SODIUM 1000 [USP'U]/ML
40 INJECTION, SOLUTION INTRAVENOUS; SUBCUTANEOUS PRN
Status: DISCONTINUED | OUTPATIENT
Start: 2025-07-23 | End: 2025-07-24

## 2025-07-23 RX ORDER — MAGNESIUM SULFATE IN WATER 40 MG/ML
2000 INJECTION, SOLUTION INTRAVENOUS PRN
Status: DISCONTINUED | OUTPATIENT
Start: 2025-07-23 | End: 2025-07-31 | Stop reason: HOSPADM

## 2025-07-23 RX ORDER — MORPHINE SULFATE 4 MG/ML
4 INJECTION, SOLUTION INTRAMUSCULAR; INTRAVENOUS ONCE
Refills: 0 | Status: COMPLETED | OUTPATIENT
Start: 2025-07-23 | End: 2025-07-23

## 2025-07-23 RX ORDER — IOPAMIDOL 755 MG/ML
125 INJECTION, SOLUTION INTRAVASCULAR
Status: COMPLETED | OUTPATIENT
Start: 2025-07-23 | End: 2025-07-23

## 2025-07-23 RX ORDER — SODIUM CHLORIDE 9 MG/ML
INJECTION, SOLUTION INTRAVENOUS PRN
Status: DISCONTINUED | OUTPATIENT
Start: 2025-07-23 | End: 2025-07-31 | Stop reason: HOSPADM

## 2025-07-23 RX ORDER — ACETAMINOPHEN 325 MG/1
650 TABLET ORAL EVERY 6 HOURS PRN
Status: DISCONTINUED | OUTPATIENT
Start: 2025-07-23 | End: 2025-07-31 | Stop reason: HOSPADM

## 2025-07-23 RX ORDER — ACETAMINOPHEN 650 MG/1
650 SUPPOSITORY RECTAL EVERY 6 HOURS PRN
Status: DISCONTINUED | OUTPATIENT
Start: 2025-07-23 | End: 2025-07-31 | Stop reason: HOSPADM

## 2025-07-23 RX ORDER — LOSARTAN POTASSIUM 50 MG/1
25 TABLET ORAL ONCE
Status: COMPLETED | OUTPATIENT
Start: 2025-07-23 | End: 2025-07-23

## 2025-07-23 RX ORDER — POLYETHYLENE GLYCOL 3350 17 G/17G
17 POWDER, FOR SOLUTION ORAL DAILY PRN
Status: DISCONTINUED | OUTPATIENT
Start: 2025-07-23 | End: 2025-07-31 | Stop reason: HOSPADM

## 2025-07-23 RX ORDER — ONDANSETRON 2 MG/ML
4 INJECTION INTRAMUSCULAR; INTRAVENOUS ONCE
Status: COMPLETED | OUTPATIENT
Start: 2025-07-23 | End: 2025-07-23

## 2025-07-23 RX ORDER — LOSARTAN POTASSIUM 50 MG/1
25 TABLET ORAL DAILY
Status: DISCONTINUED | OUTPATIENT
Start: 2025-07-23 | End: 2025-07-23

## 2025-07-23 RX ORDER — SODIUM CHLORIDE 0.9 % (FLUSH) 0.9 %
5-40 SYRINGE (ML) INJECTION PRN
Status: DISCONTINUED | OUTPATIENT
Start: 2025-07-23 | End: 2025-07-31 | Stop reason: HOSPADM

## 2025-07-23 RX ORDER — 0.9 % SODIUM CHLORIDE 0.9 %
1000 INTRAVENOUS SOLUTION INTRAVENOUS ONCE
Status: COMPLETED | OUTPATIENT
Start: 2025-07-23 | End: 2025-07-23

## 2025-07-23 RX ORDER — ONDANSETRON 4 MG/1
4 TABLET, ORALLY DISINTEGRATING ORAL EVERY 8 HOURS PRN
Status: DISCONTINUED | OUTPATIENT
Start: 2025-07-23 | End: 2025-07-31 | Stop reason: HOSPADM

## 2025-07-23 RX ORDER — HEPARIN SODIUM 10000 [USP'U]/100ML
5-30 INJECTION, SOLUTION INTRAVENOUS CONTINUOUS
Status: DISCONTINUED | OUTPATIENT
Start: 2025-07-23 | End: 2025-07-24

## 2025-07-23 RX ADMIN — HYDRALAZINE HYDROCHLORIDE 10 MG: 20 INJECTION INTRAMUSCULAR; INTRAVENOUS at 20:32

## 2025-07-23 RX ADMIN — IOPAMIDOL 125 ML: 755 INJECTION, SOLUTION INTRAVENOUS at 17:48

## 2025-07-23 RX ADMIN — HEPARIN SODIUM 18 UNITS/KG/HR: 10000 INJECTION, SOLUTION INTRAVENOUS at 18:19

## 2025-07-23 RX ADMIN — MORPHINE SULFATE 4 MG: 4 INJECTION, SOLUTION INTRAMUSCULAR; INTRAVENOUS at 18:12

## 2025-07-23 RX ADMIN — ONDANSETRON 4 MG: 2 INJECTION, SOLUTION INTRAMUSCULAR; INTRAVENOUS at 18:12

## 2025-07-23 RX ADMIN — HEPARIN SODIUM 5300 UNITS: 1000 INJECTION, SOLUTION INTRAVENOUS; SUBCUTANEOUS at 18:16

## 2025-07-23 RX ADMIN — SODIUM CHLORIDE 1000 ML: 0.9 INJECTION, SOLUTION INTRAVENOUS at 18:10

## 2025-07-23 RX ADMIN — LOSARTAN POTASSIUM 25 MG: 50 TABLET, FILM COATED ORAL at 20:32

## 2025-07-23 RX ADMIN — HYDROMORPHONE HYDROCHLORIDE 0.5 MG: 1 INJECTION, SOLUTION INTRAMUSCULAR; INTRAVENOUS; SUBCUTANEOUS at 19:45

## 2025-07-23 ASSESSMENT — PAIN DESCRIPTION - FREQUENCY: FREQUENCY: CONTINUOUS

## 2025-07-23 ASSESSMENT — PAIN DESCRIPTION - DESCRIPTORS: DESCRIPTORS: ACHING

## 2025-07-23 ASSESSMENT — PAIN DESCRIPTION - LOCATION
LOCATION: LEG

## 2025-07-23 ASSESSMENT — PAIN DESCRIPTION - ORIENTATION
ORIENTATION: LEFT

## 2025-07-23 ASSESSMENT — PAIN SCALES - GENERAL
PAINLEVEL_OUTOF10: 8
PAINLEVEL_OUTOF10: 9
PAINLEVEL_OUTOF10: 8

## 2025-07-23 ASSESSMENT — PAIN DESCRIPTION - PAIN TYPE: TYPE: ACUTE PAIN

## 2025-07-23 ASSESSMENT — PAIN - FUNCTIONAL ASSESSMENT
PAIN_FUNCTIONAL_ASSESSMENT: 0-10
PAIN_FUNCTIONAL_ASSESSMENT: PREVENTS OR INTERFERES SOME ACTIVE ACTIVITIES AND ADLS

## 2025-07-23 ASSESSMENT — PAIN DESCRIPTION - ONSET: ONSET: ON-GOING

## 2025-07-23 NOTE — ED NOTES
Report received at bedside, care taken over by this nurse. Wife at bedside, pt A/Ox4, left foot pale, cold.

## 2025-07-23 NOTE — PROGRESS NOTES
Effort: No respiratory distress.      Breath sounds: Normal breath sounds.   Musculoskeletal:      Cervical back: Normal range of motion.      Comments: Negative Homans' sign bilaterally, negative calf tenderness bilaterally   Skin:     Findings: No rash.      Comments: Left lower extremity from ankle through the entire foot cold and pallorous, weak pulses   Neurological:      General: No focal deficit present.      Mental Status: He is alert and oriented to person, place, and time.   Psychiatric:         Mood and Affect: Mood normal.               Reviewed with the patient: current clinical status, medications, activities and diet.     Side effects, adverse effects of the medication prescribed today, as well as treatment plan/ rationale and result expectations have been discussed with the patient who expresses understanding and desires to proceed.    Close follow up to evaluate treatment results and for coordination of care.  I have reviewed the patient's medical history in detail and updated the computerized patient record.    Lizandro Ruffin MD

## 2025-07-23 NOTE — ED PROVIDER NOTES
[facility] EMERGENCY DEPARTMENT  EMERGENCY DEPARTMENT ENCOUNTER      Pt Name: Garry Gamez  MRN: [id number]  Birthdate 1960  Date of evaluation: 7/23/2025  Provider: Pedro Luis Olsen PA-C  4:21 PM EDT      CHIEF COMPLAINT       Chief Complaint   Patient presents with    Leg Pain     Pt was sent to the ER by his doctors office for no pulse in his LLE,          HISTORY OF PRESENT ILLNESS   (Location/Symptom, Timing/Onset, Context/Setting, Quality, Duration, Modifying Factors, Severity)  Note limiting factors.   Garry Gamez is a 65 y.o. male who presents to the emergency department with PMHx internal iliac artery aneurysm with repair of saccular right common iliac artery aneurysm via endograft, embolism and thrombosis of left femoral vein with left leg claudication and rest pain of left lower extremity concurrent with and due to atherosclerosis of bypass graft who presents to the emergency department with cramping to the left foot and leg with decreased sensation to the foot, cool to the touch and absent pulses are PCP.  Patient states that the symptoms began yesterday when he was working on laying boards on the deck and felt the pain to the leg.  Patient states that overnight approximately 3 AM is when the pain began and has had pain with associated numbness and tingling since then.    HPI    Nursing Notes were reviewed.    REVIEW OF SYSTEMS    (2-9 systems for level 4, 10 or more for level 5)     Review of Systems   Constitutional:  Negative for fever.   Respiratory:  Negative for chest tightness and shortness of breath.    Cardiovascular:  Negative for chest pain.   Skin:  Positive for color change (foot).   Neurological:  Positive for numbness.   All other systems reviewed and are negative.      Except as noted above the remainder of the review of systems was reviewed and negative.       PAST MEDICAL HISTORY     Past Medical History:   Diagnosis Date    Alcoholism (HCC)     Anxiety     Atherosclerosis of

## 2025-07-23 NOTE — ED TRIAGE NOTES
Pt sent to the ER by his doctor for poor circulation in his LLE, c/o LLE cramping after working yesterday, denies trauma, ROM limited Pt is ambulatory with discomfort.

## 2025-07-24 ENCOUNTER — ANESTHESIA (OUTPATIENT)
Dept: OPERATING ROOM | Age: 65
End: 2025-07-24
Payer: COMMERCIAL

## 2025-07-24 ENCOUNTER — ANESTHESIA EVENT (OUTPATIENT)
Dept: OPERATING ROOM | Age: 65
End: 2025-07-24
Payer: COMMERCIAL

## 2025-07-24 ENCOUNTER — APPOINTMENT (OUTPATIENT)
Dept: GENERAL RADIOLOGY | Age: 65
DRG: 181 | End: 2025-07-24
Payer: COMMERCIAL

## 2025-07-24 LAB
ALBUMIN SERPL-MCNC: 4.7 G/DL (ref 3.5–4.6)
ALP SERPL-CCNC: 162 U/L (ref 35–104)
ALT SERPL-CCNC: 53 U/L (ref 0–41)
ANION GAP SERPL CALCULATED.3IONS-SCNC: 15 MEQ/L (ref 9–15)
ANTI-XA UNFRAC HEPARIN: 0.46 IU/ML
ANTI-XA UNFRAC HEPARIN: 0.61 IU/ML
AST SERPL-CCNC: 150 U/L (ref 0–40)
BASOPHILS # BLD: 0 K/UL (ref 0–0.2)
BASOPHILS NFR BLD: 0.3 %
BILIRUB SERPL-MCNC: 1 MG/DL (ref 0.2–0.7)
BUN SERPL-MCNC: 10 MG/DL (ref 8–23)
CALCIUM SERPL-MCNC: 8.9 MG/DL (ref 8.5–9.9)
CHLORIDE SERPL-SCNC: 100 MEQ/L (ref 95–107)
CO2 SERPL-SCNC: 19 MEQ/L (ref 20–31)
CREAT SERPL-MCNC: 0.71 MG/DL (ref 0.7–1.2)
EOSINOPHIL # BLD: 0 K/UL (ref 0–0.7)
EOSINOPHIL NFR BLD: 1.1 %
ERYTHROCYTE [DISTWIDTH] IN BLOOD BY AUTOMATED COUNT: 14.9 % (ref 11.5–14.5)
ERYTHROCYTE [DISTWIDTH] IN BLOOD BY AUTOMATED COUNT: 15.3 % (ref 11.5–14.5)
FIBRINOGEN PPP-MCNC: 329 MG/DL (ref 262–562)
GLOBULIN SER CALC-MCNC: 2.8 G/DL (ref 2.3–3.5)
GLUCOSE SERPL-MCNC: 134 MG/DL (ref 70–99)
HCT VFR BLD AUTO: 38.4 % (ref 42–52)
HCT VFR BLD AUTO: 44.8 % (ref 42–52)
HGB BLD-MCNC: 13.5 G/DL (ref 14–18)
HGB BLD-MCNC: 15.8 G/DL (ref 14–18)
LYMPHOCYTES # BLD: 1.7 K/UL (ref 1–4.8)
LYMPHOCYTES NFR BLD: 11 %
MCH RBC QN AUTO: 31.2 PG (ref 27–31.3)
MCH RBC QN AUTO: 31.8 PG (ref 27–31.3)
MCHC RBC AUTO-ENTMCNC: 35.2 % (ref 33–37)
MCHC RBC AUTO-ENTMCNC: 35.3 % (ref 33–37)
MCV RBC AUTO: 88.5 FL (ref 79–92.2)
MCV RBC AUTO: 90.4 FL (ref 79–92.2)
MONOCYTES # BLD: 1.3 K/UL (ref 0.2–0.8)
MONOCYTES NFR BLD: 7.6 %
NEUTROPHILS # BLD: 13 K/UL (ref 1.4–6.5)
NEUTS SEG NFR BLD: 82 %
PERFORMED ON: ABNORMAL
PLATELET # BLD AUTO: 156 K/UL (ref 130–400)
PLATELET # BLD AUTO: 169 K/UL (ref 130–400)
PLATELET BLD QL SMEAR: ADEQUATE
POC CREATININE: 0.7 MG/DL (ref 0.8–1.3)
POC SAMPLE TYPE: ABNORMAL
POTASSIUM SERPL-SCNC: 3.7 MEQ/L (ref 3.4–4.9)
PROT SERPL-MCNC: 7.5 G/DL (ref 6.3–8)
RBC # BLD AUTO: 4.25 M/UL (ref 4.7–6.1)
RBC # BLD AUTO: 5.06 M/UL (ref 4.7–6.1)
SLIDE REVIEW: ABNORMAL
SODIUM SERPL-SCNC: 134 MEQ/L (ref 135–144)
WBC # BLD AUTO: 15.9 K/UL (ref 4.8–10.8)
WBC # BLD AUTO: 19.2 K/UL (ref 4.8–10.8)

## 2025-07-24 PROCEDURE — 6360000002 HC RX W HCPCS: Performed by: SURGERY

## 2025-07-24 PROCEDURE — 80053 COMPREHEN METABOLIC PANEL: CPT

## 2025-07-24 PROCEDURE — C1757 CATH, THROMBECTOMY/EMBOLECT: HCPCS | Performed by: SURGERY

## 2025-07-24 PROCEDURE — 2500000003 HC RX 250 WO HCPCS: Performed by: SURGERY

## 2025-07-24 PROCEDURE — 2580000003 HC RX 258: Performed by: SURGERY

## 2025-07-24 PROCEDURE — 6360000002 HC RX W HCPCS: Performed by: ANESTHESIOLOGY

## 2025-07-24 PROCEDURE — 85347 COAGULATION TIME ACTIVATED: CPT

## 2025-07-24 PROCEDURE — C1751 CATH, INF, PER/CENT/MIDLINE: HCPCS | Performed by: SURGERY

## 2025-07-24 PROCEDURE — C1725 CATH, TRANSLUMIN NON-LASER: HCPCS | Performed by: SURGERY

## 2025-07-24 PROCEDURE — C1760 CLOSURE DEV, VASC: HCPCS | Performed by: SURGERY

## 2025-07-24 PROCEDURE — 76000 FLUOROSCOPY <1 HR PHYS/QHP: CPT

## 2025-07-24 PROCEDURE — 36415 COLL VENOUS BLD VENIPUNCTURE: CPT

## 2025-07-24 PROCEDURE — B4101ZZ FLUOROSCOPY OF ABDOMINAL AORTA USING LOW OSMOLAR CONTRAST: ICD-10-PCS | Performed by: SURGERY

## 2025-07-24 PROCEDURE — 3E05317 INTRODUCTION OF OTHER THROMBOLYTIC INTO PERIPHERAL ARTERY, PERCUTANEOUS APPROACH: ICD-10-PCS | Performed by: SURGERY

## 2025-07-24 PROCEDURE — C1894 INTRO/SHEATH, NON-LASER: HCPCS | Performed by: SURGERY

## 2025-07-24 PROCEDURE — 2709999900 HC NON-CHARGEABLE SUPPLY: Performed by: SURGERY

## 2025-07-24 PROCEDURE — 3600000014 HC SURGERY LEVEL 4 ADDTL 15MIN: Performed by: SURGERY

## 2025-07-24 PROCEDURE — 85384 FIBRINOGEN ACTIVITY: CPT

## 2025-07-24 PROCEDURE — 0KNT0ZZ RELEASE LEFT LOWER LEG MUSCLE, OPEN APPROACH: ICD-10-PCS | Performed by: SURGERY

## 2025-07-24 PROCEDURE — C1887 CATHETER, GUIDING: HCPCS | Performed by: SURGERY

## 2025-07-24 PROCEDURE — 2000000000 HC ICU R&B

## 2025-07-24 PROCEDURE — 04CN3ZZ EXTIRPATION OF MATTER FROM LEFT POPLITEAL ARTERY, PERCUTANEOUS APPROACH: ICD-10-PCS | Performed by: SURGERY

## 2025-07-24 PROCEDURE — 047N3GZ DILATION OF LEFT POPLITEAL ARTERY WITH FOUR OR MORE INTRALUMINAL DEVICES, PERCUTANEOUS APPROACH: ICD-10-PCS | Performed by: SURGERY

## 2025-07-24 PROCEDURE — 04CL3ZZ EXTIRPATION OF MATTER FROM LEFT FEMORAL ARTERY, PERCUTANEOUS APPROACH: ICD-10-PCS | Performed by: SURGERY

## 2025-07-24 PROCEDURE — C1753 CATH, INTRAVAS ULTRASOUND: HCPCS | Performed by: SURGERY

## 2025-07-24 PROCEDURE — B41G1ZZ FLUOROSCOPY OF LEFT LOWER EXTREMITY ARTERIES USING LOW OSMOLAR CONTRAST: ICD-10-PCS | Performed by: SURGERY

## 2025-07-24 PROCEDURE — 85025 COMPLETE CBC W/AUTO DIFF WBC: CPT

## 2025-07-24 PROCEDURE — 3700000001 HC ADD 15 MINUTES (ANESTHESIA): Performed by: SURGERY

## 2025-07-24 PROCEDURE — 2580000003 HC RX 258: Performed by: STUDENT IN AN ORGANIZED HEALTH CARE EDUCATION/TRAINING PROGRAM

## 2025-07-24 PROCEDURE — 85520 HEPARIN ASSAY: CPT

## 2025-07-24 PROCEDURE — 2500000003 HC RX 250 WO HCPCS: Performed by: ANESTHESIOLOGY

## 2025-07-24 PROCEDURE — B44GZZ3 ULTRASONOGRAPHY OF LEFT LOWER EXTREMITY ARTERIES, INTRAVASCULAR: ICD-10-PCS | Performed by: SURGERY

## 2025-07-24 PROCEDURE — 6360000002 HC RX W HCPCS: Performed by: INTERNAL MEDICINE

## 2025-07-24 PROCEDURE — C1769 GUIDE WIRE: HCPCS | Performed by: SURGERY

## 2025-07-24 PROCEDURE — C1884 EMBOLIZATION PROTECT SYST: HCPCS | Performed by: SURGERY

## 2025-07-24 PROCEDURE — 2500000003 HC RX 250 WO HCPCS

## 2025-07-24 PROCEDURE — 3600000004 HC SURGERY LEVEL 4 BASE: Performed by: SURGERY

## 2025-07-24 PROCEDURE — C1874 STENT, COATED/COV W/DEL SYS: HCPCS | Performed by: SURGERY

## 2025-07-24 PROCEDURE — 6360000002 HC RX W HCPCS: Performed by: NURSE PRACTITIONER

## 2025-07-24 PROCEDURE — 3700000000 HC ANESTHESIA ATTENDED CARE: Performed by: SURGERY

## 2025-07-24 PROCEDURE — 85027 COMPLETE CBC AUTOMATED: CPT

## 2025-07-24 PROCEDURE — 6360000004 HC RX CONTRAST MEDICATION: Performed by: SURGERY

## 2025-07-24 PROCEDURE — 6360000002 HC RX W HCPCS

## 2025-07-24 DEVICE — VIABAHN SX ENDO HEPARIN 18 RO 8MMX15CM 7FR120CM CATH
Type: IMPLANTABLE DEVICE | Site: LEG | Status: FUNCTIONAL
Brand: GORE VIABAHN ENDOPROSTHESIS WITH HEPARIN

## 2025-07-24 RX ORDER — NICOTINE 21 MG/24HR
1 PATCH, TRANSDERMAL 24 HOURS TRANSDERMAL DAILY
Status: DISCONTINUED | OUTPATIENT
Start: 2025-07-24 | End: 2025-07-31 | Stop reason: HOSPADM

## 2025-07-24 RX ORDER — FENTANYL CITRATE 0.05 MG/ML
25 INJECTION, SOLUTION INTRAMUSCULAR; INTRAVENOUS EVERY 5 MIN PRN
Status: DISCONTINUED | OUTPATIENT
Start: 2025-07-24 | End: 2025-07-24

## 2025-07-24 RX ORDER — MAGNESIUM HYDROXIDE 1200 MG/15ML
LIQUID ORAL CONTINUOUS PRN
Status: COMPLETED | OUTPATIENT
Start: 2025-07-24 | End: 2025-07-24

## 2025-07-24 RX ORDER — SODIUM CHLORIDE 0.9 % (FLUSH) 0.9 %
5-40 SYRINGE (ML) INJECTION PRN
Status: DISCONTINUED | OUTPATIENT
Start: 2025-07-24 | End: 2025-07-24

## 2025-07-24 RX ORDER — HEPARIN SODIUM 10000 [USP'U]/100ML
1000 INJECTION, SOLUTION INTRAVENOUS CONTINUOUS
Status: DISCONTINUED | OUTPATIENT
Start: 2025-07-24 | End: 2025-07-26

## 2025-07-24 RX ORDER — FENTANYL CITRATE 50 UG/ML
INJECTION, SOLUTION INTRAMUSCULAR; INTRAVENOUS
Status: DISCONTINUED | OUTPATIENT
Start: 2025-07-24 | End: 2025-07-24 | Stop reason: SDUPTHER

## 2025-07-24 RX ORDER — GABAPENTIN 400 MG/1
400 CAPSULE ORAL 3 TIMES DAILY
Status: DISCONTINUED | OUTPATIENT
Start: 2025-07-24 | End: 2025-07-31 | Stop reason: HOSPADM

## 2025-07-24 RX ORDER — LIDOCAINE HYDROCHLORIDE 10 MG/ML
INJECTION, SOLUTION EPIDURAL; INFILTRATION; INTRACAUDAL; PERINEURAL
Status: DISCONTINUED | OUTPATIENT
Start: 2025-07-24 | End: 2025-07-24 | Stop reason: SDUPTHER

## 2025-07-24 RX ORDER — OXYCODONE HYDROCHLORIDE 5 MG/1
10 TABLET ORAL PRN
Status: DISCONTINUED | OUTPATIENT
Start: 2025-07-24 | End: 2025-07-24

## 2025-07-24 RX ORDER — ONDANSETRON 2 MG/ML
INJECTION INTRAMUSCULAR; INTRAVENOUS
Status: DISCONTINUED | OUTPATIENT
Start: 2025-07-24 | End: 2025-07-24 | Stop reason: SDUPTHER

## 2025-07-24 RX ORDER — DEXAMETHASONE SODIUM PHOSPHATE 10 MG/ML
INJECTION, SOLUTION INTRA-ARTICULAR; INTRALESIONAL; INTRAMUSCULAR; INTRAVENOUS; SOFT TISSUE
Status: DISCONTINUED | OUTPATIENT
Start: 2025-07-24 | End: 2025-07-24 | Stop reason: SDUPTHER

## 2025-07-24 RX ORDER — METOCLOPRAMIDE HYDROCHLORIDE 5 MG/ML
10 INJECTION INTRAMUSCULAR; INTRAVENOUS
Status: DISCONTINUED | OUTPATIENT
Start: 2025-07-24 | End: 2025-07-24

## 2025-07-24 RX ORDER — ASPIRIN 81 MG/1
81 TABLET ORAL DAILY
Status: DISCONTINUED | OUTPATIENT
Start: 2025-07-24 | End: 2025-07-31 | Stop reason: HOSPADM

## 2025-07-24 RX ORDER — DEXTROSE MONOHYDRATE 100 MG/ML
INJECTION, SOLUTION INTRAVENOUS CONTINUOUS PRN
Status: DISCONTINUED | OUTPATIENT
Start: 2025-07-24 | End: 2025-07-24

## 2025-07-24 RX ORDER — HEPARIN SODIUM 1000 [USP'U]/ML
INJECTION, SOLUTION INTRAVENOUS; SUBCUTANEOUS
Status: DISCONTINUED | OUTPATIENT
Start: 2025-07-24 | End: 2025-07-24 | Stop reason: SDUPTHER

## 2025-07-24 RX ORDER — ATORVASTATIN CALCIUM 20 MG/1
20 TABLET, FILM COATED ORAL DAILY
Status: DISCONTINUED | OUTPATIENT
Start: 2025-07-24 | End: 2025-07-31 | Stop reason: HOSPADM

## 2025-07-24 RX ORDER — OXYCODONE HYDROCHLORIDE 5 MG/1
5 TABLET ORAL PRN
Status: DISCONTINUED | OUTPATIENT
Start: 2025-07-24 | End: 2025-07-24

## 2025-07-24 RX ORDER — HYDRALAZINE HYDROCHLORIDE 20 MG/ML
10 INJECTION INTRAMUSCULAR; INTRAVENOUS
Status: DISCONTINUED | OUTPATIENT
Start: 2025-07-24 | End: 2025-07-24

## 2025-07-24 RX ORDER — CEFAZOLIN SODIUM 1 G/3ML
INJECTION, POWDER, FOR SOLUTION INTRAMUSCULAR; INTRAVENOUS
Status: DISCONTINUED | OUTPATIENT
Start: 2025-07-24 | End: 2025-07-24 | Stop reason: SDUPTHER

## 2025-07-24 RX ORDER — EPHEDRINE SULFATE/0.9% NACL/PF 25 MG/5 ML
SYRINGE (ML) INTRAVENOUS
Status: DISCONTINUED | OUTPATIENT
Start: 2025-07-24 | End: 2025-07-24 | Stop reason: SDUPTHER

## 2025-07-24 RX ORDER — PROPOFOL 10 MG/ML
INJECTION, EMULSION INTRAVENOUS
Status: DISCONTINUED | OUTPATIENT
Start: 2025-07-24 | End: 2025-07-24 | Stop reason: SDUPTHER

## 2025-07-24 RX ORDER — GLUCAGON 1 MG/ML
1 KIT INJECTION PRN
Status: DISCONTINUED | OUTPATIENT
Start: 2025-07-24 | End: 2025-07-24

## 2025-07-24 RX ORDER — MIDAZOLAM HYDROCHLORIDE 1 MG/ML
INJECTION, SOLUTION INTRAMUSCULAR; INTRAVENOUS
Status: DISCONTINUED | OUTPATIENT
Start: 2025-07-24 | End: 2025-07-24 | Stop reason: SDUPTHER

## 2025-07-24 RX ORDER — IOPAMIDOL 612 MG/ML
INJECTION, SOLUTION INTRATHECAL PRN
Status: DISCONTINUED | OUTPATIENT
Start: 2025-07-24 | End: 2025-07-24 | Stop reason: ALTCHOICE

## 2025-07-24 RX ORDER — LABETALOL HYDROCHLORIDE 5 MG/ML
10 INJECTION, SOLUTION INTRAVENOUS
Status: DISCONTINUED | OUTPATIENT
Start: 2025-07-24 | End: 2025-07-24

## 2025-07-24 RX ORDER — PANTOPRAZOLE SODIUM 40 MG/1
40 TABLET, DELAYED RELEASE ORAL
Status: DISCONTINUED | OUTPATIENT
Start: 2025-07-24 | End: 2025-07-31 | Stop reason: HOSPADM

## 2025-07-24 RX ORDER — ONDANSETRON 2 MG/ML
4 INJECTION INTRAMUSCULAR; INTRAVENOUS
Status: DISCONTINUED | OUTPATIENT
Start: 2025-07-24 | End: 2025-07-24

## 2025-07-24 RX ORDER — HYDROMORPHONE HYDROCHLORIDE 1 MG/ML
1 INJECTION, SOLUTION INTRAMUSCULAR; INTRAVENOUS; SUBCUTANEOUS EVERY 4 HOURS PRN
Refills: 0 | Status: DISCONTINUED | OUTPATIENT
Start: 2025-07-24 | End: 2025-07-27

## 2025-07-24 RX ORDER — SODIUM CHLORIDE 0.9 % (FLUSH) 0.9 %
5-40 SYRINGE (ML) INJECTION EVERY 12 HOURS SCHEDULED
Status: DISCONTINUED | OUTPATIENT
Start: 2025-07-24 | End: 2025-07-24

## 2025-07-24 RX ORDER — IPRATROPIUM BROMIDE AND ALBUTEROL SULFATE 2.5; .5 MG/3ML; MG/3ML
1 SOLUTION RESPIRATORY (INHALATION)
Status: DISCONTINUED | OUTPATIENT
Start: 2025-07-24 | End: 2025-07-24

## 2025-07-24 RX ORDER — MEPERIDINE HYDROCHLORIDE 25 MG/ML
12.5 INJECTION INTRAMUSCULAR; INTRAVENOUS; SUBCUTANEOUS EVERY 5 MIN PRN
Status: DISCONTINUED | OUTPATIENT
Start: 2025-07-24 | End: 2025-07-24

## 2025-07-24 RX ORDER — ROCURONIUM BROMIDE 10 MG/ML
INJECTION, SOLUTION INTRAVENOUS
Status: DISCONTINUED | OUTPATIENT
Start: 2025-07-24 | End: 2025-07-24 | Stop reason: SDUPTHER

## 2025-07-24 RX ORDER — 0.9 % SODIUM CHLORIDE 0.9 %
500 INTRAVENOUS SOLUTION INTRAVENOUS ONCE
Status: DISCONTINUED | OUTPATIENT
Start: 2025-07-24 | End: 2025-07-31 | Stop reason: HOSPADM

## 2025-07-24 RX ORDER — HEPARIN SODIUM 10000 [USP'U]/100ML
500 INJECTION, SOLUTION INTRAVENOUS CONTINUOUS
Status: DISCONTINUED | OUTPATIENT
Start: 2025-07-24 | End: 2025-07-24

## 2025-07-24 RX ORDER — CLOPIDOGREL BISULFATE 75 MG/1
75 TABLET ORAL DAILY
Status: DISCONTINUED | OUTPATIENT
Start: 2025-07-24 | End: 2025-07-31 | Stop reason: HOSPADM

## 2025-07-24 RX ORDER — EPHEDRINE SULFATE 50 MG/ML
INJECTION, SOLUTION INTRAVENOUS
Status: DISCONTINUED | OUTPATIENT
Start: 2025-07-24 | End: 2025-07-24

## 2025-07-24 RX ORDER — SODIUM CHLORIDE 9 MG/ML
INJECTION, SOLUTION INTRAVENOUS PRN
Status: DISCONTINUED | OUTPATIENT
Start: 2025-07-24 | End: 2025-07-24

## 2025-07-24 RX ADMIN — SUGAMMADEX 200 MG: 100 INJECTION, SOLUTION INTRAVENOUS at 21:58

## 2025-07-24 RX ADMIN — HYDROMORPHONE HYDROCHLORIDE 1 MG: 1 INJECTION, SOLUTION INTRAMUSCULAR; INTRAVENOUS; SUBCUTANEOUS at 16:33

## 2025-07-24 RX ADMIN — FENTANYL CITRATE 25 MCG: 50 INJECTION, SOLUTION INTRAMUSCULAR; INTRAVENOUS at 12:48

## 2025-07-24 RX ADMIN — DEXAMETHASONE SODIUM PHOSPHATE 10 MG: 10 INJECTION INTRAMUSCULAR; INTRAVENOUS at 08:50

## 2025-07-24 RX ADMIN — HEPARIN SODIUM 500 UNITS/HR: 10000 INJECTION, SOLUTION INTRAVENOUS at 16:52

## 2025-07-24 RX ADMIN — SODIUM CHLORIDE 1000 ML: 0.9 INJECTION, SOLUTION INTRAVENOUS at 07:28

## 2025-07-24 RX ADMIN — FENTANYL CITRATE 25 MCG: 50 INJECTION, SOLUTION INTRAMUSCULAR; INTRAVENOUS at 11:04

## 2025-07-24 RX ADMIN — HYDROMORPHONE HYDROCHLORIDE 0.5 MG: 1 INJECTION, SOLUTION INTRAMUSCULAR; INTRAVENOUS; SUBCUTANEOUS at 17:38

## 2025-07-24 RX ADMIN — CEFAZOLIN 2000 MG: 2 INJECTION, POWDER, FOR SOLUTION INTRAMUSCULAR; INTRAVENOUS at 08:53

## 2025-07-24 RX ADMIN — EPHEDRINE SULFATE 10 MG: 5 INJECTION INTRAVENOUS at 21:29

## 2025-07-24 RX ADMIN — HEPARIN SODIUM 3000 UNITS: 1000 INJECTION INTRAVENOUS; SUBCUTANEOUS at 12:55

## 2025-07-24 RX ADMIN — HEPARIN SODIUM 2000 UNITS: 1000 INJECTION INTRAVENOUS; SUBCUTANEOUS at 09:03

## 2025-07-24 RX ADMIN — FENTANYL CITRATE 25 MCG: 50 INJECTION, SOLUTION INTRAMUSCULAR; INTRAVENOUS at 10:56

## 2025-07-24 RX ADMIN — FENTANYL CITRATE 25 MCG: 50 INJECTION, SOLUTION INTRAMUSCULAR; INTRAVENOUS at 09:54

## 2025-07-24 RX ADMIN — SODIUM CHLORIDE: 0.9 INJECTION, SOLUTION INTRAVENOUS at 15:09

## 2025-07-24 RX ADMIN — FENTANYL CITRATE 25 MCG: 50 INJECTION INTRAMUSCULAR; INTRAVENOUS at 17:03

## 2025-07-24 RX ADMIN — SODIUM CHLORIDE: 0.9 INJECTION, SOLUTION INTRAVENOUS at 20:19

## 2025-07-24 RX ADMIN — HYDROMORPHONE HYDROCHLORIDE 0.5 MG: 1 INJECTION, SOLUTION INTRAMUSCULAR; INTRAVENOUS; SUBCUTANEOUS at 03:11

## 2025-07-24 RX ADMIN — HEPARIN SODIUM 3000 UNITS: 1000 INJECTION INTRAVENOUS; SUBCUTANEOUS at 13:45

## 2025-07-24 RX ADMIN — PHENYLEPHRINE HYDROCHLORIDE 0.1 MG: 100 INJECTION INTRAVENOUS at 08:49

## 2025-07-24 RX ADMIN — HEPARIN SODIUM 500 UNITS/HR: 10000 INJECTION, SOLUTION INTRAVENOUS at 23:46

## 2025-07-24 RX ADMIN — FENTANYL CITRATE 25 MCG: 50 INJECTION, SOLUTION INTRAMUSCULAR; INTRAVENOUS at 14:43

## 2025-07-24 RX ADMIN — FENTANYL CITRATE 50 MCG: 50 INJECTION, SOLUTION INTRAMUSCULAR; INTRAVENOUS at 20:38

## 2025-07-24 RX ADMIN — ROCURONIUM BROMIDE 90 MG: 10 INJECTION, SOLUTION INTRAVENOUS at 20:38

## 2025-07-24 RX ADMIN — CEFAZOLIN 2000 MG: 2 INJECTION, POWDER, FOR SOLUTION INTRAMUSCULAR; INTRAVENOUS at 12:57

## 2025-07-24 RX ADMIN — ONDANSETRON 4 MG: 2 INJECTION, SOLUTION INTRAMUSCULAR; INTRAVENOUS at 15:55

## 2025-07-24 RX ADMIN — FENTANYL CITRATE 25 MCG: 50 INJECTION, SOLUTION INTRAMUSCULAR; INTRAVENOUS at 13:09

## 2025-07-24 RX ADMIN — PROPOFOL 150 MG: 10 INJECTION, EMULSION INTRAVENOUS at 20:38

## 2025-07-24 RX ADMIN — NICARDIPINE HYDROCHLORIDE 5 MG/HR: 0.1 INJECTION INTRAVENOUS at 01:44

## 2025-07-24 RX ADMIN — CEFAZOLIN 2 G: 1 INJECTION, POWDER, FOR SOLUTION INTRAMUSCULAR; INTRAVENOUS at 20:57

## 2025-07-24 RX ADMIN — FENTANYL CITRATE 25 MCG: 50 INJECTION, SOLUTION INTRAMUSCULAR; INTRAVENOUS at 12:58

## 2025-07-24 RX ADMIN — PHENYLEPHRINE HYDROCHLORIDE 0.1 MG: 100 INJECTION INTRAVENOUS at 08:57

## 2025-07-24 RX ADMIN — HYDROMORPHONE HYDROCHLORIDE 1 MG: 1 INJECTION, SOLUTION INTRAMUSCULAR; INTRAVENOUS; SUBCUTANEOUS at 03:44

## 2025-07-24 RX ADMIN — EPHEDRINE SULFATE 5 MG: 5 INJECTION INTRAVENOUS at 15:49

## 2025-07-24 RX ADMIN — MIDAZOLAM HYDROCHLORIDE 2 MG: 1 INJECTION, SOLUTION INTRAMUSCULAR; INTRAVENOUS at 08:31

## 2025-07-24 RX ADMIN — PHENYLEPHRINE HYDROCHLORIDE 0.1 MG: 100 INJECTION INTRAVENOUS at 08:41

## 2025-07-24 RX ADMIN — LIDOCAINE HYDROCHLORIDE 50 MG: 10 INJECTION, SOLUTION EPIDURAL; INFILTRATION; INTRACAUDAL; PERINEURAL at 08:40

## 2025-07-24 RX ADMIN — HEPARIN SODIUM 3000 UNITS: 1000 INJECTION INTRAVENOUS; SUBCUTANEOUS at 11:29

## 2025-07-24 RX ADMIN — ALTEPLASE 1 MG/HR: 2.2 INJECTION, POWDER, LYOPHILIZED, FOR SOLUTION INTRAVENOUS at 15:50

## 2025-07-24 RX ADMIN — Medication 10 ML: at 23:47

## 2025-07-24 RX ADMIN — HEPARIN SODIUM 3000 UNITS: 1000 INJECTION INTRAVENOUS; SUBCUTANEOUS at 15:24

## 2025-07-24 RX ADMIN — FENTANYL CITRATE 50 MCG: 50 INJECTION, SOLUTION INTRAMUSCULAR; INTRAVENOUS at 21:54

## 2025-07-24 RX ADMIN — EPHEDRINE SULFATE 10 MG: 5 INJECTION INTRAVENOUS at 21:34

## 2025-07-24 RX ADMIN — SODIUM CHLORIDE: 0.9 INJECTION, SOLUTION INTRAVENOUS at 09:34

## 2025-07-24 RX ADMIN — EPHEDRINE SULFATE 5 MG: 5 INJECTION INTRAVENOUS at 12:21

## 2025-07-24 RX ADMIN — PHENYLEPHRINE HYDROCHLORIDE 0.2 MG: 100 INJECTION INTRAVENOUS at 20:42

## 2025-07-24 RX ADMIN — HEPARIN SODIUM 2000 UNITS: 1000 INJECTION INTRAVENOUS; SUBCUTANEOUS at 09:14

## 2025-07-24 RX ADMIN — PHENYLEPHRINE HYDROCHLORIDE 0.1 MG: 100 INJECTION INTRAVENOUS at 21:16

## 2025-07-24 RX ADMIN — PHENYLEPHRINE HYDROCHLORIDE 0.1 MG: 100 INJECTION INTRAVENOUS at 09:00

## 2025-07-24 RX ADMIN — HEPARIN SODIUM 3000 UNITS: 1000 INJECTION INTRAVENOUS; SUBCUTANEOUS at 11:12

## 2025-07-24 RX ADMIN — FENTANYL CITRATE 25 MCG: 50 INJECTION, SOLUTION INTRAMUSCULAR; INTRAVENOUS at 09:42

## 2025-07-24 RX ADMIN — EPHEDRINE SULFATE 5 MG: 5 INJECTION INTRAVENOUS at 21:37

## 2025-07-24 RX ADMIN — EPHEDRINE SULFATE 5 MG: 5 INJECTION INTRAVENOUS at 09:06

## 2025-07-24 RX ADMIN — PROPOFOL 100 MG: 10 INJECTION, EMULSION INTRAVENOUS at 08:40

## 2025-07-24 ASSESSMENT — PAIN SCALES - GENERAL
PAINLEVEL_OUTOF10: 10
PAINLEVEL_OUTOF10: 10
PAINLEVEL_OUTOF10: 0
PAINLEVEL_OUTOF10: 10
PAINLEVEL_OUTOF10: 8

## 2025-07-24 ASSESSMENT — PAIN DESCRIPTION - ORIENTATION
ORIENTATION: RIGHT;LEFT
ORIENTATION: RIGHT;LEFT

## 2025-07-24 ASSESSMENT — PAIN DESCRIPTION - LOCATION
LOCATION: LEG;BACK
LOCATION: BACK;LEG
LOCATION: BACK

## 2025-07-24 ASSESSMENT — PAIN - FUNCTIONAL ASSESSMENT
PAIN_FUNCTIONAL_ASSESSMENT: PREVENTS OR INTERFERES SOME ACTIVE ACTIVITIES AND ADLS
PAIN_FUNCTIONAL_ASSESSMENT: PREVENTS OR INTERFERES SOME ACTIVE ACTIVITIES AND ADLS

## 2025-07-24 ASSESSMENT — LIFESTYLE VARIABLES
SMOKING_STATUS: 1
SMOKING_STATUS: 1

## 2025-07-24 ASSESSMENT — PAIN DESCRIPTION - DESCRIPTORS
DESCRIPTORS: ACHING;DISCOMFORT
DESCRIPTORS: ACHING;DISCOMFORT

## 2025-07-24 NOTE — ED NOTES
Pt states he did not take his BP medication today,  notified. Dawood served hospitalist NP for med rec.

## 2025-07-24 NOTE — CONSULTS
Vascular Consult      Name: Garry Gamez  MRN: [id number]       Physician Requesting Consult: Dr. Callejas    Reason for Consult:   : Left leg    Chief Complaint:     Left lower extremity pain    History of Present Illness:      Garry Gamez is a 65 y.o.  male who presents with acute left lower extremity thrombosis of a previous left femoral to below-knee popliteal bypass with PTFE.  Patient states he was installing a deck at his home on July 22 when he noticed increased pain in his calf.  This did not get any better for over 24 hours and he presented to the ER.    The patient has failed to follow-up for scheduled surveillance of his bypass graft since January 2025.    Past Medical History:     Past Medical History:   Diagnosis Date    Alcoholism (HCC)     Anxiety     Atherosclerosis of native artery of left lower extremity with intermittent claudication     Depression     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)     GERD (gastroesophageal reflux disease)     Hx of blood clots     Hyperlipidemia     Hypertension     PVD (peripheral vascular disease)     Spinal stenosis at L4-L5 level         Past Surgical History:     Past Surgical History:   Procedure Laterality Date    ABDOMINAL AORTIC ANEURYSM REPAIR, ENDOVASCULAR Right 8/15/2024    COVERED TUBE STENT FOR COMMON AND EXTERNAL ILIAC ARTERIES RIGHT. performed by Naomi Gallardo MD at [facility]    FEMORAL BYPASS Left 01/04/2024    LEFT FEMORAL TO POPLITEAL BYPASS WITH VEIN VS. SYNTHETIC BYPASS GRAPH  T&S ON ARRIVAL AND ALL OTHER LABS & H&P IN SYSTEM  C-ARM performed by Naomi Gallardo MD at [facility]    TONSILLECTOMY      VASCULAR SURGERY Right 07/05/2024    RIGHT INTERNAL ILIAC ARTERY COILING, RIGHT GROIN APPROACH. performed by Naomi Gallardo MD at [facility]        Medications Prior to Admission:       Prior to Admission medications    Medication Sig Start Date End Date Taking? Authorizing Provider   gabapentin (NEURONTIN) 400 MG capsule

## 2025-07-24 NOTE — ANESTHESIA POSTPROCEDURE EVALUATION
Department of Anesthesiology  Postprocedure Note    Patient: Garry Gamez  MRN: [id number]  YOB: 1960  Date of evaluation: 7/24/2025    Procedure Summary       Date: 07/24/25 Room / Location: [address]    Anesthesia Start: 0830 Anesthesia Stop: 1627    Procedure: LEFT LOWER EXTREMITY ANGIOGRAM, LEFT LOWER  EXTREMITY EMBOLECTOMY; LYSIS ATHERECTOMY,  ANGIOPLASTY, AND  SFA popliteal and tibial peroneal STENT (Left: Leg Lower) Diagnosis:       Acute occlusion of artery of lower extremity      (Acute occlusion of artery of lower extremity [I70.209])    Surgeons: Naomi Gallardo MD Responsible Provider: Kev Owen MD    Anesthesia Type: general ASA Status: 3 - Emergent            Anesthesia Type: No value filed.    Tony Phase I: Tony Score: 10    Tony Phase II:      Anesthesia Post Evaluation    Patient location during evaluation: ICU  Patient participation: complete - patient participated  Level of consciousness: awake and alert  Airway patency: patent  Nausea & Vomiting: no nausea and no vomiting  Cardiovascular status: blood pressure returned to baseline and hemodynamically stable  Respiratory status: acceptable  Hydration status: euvolemic  Pain management: adequate        No notable events documented.

## 2025-07-24 NOTE — PROGRESS NOTES
On postop check patient was noted to develop a left lower extremity compartment syndrome in both the superficial and deep posterior compartments as well as the anterior and anterior lateral compartments.  Risk and benefits of 4 compartment fasciotomy were discussed with the patient and family and they have agreed to consent to the procedure.  Given the need for fasciotomies the lytic therapy will no longer be possible as he will have profound bleeding from the fasciotomies with lytic therapy.  Patient and family understands the patient will likely need amputation at some point possibly on this admission given the need to stop lysis due to the his compartment syndrome.

## 2025-07-24 NOTE — H&P
DEPARTMENT OF HOSPITAL MEDICINE    HISTORY AND PHYSICAL    PATIENT NAME:  Garry Gamez    MRN:  [id number]  SERVICE DATE:  7/23/2025   SERVICE TIME:  8:02 PM    Primary Care Physician: Lizandro Ruffin MD     SUBJECTIVE  CHIEF COMPLAINT:    Chief Complaint   Patient presents with    Leg Pain     Pt was sent to the ER by his doctors office for no pulse in his LLE,      HPI:  This is a 65 y.o. male with PMH of HTN, HLD, PAD, and depression who presents with acute severe LLE pain. Pt reports he was woken up at 3AM this morning due to severe pain in his left foot. He presents made an appointment with his PCP who evaluated and had concern for an acute arterial occlusion of his LLE and recommended he present to the ED for further evaluation. Pt reports history of an acute femoral artery occlusion and is s/p fem-pop bypass in his LLE. CTA with runoff was completed in the ED showing occlusion of his fem-pop bypass with little to no flow at the level of the popliteal artery and distally. Vascular surgery was contacted by the ED physician who recommended heparin gtt and NPO after midnight with plan for surgical intervention in the AM. Medicine was contacted to help with admission. Pt reports he continues to smoke 0.5-1 ppd. He reports taking his DAPT and statin without issue. He denies fevers, chills, chest pain, shortness of breath, abdominal pain, or N/V.    PAST MEDICAL HISTORY:    Past Medical History:   Diagnosis Date    Alcoholism (HCC)     Anxiety     Atherosclerosis of native artery of left lower extremity with intermittent claudication     Depression     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)     GERD (gastroesophageal reflux disease)     Hx of blood clots     Hyperlipidemia     Hypertension     PVD (peripheral vascular disease)     Spinal stenosis at L4-L5 level      PAST SURGICAL HISTORY:    Past Surgical History:   Procedure Laterality Date    ABDOMINAL AORTIC ANEURYSM REPAIR,

## 2025-07-24 NOTE — ANESTHESIA PRE PROCEDURE
Department of Anesthesiology  Preprocedure Note       Name:  Garry Gamez   Age:  65 y.o.  :  1960                                          MRN:  [id number]         Date:  2025      Surgeon: Surgeon(s):  Naomi Gallardo MD    Procedure: Procedure(s):  LEFT LOWER EXTREMITY ANGIOGRAM POSSIBLE LEFT LOWER  EXTREMITY EMBOLECTOMY POSSIBLE LYSIS ATHERECTOMY POSSIBLE ANGIOPLASTY POSSIBLE  STENT ROOM ICU:7    Medications prior to admission:   Prior to Admission medications    Medication Sig Start Date End Date Taking? Authorizing Provider   gabapentin (NEURONTIN) 400 MG capsule Take 1 capsule by mouth 3 times daily for 90 days. 25 Yes Lizandro Ruffin MD   vitamin D (CHOLECALCIFEROL) 31477 UNIT CAPS Take one capsule by mouth every 5 days 25  Yes Lizandro Ruffin MD   amLODIPine (NORVASC) 5 MG tablet Take 1 tablet by mouth daily 25 Yes Lizandro Ruffin MD   atorvastatin (LIPITOR) 20 MG tablet Take 1 tablet by mouth daily 25  Yes Lizandro Ruffin MD   losartan (COZAAR) 25 MG tablet Take 1 tablet by mouth daily 25  Yes Lizandro Ruffin MD   sertraline (ZOLOFT) 50 MG tablet Take 1 tablet by mouth daily 25  Yes Lizandro Ruffin MD   pantoprazole (PROTONIX) 40 MG tablet Take 1 tablet by mouth every morning (before breakfast) 24  Yes Lucila Caputo MD   albuterol sulfate HFA (VENTOLIN HFA) 108 (90 Base) MCG/ACT inhaler Inhale 2 puffs into the lungs every 6 hours as needed for Wheezing 24  Yes Lizandro Ruffin MD   clopidogrel (PLAVIX) 75 MG tablet Take 1 tablet by mouth daily 24  Yes Naomi Gallardo MD   ASPIRIN 81 PO Take 81 mg by mouth daily   Yes Jay Gu MD   Multiple Vitamin (MULTIVITAMIN) TABS tablet Take 1 tablet by mouth daily 2/15/22  Yes John Cordero MD   sertraline (ZOLOFT) 25 MG tablet TAKE 2 TABLETS BY MOUTH DAILY 24   Lizandro Ruffin MD   sildenafil (VIAGRA) 50 MG tablet Take

## 2025-07-24 NOTE — PLAN OF CARE
Problem: Discharge Planning  Goal: Discharge to home or other facility with appropriate resources  Outcome: Progressing     Problem: Pain  Goal: Verbalizes/displays adequate comfort level or baseline comfort level  Outcome: Progressing     Problem: Safety - Adult  Goal: Free from fall injury  Outcome: Progressing     Problem: Skin/Tissue Integrity  Goal: Skin integrity remains intact  Description: 1.  Monitor for areas of redness and/or skin breakdown  2.  Assess vascular access sites hourly  3.  Every 4-6 hours minimum:  Change oxygen saturation probe site  4.  Every 4-6 hours:  If on nasal continuous positive airway pressure, respiratory therapy assess nares and determine need for appliance change or resting period  Outcome: Progressing     Problem: Chronic Conditions and Co-morbidities  Goal: Patient's chronic conditions and co-morbidity symptoms are monitored and maintained or improved  Outcome: Progressing

## 2025-07-24 NOTE — OP NOTE
Operative Note      Patient: Garry Gamez  YOB: 1960  MRN: [id number]    Date of Procedure: 7/24/2025    Pre-op diagnosis:  1.  Acute left lower extremity ischemia  2.  Thrombosed left femoral-popliteal bypass graft  3.  Thrombosed left anterior tibial, posterior tibial, peroneal arteries    Post-Op Diagnosis: Same    Procedure:  1.  Aortogram with left lower extremity runoff  2.  Embolectomy of left tibioperoneal trunk with Atrix device  3.  Embolectomy left femoral to below-knee popliteal bypass with Atrix device  4.  Intravascular ultrasound left tibioperoneal trunk  5.  Intravascular ultrasound left femoral to below-knee popliteal bypass  6.  Stenting of left femoral to below-knee popliteal bypass with Viabahn stents measuring 6 x 250, 7 x 150, 7 x 150, 8 x 100  7.  Embolectomy of left profundofemoral artery  8.  Initiation of thrombolytic therapy       Surgeon(s):  Naomi Gallardo MD    Assistant:   First Assistant: Gabriele Bray; Nohemi Hawkins; Viki Ceja    Anesthesia: General    Estimated Blood Loss (mL): 200     Complications: None    Specimens:   * No specimens in log *    Implants:  Implant Name Type Inv. Item Serial No.  Lot No. LRB No. Used Action   STENT ENDOPROS L25CM DIA6MM CATH 7FR L120CM 0.035IN VES - [id number] Peripheral stents STENT ENDOPROS L25CM DIA6MM CATH 7FR L120CM 0.035IN VES [id number]  [employer] [facility]-  Left 1 Implanted   GRAFT EVAR L150MM DIA7MM CATH 7FR L120CM GWIRE 0.035IN HEP - [id number] Peripheral stents GRAFT EVAR L150MM DIA7MM CATH 7FR L120CM GWIRE 0.035IN HEP [id number]  [employer] [facility]-  Left 1 Implanted   STENT PERIPH L150MM DIA8MM CATH L120CM DIA7FR 0.035IN HEP - [id number] Peripheral stents STENT PERIPH L150MM DIA8MM CATH L120CM DIA7FR 0.035IN HEP [id number]  [employer] [facility]-  Left 1 Implanted   STENT PERIPH L100MM DIA8MM CATH L120CM DIA7FR 0.035IN HEP - [id number] Peripheral stents STENT PERIPH L100MM DIA8MM

## 2025-07-24 NOTE — ANESTHESIA PROCEDURE NOTES
Arterial Line:    An arterial line was placed using surface landmarks, in the pre-op for the following indication(s): continuous blood pressure monitoring and blood sampling needed.    A 20 gauge (size), 1 and 3/4 inch (length), Arrow (type) catheter was placed, Seldinger technique used, into the left radial artery, secured by tape and Tegaderm.  Anesthesia type: Local  Local infiltration: Injection    Events:  patient tolerated procedure well with no complications.7/24/2025 8:10 AM7/24/2025 8:16 AM  Anesthesiologist: Kev Owen MD  Performed: Anesthesiologist   Preanesthetic Checklist  Completed: patient identified, IV checked, site marked, risks and benefits discussed, surgical/procedural consents, equipment checked, pre-op evaluation, timeout performed, anesthesia consent given, oxygen available and monitors applied/VS acknowledged

## 2025-07-24 NOTE — CARE COORDINATION
Case Management Assessment  Initial Evaluation    Date/Time of Evaluation: 7/24/2025 1:30 PM  Assessment Completed by: ROSA Carmona    If patient is discharged prior to next notation, then this note serves as note for discharge by case management.    Patient Name: Garry Gamez                   YOB: 1960  Diagnosis: Femoral artery occlusion, left [I70.202]  Acute occlusion of artery of lower extremity [I70.209]                   Date / Time: 7/23/2025  4:06 PM    Patient Admission Status: Inpatient   Readmission Risk (Low < 19, Mod (19-27), High > 27): Readmission Risk Score: 12.3    Current PCP: Lizandro Ruffin MD  PCP verified by CM? Yes    Chart Reviewed: Yes      History Provided by: Patient, Significant Other  Patient Orientation: Alert and Oriented, Person, Place, Situation, Self    Patient Cognition: Alert    Hospitalization in the last 30 days (Readmission):  No    If yes, Readmission Assessment in  Navigator will be completed.    Advance Directives:      Code Status: Full Code   Patient's Primary Decision Maker is: Legal Next of Kin      Discharge Planning:    Patient lives with: Friends, Family Members Type of Home: House  Primary Care Giver: Self  Patient Support Systems include: Spouse/Significant Other, Family Members   Current Financial resources:    Current community resources:    Current services prior to admission: None            Current DME:              Type of Home Care services:  None    ADLS  Prior functional level: Independent in ADLs/IADLs  Current functional level: Other (see comment) (TO BE DETERMINED S/P SURGERY)    PT AM-PAC:   /24  OT AM-PAC:   /24    Family can provide assistance at DC: Yes  Would you like Case Management to discuss the discharge plan with any other family members/significant others, and if so, who? No  Plans to Return to Present Housing: Yes  Other Identified Issues/Barriers to RETURNING to current housing: MEDICAL COMPLICATIONS

## 2025-07-24 NOTE — PROGRESS NOTES
Shift Summary     Transfer from ER 2115 2200 Admission completed. Assessments completed. Pt with no c/o of pain at this time. Aox4.  Left lower extremity cool to the touch, left great toe cold with discoloration at the tip. Absent with doppler left popliteal, tibial, pedal pulse.  Sensation intact.  Family at bedside.     0000: negative for changes to assessment, pt resting at this time. Pt Hypertensive in the 170's. Provider notified see new orders.  Cardene drip started.  Xa drawn.  Xa results 0.61 with no changes to the heparin drip at this time.     0300: Pt awake c/o of 10/10 pain to left leg with calf tenderness. Provider notified.  Pt medicated with PRN Dilaudid order. Barrier hugger applied to aide in perfusion.  Pulses doppler- absent tibial and pedal.  Popliteal doppler with a weak pulse.  Cardene drip titrated down as per order.     0500; Labs sent. XA 0.46. Pain improved.     0645: Pt escorted to Surgery.

## 2025-07-24 NOTE — PROGRESS NOTES
Shift summary:    Pt arrived to ICU from OR. Left leg appears mottled and pt unable to move toes.     1802 Pt right groin has a sheath in place. Upon assessment groin site appears edematous and pt is complaining of pain. Call placed to OR 2 and surgeon stated to obtain pt's labs.     1855 Call placed to Dr. Garcia regarding pt's groin site. No new orders at this time.     1915 Dr. Gallardo at bedside. Pt to go back to OR.     Electronically signed by Jana Morales RN on 7/24/2025 at 7:48 PM

## 2025-07-25 PROBLEM — I70.209: Status: ACTIVE | Noted: 2025-07-25

## 2025-07-25 LAB
ALBUMIN SERPL-MCNC: 3.4 G/DL (ref 3.5–4.6)
ALP SERPL-CCNC: 104 U/L (ref 35–104)
ALT SERPL-CCNC: 108 U/L (ref 0–41)
ANION GAP SERPL CALCULATED.3IONS-SCNC: 10 MEQ/L (ref 9–15)
ANTI-XA UNFRAC HEPARIN: 0.12 IU/ML
ANTI-XA UNFRAC HEPARIN: 0.2 IU/ML
AST SERPL-CCNC: 313 U/L (ref 0–40)
BILIRUB SERPL-MCNC: 0.6 MG/DL (ref 0.2–0.7)
BUN SERPL-MCNC: 14 MG/DL (ref 8–23)
CALCIUM SERPL-MCNC: 7.6 MG/DL (ref 8.5–9.9)
CHLORIDE SERPL-SCNC: 103 MEQ/L (ref 95–107)
CO2 SERPL-SCNC: 20 MEQ/L (ref 20–31)
CREAT SERPL-MCNC: 0.57 MG/DL (ref 0.7–1.2)
ERYTHROCYTE [DISTWIDTH] IN BLOOD BY AUTOMATED COUNT: 14.9 % (ref 11.5–14.5)
ERYTHROCYTE [DISTWIDTH] IN BLOOD BY AUTOMATED COUNT: 15.2 % (ref 11.5–14.5)
ERYTHROCYTE [DISTWIDTH] IN BLOOD BY AUTOMATED COUNT: 15.3 % (ref 11.5–14.5)
ERYTHROCYTE [DISTWIDTH] IN BLOOD BY AUTOMATED COUNT: 15.3 % (ref 11.5–14.5)
ERYTHROCYTE [DISTWIDTH] IN BLOOD BY AUTOMATED COUNT: 15.4 % (ref 11.5–14.5)
GLOBULIN SER CALC-MCNC: 2.3 G/DL (ref 2.3–3.5)
GLUCOSE SERPL-MCNC: 122 MG/DL (ref 70–99)
HCT VFR BLD AUTO: 28.5 % (ref 42–52)
HCT VFR BLD AUTO: 28.6 % (ref 42–52)
HCT VFR BLD AUTO: 30.8 % (ref 42–52)
HCT VFR BLD AUTO: 33.4 % (ref 42–52)
HCT VFR BLD AUTO: 34.2 % (ref 42–52)
HGB BLD-MCNC: 10 G/DL (ref 14–18)
HGB BLD-MCNC: 10.1 G/DL (ref 14–18)
HGB BLD-MCNC: 10.6 G/DL (ref 14–18)
HGB BLD-MCNC: 11.7 G/DL (ref 14–18)
HGB BLD-MCNC: 11.9 G/DL (ref 14–18)
MCH RBC QN AUTO: 31.1 PG (ref 27–31.3)
MCH RBC QN AUTO: 31.1 PG (ref 27–31.3)
MCH RBC QN AUTO: 31.3 PG (ref 27–31.3)
MCH RBC QN AUTO: 31.3 PG (ref 27–31.3)
MCH RBC QN AUTO: 32.2 PG (ref 27–31.3)
MCHC RBC AUTO-ENTMCNC: 34.4 % (ref 33–37)
MCHC RBC AUTO-ENTMCNC: 34.8 % (ref 33–37)
MCHC RBC AUTO-ENTMCNC: 35 % (ref 33–37)
MCHC RBC AUTO-ENTMCNC: 35 % (ref 33–37)
MCHC RBC AUTO-ENTMCNC: 35.4 % (ref 33–37)
MCV RBC AUTO: 89.3 FL (ref 79–92.2)
MCV RBC AUTO: 89.3 FL (ref 79–92.2)
MCV RBC AUTO: 89.4 FL (ref 79–92.2)
MCV RBC AUTO: 90.3 FL (ref 79–92.2)
MCV RBC AUTO: 90.8 FL (ref 79–92.2)
PLATELET # BLD AUTO: 115 K/UL (ref 130–400)
PLATELET # BLD AUTO: 119 K/UL (ref 130–400)
PLATELET # BLD AUTO: 123 K/UL (ref 130–400)
PLATELET # BLD AUTO: 139 K/UL (ref 130–400)
PLATELET # BLD AUTO: 142 K/UL (ref 130–400)
POTASSIUM SERPL-SCNC: 4.3 MEQ/L (ref 3.4–4.9)
PROCALCITONIN SERPL IA-MCNC: 0.1 NG/ML (ref 0–0.15)
PROT SERPL-MCNC: 5.7 G/DL (ref 6.3–8)
RBC # BLD AUTO: 3.14 M/UL (ref 4.7–6.1)
RBC # BLD AUTO: 3.2 M/UL (ref 4.7–6.1)
RBC # BLD AUTO: 3.41 M/UL (ref 4.7–6.1)
RBC # BLD AUTO: 3.74 M/UL (ref 4.7–6.1)
RBC # BLD AUTO: 3.83 M/UL (ref 4.7–6.1)
SODIUM SERPL-SCNC: 133 MEQ/L (ref 135–144)
WBC # BLD AUTO: 17.2 K/UL (ref 4.8–10.8)
WBC # BLD AUTO: 19.3 K/UL (ref 4.8–10.8)
WBC # BLD AUTO: 21.3 K/UL (ref 4.8–10.8)
WBC # BLD AUTO: 24.5 K/UL (ref 4.8–10.8)
WBC # BLD AUTO: 24.9 K/UL (ref 4.8–10.8)

## 2025-07-25 PROCEDURE — 6370000000 HC RX 637 (ALT 250 FOR IP): Performed by: INTERNAL MEDICINE

## 2025-07-25 PROCEDURE — 85520 HEPARIN ASSAY: CPT

## 2025-07-25 PROCEDURE — 99223 1ST HOSP IP/OBS HIGH 75: CPT | Performed by: INTERNAL MEDICINE

## 2025-07-25 PROCEDURE — 6360000002 HC RX W HCPCS: Performed by: SURGERY

## 2025-07-25 PROCEDURE — 6370000000 HC RX 637 (ALT 250 FOR IP): Performed by: SURGERY

## 2025-07-25 PROCEDURE — 2000000000 HC ICU R&B

## 2025-07-25 PROCEDURE — 36415 COLL VENOUS BLD VENIPUNCTURE: CPT

## 2025-07-25 PROCEDURE — 2700000000 HC OXYGEN THERAPY PER DAY

## 2025-07-25 PROCEDURE — 84145 PROCALCITONIN (PCT): CPT

## 2025-07-25 PROCEDURE — 85027 COMPLETE CBC AUTOMATED: CPT

## 2025-07-25 PROCEDURE — 80053 COMPREHEN METABOLIC PANEL: CPT

## 2025-07-25 PROCEDURE — 2500000003 HC RX 250 WO HCPCS: Performed by: SURGERY

## 2025-07-25 RX ORDER — LOSARTAN POTASSIUM 25 MG/1
25 TABLET ORAL DAILY
Status: DISCONTINUED | OUTPATIENT
Start: 2025-07-25 | End: 2025-07-31

## 2025-07-25 RX ORDER — AMLODIPINE BESYLATE 5 MG/1
5 TABLET ORAL DAILY
Status: DISCONTINUED | OUTPATIENT
Start: 2025-07-25 | End: 2025-07-30

## 2025-07-25 RX ADMIN — HYDROMORPHONE HYDROCHLORIDE 1 MG: 1 INJECTION, SOLUTION INTRAMUSCULAR; INTRAVENOUS; SUBCUTANEOUS at 17:20

## 2025-07-25 RX ADMIN — ATORVASTATIN CALCIUM 20 MG: 20 TABLET, FILM COATED ORAL at 07:55

## 2025-07-25 RX ADMIN — GABAPENTIN 400 MG: 400 CAPSULE ORAL at 13:36

## 2025-07-25 RX ADMIN — AMLODIPINE BESYLATE 5 MG: 5 TABLET ORAL at 09:52

## 2025-07-25 RX ADMIN — ACETAMINOPHEN 650 MG: 325 TABLET ORAL at 16:22

## 2025-07-25 RX ADMIN — Medication 10 ML: at 21:48

## 2025-07-25 RX ADMIN — HYDROMORPHONE HYDROCHLORIDE 1 MG: 1 INJECTION, SOLUTION INTRAMUSCULAR; INTRAVENOUS; SUBCUTANEOUS at 00:42

## 2025-07-25 RX ADMIN — HYDROMORPHONE HYDROCHLORIDE 1 MG: 1 INJECTION, SOLUTION INTRAMUSCULAR; INTRAVENOUS; SUBCUTANEOUS at 05:27

## 2025-07-25 RX ADMIN — GABAPENTIN 400 MG: 400 CAPSULE ORAL at 07:54

## 2025-07-25 RX ADMIN — HYDROMORPHONE HYDROCHLORIDE 1 MG: 1 INJECTION, SOLUTION INTRAMUSCULAR; INTRAVENOUS; SUBCUTANEOUS at 21:47

## 2025-07-25 RX ADMIN — ASPIRIN 81 MG: 81 TABLET, DELAYED RELEASE ORAL at 07:54

## 2025-07-25 RX ADMIN — Medication 10 ML: at 07:59

## 2025-07-25 RX ADMIN — PANTOPRAZOLE SODIUM 40 MG: 40 TABLET, DELAYED RELEASE ORAL at 05:27

## 2025-07-25 RX ADMIN — LOSARTAN POTASSIUM 25 MG: 25 TABLET, FILM COATED ORAL at 09:52

## 2025-07-25 RX ADMIN — HYDROMORPHONE HYDROCHLORIDE 1 MG: 1 INJECTION, SOLUTION INTRAMUSCULAR; INTRAVENOUS; SUBCUTANEOUS at 13:36

## 2025-07-25 RX ADMIN — SERTRALINE 50 MG: 50 TABLET, FILM COATED ORAL at 07:55

## 2025-07-25 RX ADMIN — GABAPENTIN 400 MG: 400 CAPSULE ORAL at 21:46

## 2025-07-25 RX ADMIN — ACETAMINOPHEN 650 MG: 325 TABLET ORAL at 07:54

## 2025-07-25 RX ADMIN — CLOPIDOGREL BISULFATE 75 MG: 75 TABLET, FILM COATED ORAL at 07:55

## 2025-07-25 RX ADMIN — HYDROMORPHONE HYDROCHLORIDE 1 MG: 1 INJECTION, SOLUTION INTRAMUSCULAR; INTRAVENOUS; SUBCUTANEOUS at 09:47

## 2025-07-25 ASSESSMENT — PAIN DESCRIPTION - DESCRIPTORS
DESCRIPTORS: ACHING;DISCOMFORT
DESCRIPTORS: ACHING;DISCOMFORT
DESCRIPTORS: ACHING
DESCRIPTORS: ACHING
DESCRIPTORS: ACHING;DISCOMFORT
DESCRIPTORS: ACHING;DISCOMFORT

## 2025-07-25 ASSESSMENT — PAIN DESCRIPTION - ORIENTATION
ORIENTATION: LEFT

## 2025-07-25 ASSESSMENT — PAIN - FUNCTIONAL ASSESSMENT

## 2025-07-25 ASSESSMENT — PAIN SCALES - GENERAL
PAINLEVEL_OUTOF10: 7
PAINLEVEL_OUTOF10: 8
PAINLEVEL_OUTOF10: 4
PAINLEVEL_OUTOF10: 7
PAINLEVEL_OUTOF10: 2
PAINLEVEL_OUTOF10: 7
PAINLEVEL_OUTOF10: 3

## 2025-07-25 ASSESSMENT — PAIN DESCRIPTION - LOCATION
LOCATION: LEG
LOCATION: LEG;BACK
LOCATION: LEG
LOCATION: LEG
LOCATION: KNEE;LEG
LOCATION: LEG

## 2025-07-25 NOTE — PROGRESS NOTES
Shift Summary    1900: Bedside report from Jana Clark.  During hand-off pt noted to have soft hematoma and swelling to right groin sheath area.  Left lower calf swollen and painful. Left foot cold and dusky, unable to doppler pulses below the popliteal.  Dr. Gallardo notified.     1920: Dr. Gallardo at bedside, family present also. Dicussed with pt and family returning to OR for further intervention d/t compartment syndrome.  Family agree and son signed consents. Pt to return to OR this evening.     2031: Taken to OR     2239: Pt returned from OR. Pt Alert and responding. Right groin sheath removed with improved swelling to the area.  Left lower calf dressing dry and intact. Left foot coloring improved, cool to the touch,  Pedal pulse remains absent at this time.  Family at bedside.    2243: Paged Dr. Gallardo to clarify ordered to restart heparin drip.  Per Dr. Gallardo restart heparin drip at 500 units per hour with no titration.     0000:  + sensation to left foot, remains cool to the touch. Pt denies calf pain at this time.    0200: Bloody drainage noted to posterior distal end of dressing. No changes noted to the foot at this time.     0400: Dressing reinforced with ABD pad and Kerlix d/t bloody drainage to the posterior distal end of the dressing.  Coloring t the foot has improved, skin is cool to the touch, pedal pulse absent with doppler.      0500: Complete linen change, Pt tolerated without difficulty. Medicated for pain.     0600:  Pt resting with controlled pain.  Reinforced dressing with very slight shadowing.

## 2025-07-25 NOTE — ANESTHESIA POSTPROCEDURE EVALUATION
Department of Anesthesiology  Postprocedure Note    Patient: Garry Gamez  MRN: [id number]  YOB: 1960  Date of evaluation: 7/24/2025    Procedure Summary       Date: 07/24/25 Room / Location: [address]    Anesthesia Start: 2029 Anesthesia Stop: 2227    Procedure: LEFT LEG FOR COMPARTMENT SYNDROME FASCEOTOMY.ANGIOGRAM LEFT LEG. (Left: Groin) Diagnosis:       Bleeding      (Bleeding [R58])    Surgeons: Naomi Gallardo MD Responsible Provider: Kev Owen MD    Anesthesia Type: General ASA Status: 3 - Emergent            Anesthesia Type: General    Tony Phase I: Tony Score: 10    Tony Phase II:      Anesthesia Post Evaluation    Patient location during evaluation: bedside  Patient participation: complete - patient participated  Level of consciousness: awake and awake and alert  Airway patency: patent  Nausea & Vomiting: no nausea and no vomiting  Cardiovascular status: blood pressure returned to baseline and hemodynamically stable  Respiratory status: acceptable  Hydration status: euvolemic  Pain management: adequate    No notable events documented.

## 2025-07-25 NOTE — PROGRESS NOTES
Spiritual Health History and Assessment/Progress Note  [facility]    (P) Initial Encounter,  ,  ,      Name: Garry Gamez MRN: [id number]    Age: 65 y.o.     Sex: male   Language: English   Christian: None   Femoral artery occlusion, left     Date: 7/25/2025            Total Time Calculated: (P) 15 min              Spiritual Assessment began in [facility] ICU        Referral/Consult From: (P) Rounding   Encounter Overview/Reason: (P) Initial Encounter  Service Provided For: (P) Patient    Susan, Belief, Meaning:   Patient has beliefs or practices that help with coping during difficult times  Family/Friends No family/friends present      Importance and Influence:  Patient has spiritual/personal beliefs that influence decisions regarding their health  Family/Friends No family/friends present    Community:  Patient feels well-supported. Support system includes: Children  Family/Friends No family/friends present    Assessment and Plan of Care:     Pt, resting, receptive, and responsive. Pt, reports coping, continued progress and improvement of health, wellness, and strength. Pt, requested prayer for his sons to find his purpose and meaning in life. Pt, reports active connection of personal susan and spiritual practices that were in care encounter.     Pt, calm, peaceful, and resting.     Patient Interventions include: Facilitated expression of thoughts and feelings, Explored spiritual coping/struggle/distress, and Affirmed coping skills/support systems  Family/Friends Interventions include: No family/friends present    Patient Plan of Care: Spiritual Care available upon further referral  Family/Friends Plan of Care: No family/friends present    Electronically signed by Chaplain Sofia on 7/25/2025 at 2:39 PM

## 2025-07-25 NOTE — CONSULTS
Pulmonary and Critical Care Medicine  Consult Note  Encounter Date: 2025 8:56 AM    Mr. Garry Gamez is a 65 y.o. male  : 1960  Requesting Provider: Deidre Callejas MD    Reason for request: ICU management            HISTORY OF PRESENT ILLNESS:    Patient is 65 y.o. presents with status postLeft lower extremity embolectomy of the left tibioperoneal trunk, left femoral to below-knee popliteal bypass, and stent in the left femoral to below-knee bypass.  Subsequently also he underwent 4 compartment fasciotomy due to acute left lower extremity compartment syndrome.  He is doing good, pain is controlled, no hematoma in the groin, pulses palpable,  No fever he is on room air saturating 99%.  No chest pain or difficulty        Past Medical History:        Diagnosis Date    Alcoholism (HCC)     Anxiety     Atherosclerosis of native artery of left lower extremity with intermittent claudication     Depression     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)     GERD (gastroesophageal reflux disease)     Hx of blood clots     Hyperlipidemia     Hypertension     PVD (peripheral vascular disease)     Spinal stenosis at L4-L5 level        Past Surgical History:        Procedure Laterality Date    ABDOMINAL AORTIC ANEURYSM REPAIR, ENDOVASCULAR Right 8/15/2024    COVERED TUBE STENT FOR COMMON AND EXTERNAL ILIAC ARTERIES RIGHT. performed by Naomi Gallardo MD at [facility] OR    FEMORAL BYPASS Left 2024    LEFT FEMORAL TO POPLITEAL BYPASS WITH VEIN VS. SYNTHETIC BYPASS GRAPH  T&S ON ARRIVAL AND ALL OTHER LABS & H&P IN SYSTEM  C-ARM performed by Naomi Gallardo MD at [facility] OR    TONSILLECTOMY      VASCULAR SURGERY Right 2024    RIGHT INTERNAL ILIAC ARTERY COILING, RIGHT GROIN APPROACH. performed by Naomi Gallardo MD at [facility] OR    VASCULAR SURGERY Left 2025    LEFT LEG FOR COMPARTMENT SYNDROME FASCEOTOMY.ANGIOGRAM LEFT LEG. performed by Naomi Gallardo MD at [facility] OR    VASCULAR  SURGERY Left 7/24/2025    LEFT LOWER EXTREMITY ANGIOGRAM, LEFT LOWER  EXTREMITY EMBOLECTOMY; LYSIS ATHERECTOMY,  ANGIOPLASTY, AND  SFA popliteal and tibial peroneal STENT performed [place] [facility] [place]       Social History:     reports that he has been smoking cigarettes. He has a 20 pack-year smoking history. He has never been exposed to tobacco smoke. He has never used smokeless tobacco. He reports that he does not currently use alcohol. He reports current drug use. Frequency: 7.00 times per week. Drug: Marijuana (Weed).    Family History:       Problem Relation Age of Onset    Heart Disease Father        Allergies:  Patient has no known allergies.        MEDICATIONS during current hospitalization:    Continuous Infusions:   heparin (PORCINE) Infusion 500 Units/hr (07/24/25 2346)    sodium chloride 100 mL/hr at 07/24/25 2029    niCARdipine Stopped (07/24/25 0951)       Scheduled Meds:   sertraline  50 mg Oral Daily    aspirin  81 mg Oral Daily    atorvastatin  20 mg Oral Daily    clopidogrel  75 mg Oral Daily    gabapentin  400 mg Oral TID    pantoprazole  40 mg Oral QAM AC    nicotine  1 patch TransDERmal Daily    sodium chloride  500 mL Intra-arTERial Once    sodium chloride flush  5-40 mL IntraVENous 2 times per day       PRN Meds:HYDROmorphone, sodium chloride flush, sodium chloride, potassium chloride **OR** potassium alternative oral replacement **OR** potassium chloride, magnesium sulfate, ondansetron **OR** ondansetron, polyethylene glycol, acetaminophen **OR** acetaminophen        REVIEW OF SYSTEMS:  ROS: 10 organs review of system is done including general, psychological, ENT, hematological, endocrine, respiratory, cardiovascular, gastrointestinal, musculoskeletal, neurological,  allergy and Immunology is done and is otherwise negative.    PHYSICAL EXAM:    Vitals:  BP (!) 127/103   Pulse 92   Temp 98 °F (36.7 °C) (Oral)   Resp (!) 8   Ht 1.778 m (5' 10\")   Wt 66.2 kg (145 lb 14.4 oz)

## 2025-07-25 NOTE — ACP (ADVANCE CARE PLANNING)
Advance Care Planning     Advance Care Planning Inpatient Note  [facility] Department    Today's Date: 7/25/2025  Unit: [facility] ICU    Received request from HealthCare Provider.  Upon review of chart and communication with care team, patient's decision making abilities are not in question.. Patient was/were present in the room during visit.    Goals of ACP Conversation:  Discuss advance care planning documents  Facilitate a discussion related to patient's goals of care as they align with the patient's values and beliefs.    Health Care Decision Makers:       Primary Decision Maker: Adriana Covarrubias [facility] - [phone]  Summary:  Completed New Documents  Verified Healthcare Decision Maker  Updated Healthcare Decision Maker  Documented Next of Kin, per patient report  No Decision Maker named by patient at this time    The pt was awake, and decisional. The pt's significant other was present in the room. The pt desired to have his ACP/HPOA Forms filled out and assign his HPOA. The pt assigned his daughter as primary, and son as secondary. The pt received his original copy and additional copies were provided for him as well.  services will continue to be available for the pt.     Advance Care Planning Documents (Patient Wishes):  Healthcare Power of /Advance Directive Appointment of Health Care Agent  Living Will/Advance Directive  Legal Guardianship Document     Assessment:    Interventions:  Provided education on documents for clarity and greater understanding  Discussed and provided education on state decision maker hierarchy  Assisted in the completion of documents according to patient's wishes at this time  Encouraged ongoing ACP conversation with future decision makers and loved ones    Care Preferences Communicated:   No    Outcomes/Plan:  New advance directive completed.  Returned original document(s) to patient, as well as copies for distribution to appointed agents  Copy of advance directive

## 2025-07-25 NOTE — PROGRESS NOTES
PHARMACY NOTE:   ICU Rounds Attended (10-15 minutes in patient room):    Pt diagnosis: fem occlusion    IV Fluids: none   Renal:   Recent Labs     07/23/25  1736 07/23/25  1743 07/24/25  0459   CREATININE 0.7* 0.77 0.71    Estimated Creatinine Clearance: 100 mL/min (based on SCr of 0.71 mg/dL).        Antimicrobial Therapy:   none    Recent Labs     07/23/25  1742 07/24/25  0459 07/24/25  1831 07/25/25  0208 07/25/25  0554   WBC 16.6* 15.9* 19.2* 24.5* 24.9*        Pressors:   None    Sedation:   none    Insulin Therapy (goal: 140-180): none    Recent Labs     07/23/25  1743 07/24/25  0459   GLUCOSE 128* 134*       Steroid Therapy: none    Stress Ulcer Prophylaxis:   Pantoprazole     DVT Prophylaxis/Anticoagulant Therapy: heparin fixed rate    Recent Labs     07/24/25  1831 07/25/25  0208 07/25/25  0554    142 139     Recent Labs     07/23/25  1743   INR 1.2       Bowel Regimen:   Miralax prn    Follow up/Changes:   -dc cardene per verbal  -resume home amlodipine and losartan per verbal on rounds     Amna Clark RPH PharmD   7/25/2025 12:06 PM

## 2025-07-25 NOTE — CONSULTS
Spiritual Health History and Assessment/Progress Note  [facility]    Advance Care Planning,  ,  ,      Name: Garry Gamez MRN: [id number]    Age: 65 y.o.     Sex: male   Language: English   Hoahaoism: None   Femoral artery occlusion, left     Date: 7/25/2025            Total Time Calculated: 50 min              Spiritual Assessment began in [facility] ICU        Referral/Consult From: Physician   Encounter Overview/Reason: Advance Care Planning  Service Provided For: Patient and family together      The pt was awake, and decisional. The pt's significant other was present in the room. The pt desired to have his ACP/HPOA Forms filled out and assign his HPOA. The pt assigned his daughter as primary, and son as secondary. The pt received his original copy and additional copies were provided for him as well.  services will continue to be available for the pt.     Susan, Belief, Meaning:   Patient identifies as spiritual, is connected with a susan tradition or spiritual practice, and has beliefs or practices that help with coping during difficult times  Family/Friends identify as spiritual, are connected with a susan tradition or spiritual practice, and have beliefs or practices that help with coping during difficult times      Importance and Influence:  Patient has spiritual/personal beliefs that influence decisions regarding their health  Family/Friends have spiritual/personal beliefs that influence decisions regarding the patient's health    Community:  Patient is connected with a spiritual community and feels well-supported. Support system includes: Spouse/Partner, Children, Susan Community, Friends, and Extended family  Family/Friends are connected with a spiritual community: and feel well-supported. Support system includes: Spouse/Partner, Children, Susan Community, Friends, and Extended family    Assessment and Plan of Care:     Patient Interventions include: Facilitated expression of thoughts

## 2025-07-25 NOTE — INTERDISCIPLINARY ROUNDS
Spiritual Care Services     Summary of Visit    Attended Morning ICU Rounds. Pt resting, family present @ bedside during rounds. Pt has no connection to jean tradition at time of rounds.     Pt, alert, receptive, responsive, and present during morning rounds.       Encounter Summary  Encounter Overview/Reason: Interdisciplinary rounds  Service Provided For: Patient                               Spiritual Assessment/Intervention/Outcomes:                     Care Plan:               Spiritual Care Services   Electronically signed by Chaplain Sofia on 7/25/2025 at 11:25 AM.    To reach a  for emotional and spiritual support, place an EPIC consult request.   If a  is needed immediately, dial “0” and ask to page the on-call .

## 2025-07-25 NOTE — PROGRESS NOTES
Progress Note  Patient: [facility]ry [facility]  Unit/Bed: IC07/IC07-01  YOB: 1960  MRN: [id number]  Acct: [id number]   Admitting Diagnosis: Femoral artery occlusion, left [I70.202]  Acute occlusion of artery of lower extremity [I70.209]  Date:  7/23/2025  Hospital Day: 2      Subjective  Patient denies pain in the left lower extremity.    Review of Systems:   Review of Systems  Afebrile    Physical Examination:    /88   Pulse 95   Temp 98.1 °F (36.7 °C) (Oral)   Resp 18   Ht 1.778 m (5' 10\")   Wt 66.2 kg (145 lb 14.4 oz)   SpO2 99%   BMI 20.93 kg/m²    Physical Exam  Alert and orient x 3  Cardiovascular regular rate and rhythm rhythm  Lungs lungs clear to auscultation bilaterally  Left leg compartments are soft.  Left PT triphasic left peroneal triphasic  Left foot is warm and appears well-perfused skin however remains mottled    LABS:  CBC:   Lab Results   Component Value Date/Time    WBC 21.3 07/25/2025 11:53 AM    RBC 3.41 07/25/2025 11:53 AM    HGB 10.6 07/25/2025 11:53 AM    HCT 30.8 07/25/2025 11:53 AM    MCV 90.3 07/25/2025 11:53 AM    MCH 31.1 07/25/2025 11:53 AM    MCHC 34.4 07/25/2025 11:53 AM    RDW 15.2 07/25/2025 11:53 AM     07/25/2025 11:53 AM     CBC with Differential:   Lab Results   Component Value Date/Time    WBC 21.3 07/25/2025 11:53 AM    RBC 3.41 07/25/2025 11:53 AM    HGB 10.6 07/25/2025 11:53 AM    HCT 30.8 07/25/2025 11:53 AM     07/25/2025 11:53 AM    MCV 90.3 07/25/2025 11:53 AM    MCH 31.1 07/25/2025 11:53 AM    MCHC 34.4 07/25/2025 11:53 AM    RDW 15.2 07/25/2025 11:53 AM    LYMPHOPCT 11.0 07/24/2025 04:59 AM    MONOPCT 7.6 07/24/2025 04:59 AM    EOSPCT 1.1 07/24/2025 04:59 AM    BASOPCT 0.3 07/24/2025 04:59 AM    MONOSABS 1.3 07/24/2025 04:59 AM    LYMPHSABS 1.7 07/24/2025 04:59 AM    EOSABS 0.0 07/24/2025 04:59 AM    BASOSABS 0.0 07/24/2025 04:59 AM     CMP:    Lab Results   Component Value Date/Time     07/25/2025 11:53 AM    K 4.3 07/25/2025

## 2025-07-25 NOTE — PROGRESS NOTES
Shift summary:    0700 Bedside report received from Lynn BARILLAS.     0800 Shift assessment completed, see flowsheets. Pt's LLE is warm but still appears red/ purple. Post-tibial pulse was located with a doppler. Pt medicated for pain to LLE and back. New dressing applied to LLE.     0915 ICU rounds completed, see new orders       1645 Dr. Gallardo at bedside. Heparin gtt increased to 750 units/hr per surgeon request. Harvey catheter removed. Pt medicated with PRN Dilaudid.     Electronically signed by Jana Morales RN on 7/25/2025 at 6:42 PM

## 2025-07-25 NOTE — OP NOTE
Operative Note      Patient: Garry Gamez  YOB: 1960  MRN: [id number]    Date of Procedure: 7/24/2025    Pre-op diagnosis:  1.  Left lower extremity compartment syndrome  2.  Acute left lower extremity ischemia status post lysis    Procedure:  1.   4 compartment fasciotomy  2.  Lysis check and completion of lysis therapy      Surgeon(s):  Naomi Gallardo MD    Assistant:   Physician Assistant: Anaya Blackburn PA-C    Anesthesia: General    Estimated Blood Loss (mL): less than 50     Complications: None    Specimens:   * No specimens in log *    Implants:  * No implants in log *      Drains:   Urinary Catheter 07/24/25 Harvey (Active)   Catheter Indications Perioperative use for selected surgical procedures 07/24/25 1700   Site Assessment Pink;No urethral drainage 07/24/25 1700   Urine Color Yellow 07/24/25 1700   Urine Appearance Clear 07/24/25 1700   Collection Container Standard 07/24/25 1700   Securement Method Securing device (Describe) 07/24/25 1700   Catheter Care  Soap and water 07/24/25 1700   Catheter Best Practices  Drainage tube clipped to bed;Catheter secured to thigh;Bag below bladder;Tamper seal intact;Lack of dependent loop in tubing;Bag not on floor;Drainage bag less than half full 07/24/25 1700   Status Draining 07/24/25 1700       Findings:  Present At Time Of Surgery (PATOS) (choose all levels that have infection present):  No infection present    Indications for procedure:  Patient is a 65-year-old male with acute left lower extremity ischemia who is status post lytic therapy and stenting.  Patient developed compartment syndrome while on lytic therapy and needs 4 compartment fasciotomies with lytic check.  Risk and benefits of the procedure were discussed with the patient and family and the family has agreed to consent to the procedure as the patient was previously sedated this morning.  Risks including but not limited to bleeding, infection, pain, limb loss, MI, death were

## 2025-07-25 NOTE — CARE COORDINATION
Team ICU rounds done this am at bedside and pt has Avasys in room. Pt continues on Hep gtt and D/C plan is home w/ S.O.

## 2025-07-25 NOTE — ANESTHESIA PRE PROCEDURE
stenosis at L4-L5 level        Past Surgical History:        Procedure Laterality Date   • ABDOMINAL AORTIC ANEURYSM REPAIR, ENDOVASCULAR Right 8/15/2024    COVERED TUBE STENT FOR COMMON AND EXTERNAL ILIAC ARTERIES RIGHT. performed by Naomi Gallardo MD at [facility] OR   • FEMORAL BYPASS Left 01/04/2024    LEFT FEMORAL TO POPLITEAL BYPASS WITH VEIN VS. SYNTHETIC BYPASS GRAPH  T&S ON ARRIVAL AND ALL OTHER LABS & H&P IN SYSTEM  C-ARM performed by Naomi Gallardo MD at [facility] OR   • TONSILLECTOMY     • VASCULAR SURGERY Right 07/05/2024    RIGHT INTERNAL ILIAC ARTERY COILING, RIGHT GROIN APPROACH. performed by Naomi Gallardo MD at [facility] OR       Social History:    Social History     Tobacco Use   • Smoking status: Every Day     Current packs/day: 1.00     Average packs/day: 1 pack/day for 20.0 years (20.0 ttl pk-yrs)     Types: Cigarettes     Passive exposure: Never   • Smokeless tobacco: Never   Substance Use Topics   • Alcohol use: Not Currently     Comment: pt has not had a drink in 1 yr and 2 months/history of alcoholism                                Ready to quit: Not Answered  Counseling given: Not Answered      Vital Signs (Current):   Vitals:    07/24/25 1930 07/24/25 1935 07/24/25 1940 07/24/25 1945   BP: (!) 138/99  108/69 118/72   Pulse: 76 82 83 75   Resp: 10 15 17 13   Temp:       TempSrc:       SpO2: 97% 99% 97% 92%   Weight:       Height:                                                  BP Readings from Last 3 Encounters:   07/24/25 118/72   07/23/25 (!) 144/100   05/08/25 118/76       NPO Status: Time of last liquid consumption: 2300                        Time of last solid consumption: 2300                        Date of last liquid consumption: 07/23/25                        Date of last solid food consumption: 07/23/25    BMI:   Wt Readings from Last 3 Encounters:   07/23/25 66.2 kg (145 lb 14.4 oz)   07/23/25 68 kg (150 lb)   05/08/25 67.3 kg (148 lb 6.4 oz)     Body mass index is 20.93

## 2025-07-26 LAB
ANTI-XA UNFRAC HEPARIN: 0.12 IU/ML
ANTI-XA UNFRAC HEPARIN: 0.14 IU/ML
ANTI-XA UNFRAC HEPARIN: 0.16 IU/ML
ERYTHROCYTE [DISTWIDTH] IN BLOOD BY AUTOMATED COUNT: 14.6 % (ref 11.5–14.5)
ERYTHROCYTE [DISTWIDTH] IN BLOOD BY AUTOMATED COUNT: 14.7 % (ref 11.5–14.5)
HCT VFR BLD AUTO: 26.8 % (ref 42–52)
HCT VFR BLD AUTO: 27 % (ref 42–52)
HGB BLD-MCNC: 9.5 G/DL (ref 14–18)
HGB BLD-MCNC: 9.6 G/DL (ref 14–18)
MCH RBC QN AUTO: 31.5 PG (ref 27–31.3)
MCH RBC QN AUTO: 31.8 PG (ref 27–31.3)
MCHC RBC AUTO-ENTMCNC: 35.4 % (ref 33–37)
MCHC RBC AUTO-ENTMCNC: 35.6 % (ref 33–37)
MCV RBC AUTO: 88.5 FL (ref 79–92.2)
MCV RBC AUTO: 89.6 FL (ref 79–92.2)
PLATELET # BLD AUTO: 118 K/UL (ref 130–400)
PLATELET # BLD AUTO: 123 K/UL (ref 130–400)
RBC # BLD AUTO: 2.99 M/UL (ref 4.7–6.1)
RBC # BLD AUTO: 3.05 M/UL (ref 4.7–6.1)
WBC # BLD AUTO: 15.5 K/UL (ref 4.8–10.8)
WBC # BLD AUTO: 15.6 K/UL (ref 4.8–10.8)

## 2025-07-26 PROCEDURE — 2000000000 HC ICU R&B

## 2025-07-26 PROCEDURE — 6360000002 HC RX W HCPCS: Performed by: SURGERY

## 2025-07-26 PROCEDURE — 36415 COLL VENOUS BLD VENIPUNCTURE: CPT

## 2025-07-26 PROCEDURE — 2500000003 HC RX 250 WO HCPCS: Performed by: SURGERY

## 2025-07-26 PROCEDURE — 6370000000 HC RX 637 (ALT 250 FOR IP): Performed by: SURGERY

## 2025-07-26 PROCEDURE — 6370000000 HC RX 637 (ALT 250 FOR IP): Performed by: STUDENT IN AN ORGANIZED HEALTH CARE EDUCATION/TRAINING PROGRAM

## 2025-07-26 PROCEDURE — 6360000002 HC RX W HCPCS: Performed by: STUDENT IN AN ORGANIZED HEALTH CARE EDUCATION/TRAINING PROGRAM

## 2025-07-26 PROCEDURE — 97162 PT EVAL MOD COMPLEX 30 MIN: CPT

## 2025-07-26 PROCEDURE — 85027 COMPLETE CBC AUTOMATED: CPT

## 2025-07-26 PROCEDURE — 85520 HEPARIN ASSAY: CPT

## 2025-07-26 PROCEDURE — 6370000000 HC RX 637 (ALT 250 FOR IP): Performed by: INTERNAL MEDICINE

## 2025-07-26 PROCEDURE — 93005 ELECTROCARDIOGRAM TRACING: CPT | Performed by: STUDENT IN AN ORGANIZED HEALTH CARE EDUCATION/TRAINING PROGRAM

## 2025-07-26 PROCEDURE — 99232 SBSQ HOSP IP/OBS MODERATE 35: CPT | Performed by: INTERNAL MEDICINE

## 2025-07-26 RX ORDER — HEPARIN SODIUM 10000 [USP'U]/100ML
5-30 INJECTION, SOLUTION INTRAVENOUS CONTINUOUS
Status: DISCONTINUED | OUTPATIENT
Start: 2025-07-26 | End: 2025-07-30

## 2025-07-26 RX ORDER — METOPROLOL TARTRATE 25 MG/1
25 TABLET, FILM COATED ORAL 2 TIMES DAILY
Status: DISCONTINUED | OUTPATIENT
Start: 2025-07-26 | End: 2025-07-27

## 2025-07-26 RX ORDER — METOPROLOL TARTRATE 1 MG/ML
5 INJECTION, SOLUTION INTRAVENOUS ONCE
Status: COMPLETED | OUTPATIENT
Start: 2025-07-26 | End: 2025-07-26

## 2025-07-26 RX ADMIN — LOSARTAN POTASSIUM 25 MG: 25 TABLET, FILM COATED ORAL at 08:54

## 2025-07-26 RX ADMIN — PANTOPRAZOLE SODIUM 40 MG: 40 TABLET, DELAYED RELEASE ORAL at 05:59

## 2025-07-26 RX ADMIN — Medication 10 ML: at 20:49

## 2025-07-26 RX ADMIN — GABAPENTIN 400 MG: 400 CAPSULE ORAL at 20:49

## 2025-07-26 RX ADMIN — HEPARIN SODIUM 1000 UNITS/HR: 10000 INJECTION, SOLUTION INTRAVENOUS at 14:36

## 2025-07-26 RX ADMIN — METOPROLOL TARTRATE 25 MG: 25 TABLET, FILM COATED ORAL at 16:30

## 2025-07-26 RX ADMIN — AMLODIPINE BESYLATE 5 MG: 5 TABLET ORAL at 08:53

## 2025-07-26 RX ADMIN — METOPROLOL TARTRATE 5 MG: 1 INJECTION, SOLUTION INTRAVENOUS at 15:21

## 2025-07-26 RX ADMIN — CLOPIDOGREL BISULFATE 75 MG: 75 TABLET, FILM COATED ORAL at 08:53

## 2025-07-26 RX ADMIN — SERTRALINE 50 MG: 50 TABLET, FILM COATED ORAL at 08:53

## 2025-07-26 RX ADMIN — GABAPENTIN 400 MG: 400 CAPSULE ORAL at 14:35

## 2025-07-26 RX ADMIN — ATORVASTATIN CALCIUM 20 MG: 20 TABLET, FILM COATED ORAL at 08:53

## 2025-07-26 RX ADMIN — ACETAMINOPHEN 650 MG: 325 TABLET ORAL at 12:45

## 2025-07-26 RX ADMIN — HYDROMORPHONE HYDROCHLORIDE 1 MG: 1 INJECTION, SOLUTION INTRAMUSCULAR; INTRAVENOUS; SUBCUTANEOUS at 14:40

## 2025-07-26 RX ADMIN — HYDROMORPHONE HYDROCHLORIDE 1 MG: 1 INJECTION, SOLUTION INTRAMUSCULAR; INTRAVENOUS; SUBCUTANEOUS at 20:49

## 2025-07-26 RX ADMIN — ASPIRIN 81 MG: 81 TABLET, DELAYED RELEASE ORAL at 08:53

## 2025-07-26 RX ADMIN — Medication 10 ML: at 08:54

## 2025-07-26 RX ADMIN — HYDROMORPHONE HYDROCHLORIDE 1 MG: 1 INJECTION, SOLUTION INTRAMUSCULAR; INTRAVENOUS; SUBCUTANEOUS at 10:31

## 2025-07-26 RX ADMIN — GABAPENTIN 400 MG: 400 CAPSULE ORAL at 08:53

## 2025-07-26 RX ADMIN — HYDROMORPHONE HYDROCHLORIDE 1 MG: 1 INJECTION, SOLUTION INTRAMUSCULAR; INTRAVENOUS; SUBCUTANEOUS at 06:01

## 2025-07-26 ASSESSMENT — PAIN SCALES - GENERAL
PAINLEVEL_OUTOF10: 3
PAINLEVEL_OUTOF10: 2
PAINLEVEL_OUTOF10: 5
PAINLEVEL_OUTOF10: 0
PAINLEVEL_OUTOF10: 8
PAINLEVEL_OUTOF10: 8
PAINLEVEL_OUTOF10: 0
PAINLEVEL_OUTOF10: 7

## 2025-07-26 ASSESSMENT — PAIN DESCRIPTION - ORIENTATION
ORIENTATION: RIGHT
ORIENTATION: LEFT

## 2025-07-26 ASSESSMENT — PAIN SCALES - WONG BAKER: WONGBAKER_NUMERICALRESPONSE: HURTS A LITTLE BIT

## 2025-07-26 ASSESSMENT — PAIN DESCRIPTION - LOCATION
LOCATION: LEG
LOCATION: LEG;FOOT

## 2025-07-26 ASSESSMENT — PAIN DESCRIPTION - DESCRIPTORS: DESCRIPTORS: BURNING

## 2025-07-26 NOTE — PROGRESS NOTES
Pulmonary & Critical Care Medicine ICU Progress Note  Chief complaint : Ischemic foot    Subjunctive/24 hour events :   Patient seen and examined during multidisciplinary rounds with RN, charge nurse, RT, pharmacy, dietitian, and social service.   Doing good, pulse palpable both feet, no complaint, no pain, no abdominal pain or difficulty breathing, no issues overnight.        New information updated in the note today, rest of the examination did not change compared to yesterday.  Social History     Tobacco Use    Smoking status: Every Day     Current packs/day: 1.00     Average packs/day: 1 pack/day for 20.0 years (20.0 ttl pk-yrs)     Types: Cigarettes     Passive exposure: Never    Smokeless tobacco: Never   Substance Use Topics    Alcohol use: Not Currently     Comment: pt has not had a drink in 1 yr and 2 months/history of alcoholism         Problem Relation Age of Onset    Heart Disease Father        No results for input(s): \"PHART\", \"QQP9NCY\", \"PO2ART\" in the last 72 hours.    MV Settings:     / / /            IV:   heparin (PORCINE) Infusion 750 Units/hr (07/25/25 1731)    sodium chloride Stopped (07/24/25 2032)       Vitals:  /88   Pulse 93   Temp 97.8 °F (36.6 °C) (Axillary)   Resp 16   Ht 1.778 m (5' 10\")   Wt 66.2 kg (145 lb 14.4 oz)   SpO2 94%   BMI 20.93 kg/m²    Tmax:        Intake/Output Summary (Last 24 hours) at 7/26/2025 0830  Last data filed at 7/26/2025 0634  Gross per 24 hour   Intake 603.31 ml   Output 1150 ml   Net -546.69 ml       EXAM:  General: alert, cooperative, no distress  Head: normocephalic, atraumatic  Eyes:No gross abnormalities.  ENT:  MMM no lesions  Neck:  supple and no masses  Chest : clear to auscultation bilaterally- no wheezes, rales or rhonchi, normal air movement, no respiratory distress  Heart:: Heart sounds are normal.  Regular rate and rhythm without murmur, gallop or rub.  ABD:  symmetric, soft, non-tender  Musculoskeletal : No edema, skin of left foot is

## 2025-07-26 NOTE — PROGRESS NOTES
Progress Note  Patient: [facility]  Unit/Bed: IC07/IC07-01  YOB: 1960  MRN: [id number]  Acct: [id number]   Admitting Diagnosis: Femoral artery occlusion, left [I70.202]  Acute occlusion of artery of lower extremity [I70.209]  Date:  7/23/2025  Hospital Day: 3      Subjective  Patient denies pain in the left foot.      Review of Systems:   Review of Systems  Afebrile    Physical Examination:    BP (!) 139/97   Pulse 93   Temp 97.8 °F (36.6 °C) (Axillary)   Resp 16   Ht 1.778 m (5' 10\")   Wt 66.2 kg (145 lb 14.4 oz)   SpO2 94%   BMI 20.93 kg/m²    Physical Exam  Alert and orient x 3  Cardiovascular regular rate and rhythm rhythm  Lungs lungs clear to auscultation bilaterally  Right groin soft no hematoma noted.  Chronic ecchymosis noted of the skin.  Left leg compartments are soft.  Dressing changed today with healthy muscle noted.  Left PT 2+ left peroneal triphasic left anterior tibial multiphasic  Left foot is warm and appears well-perfused skin however remains mottled    LABS:  CBC:   Lab Results   Component Value Date/Time    WBC 15.5 07/26/2025 05:39 AM    RBC 3.05 07/26/2025 05:39 AM    HGB 9.6 07/26/2025 05:39 AM    HCT 27.0 07/26/2025 05:39 AM    MCV 88.5 07/26/2025 05:39 AM    MCH 31.5 07/26/2025 05:39 AM    MCHC 35.6 07/26/2025 05:39 AM    RDW 14.7 07/26/2025 05:39 AM     07/26/2025 05:39 AM     CBC with Differential:   Lab Results   Component Value Date/Time    WBC 15.5 07/26/2025 05:39 AM    RBC 3.05 07/26/2025 05:39 AM    HGB 9.6 07/26/2025 05:39 AM    HCT 27.0 07/26/2025 05:39 AM     07/26/2025 05:39 AM    MCV 88.5 07/26/2025 05:39 AM    MCH 31.5 07/26/2025 05:39 AM    MCHC 35.6 07/26/2025 05:39 AM    RDW 14.7 07/26/2025 05:39 AM    LYMPHOPCT 11.0 07/24/2025 04:59 AM    MONOPCT 7.6 07/24/2025 04:59 AM    EOSPCT 1.1 07/24/2025 04:59 AM    BASOPCT 0.3 07/24/2025 04:59 AM    MONOSABS 1.3 07/24/2025 04:59 AM    LYMPHSABS 1.7 07/24/2025 04:59 AM    EOSABS 0.0

## 2025-07-26 NOTE — PROGRESS NOTES
Pt awake alert and oriented x 3. Dressing at left lower legs intact with noted red drainage. Pulses easily doppled at post tibial and dorsal pedal areas. Pt tolerating diet well.

## 2025-07-26 NOTE — PROGRESS NOTES
[id number]: Bedside report obtained from Jana BARILLAS, after which this RN assumed care of pt. Skin assessment completed with offgoing RN.    2000-2200: Shift assessments completed and documented, see flowsheets. A&OX4. Pt on continuous heparin, no titration ordered. Medications administered per MAR including PRN dilaudid for pain 7/10 per pt. R groin site with slight ecchymosis, soft, no bleeding. Left leg dressing dry, intact, with minimal old dry drainage. Bilateral pedal pulses able to be doppled. Call light within reach. No further needs verbalized at this time by pt. Bed alarm on. 0323: Dr Naomi Gallardo MD notified via [employer] of pt worsening ecchymosis of R groin site on re-assessment-- site still soft, no bleeding, and bilateral pulses still doppable. Dr Gallardo informed of Xa results. No new orders.

## 2025-07-26 NOTE — PLAN OF CARE
Problem: Discharge Planning  Goal: Discharge to home or other facility with appropriate resources  Outcome: Progressing  Flowsheets (Taken 7/25/2025 2000)  Discharge to home or other facility with appropriate resources: Identify barriers to discharge with patient and caregiver     Problem: Pain  Goal: Verbalizes/displays adequate comfort level or baseline comfort level  Outcome: Progressing     Problem: Safety - Adult  Goal: Free from fall injury  Outcome: Progressing     Problem: Skin/Tissue Integrity  Goal: Skin integrity remains intact  Description: 1.  Monitor for areas of redness and/or skin breakdown  2.  Assess vascular access sites hourly  3.  Every 4-6 hours minimum:  Change oxygen saturation probe site  4.  Every 4-6 hours:  If on nasal continuous positive airway pressure, respiratory therapy assess nares and determine need for appliance change or resting period  Outcome: Progressing  Flowsheets (Taken 7/25/2025 2000)  Skin Integrity Remains Intact:   Monitor for areas of redness and/or skin breakdown   Assess vascular access sites hourly     Problem: Chronic Conditions and Co-morbidities  Goal: Patient's chronic conditions and co-morbidity symptoms are monitored and maintained or improved  Outcome: Progressing  Flowsheets (Taken 7/25/2025 2000)  Care Plan - Patient's Chronic Conditions and Co-Morbidity Symptoms are Monitored and Maintained or Improved: Collaborate with multidisciplinary team to address chronic and comorbid conditions and prevent exacerbation or deterioration

## 2025-07-26 NOTE — PROGRESS NOTES
Pt returned to bed due to heart rate 155 afib. Heart continues 160 to 130. Dr Callejas notified. EKG completed. Afib with rvr. Medicated with lopressor 5 mg as ordered.

## 2025-07-26 NOTE — PROGRESS NOTES
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]  Acute occlusion of artery of lower extremity [I70.209]   Chief Complaint   Patient presents with    Leg Pain     Pt was sent to the ER by his doctors office for no pulse in his LLE,      Patient Active Problem List    Diagnosis Date Noted    Acute occlusion of artery of lower extremity 2025    History of arterial occlusion 2025    Femoral artery occlusion, left 2025    Tobacco use 2024    Dyspnea 2024    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    Aneurysm of iliac artery, right 2024    Spinal stenosis at L4-L5 level 2024    Erectile dysfunction 2024    Transaminitis 2024    Calf ulcer, left, limited to breakdown of skin (HCC) 2024    Atherosclerosis of native artery of left lower extremity with intermittent claudication 10/04/2023    Anxiety state 2023    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) 2023    GERD (gastroesophageal reflux disease) 2023    Substance induced mood disorder (HCC) 2022        Past Medical History:   Diagnosis Date    Alcoholism (HCC)     Anxiety     Atherosclerosis of native artery of left lower extremity with intermittent claudication     Depression     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)     GERD (gastroesophageal reflux disease)     Hx of blood clots     Hyperlipidemia     Hypertension     PVD (peripheral vascular disease)     Spinal stenosis at L4-L5 level      Past Surgical History:   Procedure Laterality Date    ABDOMINAL AORTIC ANEURYSM REPAIR, ENDOVASCULAR Right 8/15/2024    COVERED TUBE STENT FOR COMMON AND EXTERNAL ILIAC ARTERIES RIGHT. performed by Naomi Gallardo MD  ;Decreased ADL status;Decreased ROM;Decreased strength;Decreased tolerance to work activity;Decreased balance;Decreased endurance;Decreased safe awareness;Decreased coordination;Increased pain  History: High  Exam: Med  Clinical Presentation: med    Therapy Prognosis: Good  Barriers to Learning: none    Treatment Diagnosis: difficulty in walking         DISCHARGE RECOMMENDATIONS:  Discharge Recommendations: Continue to assess pending progress;Therapy recommended at discharge    Assessment: Continued PT indicated to progress mobility and facilitate DC at highest level of indep and safety. Limited by pain with minimal weight bearing L LE. Concerns for pt's home set up with steps at this time. Will follow. May benefit from therapy stay prior to DC home.  Requires PT Follow-Up: Yes       PLAN OF CARE:  Physical Therapy Plan  General Plan: 1 time a day 3-6 times a week  Current Treatment Recommendations: Strengthening;ROM;Balance training;Functional mobility training;Transfer training;Neuromuscular re-education;Stair training;Gait training;Endurance training;Equipment evaluation, education, & procurement;Modalities;Home exercise program;Safety education & training;Patient/Caregiver education & training;ADL/Self-care training    Safety Devices  Type of Devices: All fall risk precautions in place    Goals:  Long Term Goals  Long Term Goal 1: Pt to complete bed mobility with indep  Long Term Goal 2: Pt to complete transfers with indep  Long Term Goal 3: Pt to ambulate 50-150ft with LRD and indep  Long Term Goal 4: Pt to manage 8 steps with HR and indep    AMPA (6 CLICK) BASIC MOBILITY  AM-PAC Inpatient Mobility Raw Score : 17     Therapy Time:   Individual   Time In 1430   Time Out 1445   Minutes 15           Mary Castro, PT, 07/26/25 at 3:19 PM         Definitions for assistance levels  Independent = pt does not require any physical supervision or assistance from another person for activity completion. Device may be

## 2025-07-27 LAB
ALBUMIN SERPL-MCNC: 3.6 G/DL (ref 3.5–4.6)
ALP SERPL-CCNC: 104 U/L (ref 35–104)
ALT SERPL-CCNC: 110 U/L (ref 0–41)
ANION GAP SERPL CALCULATED.3IONS-SCNC: 10 MEQ/L (ref 9–15)
ANTI-XA UNFRAC HEPARIN: 0.18 IU/ML
ANTI-XA UNFRAC HEPARIN: 0.28 IU/ML
ANTI-XA UNFRAC HEPARIN: 0.33 IU/ML
AST SERPL-CCNC: 330 U/L (ref 0–40)
BILIRUB SERPL-MCNC: 0.7 MG/DL (ref 0.2–0.7)
BUN SERPL-MCNC: 10 MG/DL (ref 8–23)
CALCIUM SERPL-MCNC: 8.4 MG/DL (ref 8.5–9.9)
CHLORIDE SERPL-SCNC: 97 MEQ/L (ref 95–107)
CO2 SERPL-SCNC: 24 MEQ/L (ref 20–31)
CREAT SERPL-MCNC: 0.53 MG/DL (ref 0.7–1.2)
ERYTHROCYTE [DISTWIDTH] IN BLOOD BY AUTOMATED COUNT: 14.1 % (ref 11.5–14.5)
ERYTHROCYTE [DISTWIDTH] IN BLOOD BY AUTOMATED COUNT: 14.4 % (ref 11.5–14.5)
GLOBULIN SER CALC-MCNC: 2.6 G/DL (ref 2.3–3.5)
GLUCOSE SERPL-MCNC: 118 MG/DL (ref 70–99)
HCT VFR BLD AUTO: 25.6 % (ref 42–52)
HCT VFR BLD AUTO: 26.8 % (ref 42–52)
HGB BLD-MCNC: 9 G/DL (ref 14–18)
HGB BLD-MCNC: 9.7 G/DL (ref 14–18)
MCH RBC QN AUTO: 30.9 PG (ref 27–31.3)
MCH RBC QN AUTO: 32.2 PG (ref 27–31.3)
MCHC RBC AUTO-ENTMCNC: 35.2 % (ref 33–37)
MCHC RBC AUTO-ENTMCNC: 36.2 % (ref 33–37)
MCV RBC AUTO: 88 FL (ref 79–92.2)
MCV RBC AUTO: 89 FL (ref 79–92.2)
PLATELET # BLD AUTO: 129 K/UL (ref 130–400)
PLATELET # BLD AUTO: 133 K/UL (ref 130–400)
POTASSIUM SERPL-SCNC: 3.9 MEQ/L (ref 3.4–4.9)
PROT SERPL-MCNC: 6.2 G/DL (ref 6.3–8)
RBC # BLD AUTO: 2.91 M/UL (ref 4.7–6.1)
RBC # BLD AUTO: 3.01 M/UL (ref 4.7–6.1)
SODIUM SERPL-SCNC: 131 MEQ/L (ref 135–144)
WBC # BLD AUTO: 17.5 K/UL (ref 4.8–10.8)
WBC # BLD AUTO: 17.7 K/UL (ref 4.8–10.8)

## 2025-07-27 PROCEDURE — 6370000000 HC RX 637 (ALT 250 FOR IP): Performed by: STUDENT IN AN ORGANIZED HEALTH CARE EDUCATION/TRAINING PROGRAM

## 2025-07-27 PROCEDURE — 2500000003 HC RX 250 WO HCPCS: Performed by: SURGERY

## 2025-07-27 PROCEDURE — 99233 SBSQ HOSP IP/OBS HIGH 50: CPT | Performed by: INTERNAL MEDICINE

## 2025-07-27 PROCEDURE — 85027 COMPLETE CBC AUTOMATED: CPT

## 2025-07-27 PROCEDURE — 2000000000 HC ICU R&B

## 2025-07-27 PROCEDURE — 6370000000 HC RX 637 (ALT 250 FOR IP): Performed by: SURGERY

## 2025-07-27 PROCEDURE — 6360000002 HC RX W HCPCS: Performed by: SURGERY

## 2025-07-27 PROCEDURE — 97116 GAIT TRAINING THERAPY: CPT

## 2025-07-27 PROCEDURE — 6370000000 HC RX 637 (ALT 250 FOR IP): Performed by: INTERNAL MEDICINE

## 2025-07-27 PROCEDURE — 80053 COMPREHEN METABOLIC PANEL: CPT

## 2025-07-27 PROCEDURE — 85520 HEPARIN ASSAY: CPT

## 2025-07-27 PROCEDURE — 36415 COLL VENOUS BLD VENIPUNCTURE: CPT

## 2025-07-27 RX ORDER — OXYCODONE HYDROCHLORIDE 5 MG/1
5 TABLET ORAL EVERY 4 HOURS PRN
Refills: 0 | Status: DISCONTINUED | OUTPATIENT
Start: 2025-07-27 | End: 2025-07-31 | Stop reason: HOSPADM

## 2025-07-27 RX ORDER — OXYCODONE HYDROCHLORIDE 5 MG/1
10 TABLET ORAL EVERY 4 HOURS PRN
Refills: 0 | Status: DISCONTINUED | OUTPATIENT
Start: 2025-07-27 | End: 2025-07-31 | Stop reason: HOSPADM

## 2025-07-27 RX ORDER — METOPROLOL TARTRATE 50 MG
50 TABLET ORAL 2 TIMES DAILY
Status: DISCONTINUED | OUTPATIENT
Start: 2025-07-27 | End: 2025-07-31

## 2025-07-27 RX ORDER — DOCUSATE SODIUM 100 MG/1
100 CAPSULE, LIQUID FILLED ORAL 2 TIMES DAILY
Status: DISCONTINUED | OUTPATIENT
Start: 2025-07-27 | End: 2025-07-31 | Stop reason: HOSPADM

## 2025-07-27 RX ADMIN — CLOPIDOGREL BISULFATE 75 MG: 75 TABLET, FILM COATED ORAL at 08:35

## 2025-07-27 RX ADMIN — GABAPENTIN 400 MG: 400 CAPSULE ORAL at 08:39

## 2025-07-27 RX ADMIN — LOSARTAN POTASSIUM 25 MG: 25 TABLET, FILM COATED ORAL at 08:34

## 2025-07-27 RX ADMIN — GABAPENTIN 400 MG: 400 CAPSULE ORAL at 21:51

## 2025-07-27 RX ADMIN — HYDROMORPHONE HYDROCHLORIDE 1 MG: 1 INJECTION, SOLUTION INTRAMUSCULAR; INTRAVENOUS; SUBCUTANEOUS at 06:36

## 2025-07-27 RX ADMIN — SERTRALINE 50 MG: 50 TABLET, FILM COATED ORAL at 08:35

## 2025-07-27 RX ADMIN — PANTOPRAZOLE SODIUM 40 MG: 40 TABLET, DELAYED RELEASE ORAL at 08:35

## 2025-07-27 RX ADMIN — ACETAMINOPHEN 650 MG: 325 TABLET ORAL at 08:35

## 2025-07-27 RX ADMIN — AMLODIPINE BESYLATE 5 MG: 5 TABLET ORAL at 08:34

## 2025-07-27 RX ADMIN — ATORVASTATIN CALCIUM 20 MG: 20 TABLET, FILM COATED ORAL at 08:35

## 2025-07-27 RX ADMIN — METOPROLOL TARTRATE 50 MG: 50 TABLET, FILM COATED ORAL at 08:43

## 2025-07-27 RX ADMIN — DOCUSATE SODIUM 100 MG: 100 CAPSULE, LIQUID FILLED ORAL at 08:43

## 2025-07-27 RX ADMIN — DOCUSATE SODIUM 100 MG: 100 CAPSULE, LIQUID FILLED ORAL at 21:51

## 2025-07-27 RX ADMIN — ASPIRIN 81 MG: 81 TABLET, DELAYED RELEASE ORAL at 08:35

## 2025-07-27 RX ADMIN — GABAPENTIN 400 MG: 400 CAPSULE ORAL at 13:38

## 2025-07-27 RX ADMIN — HEPARIN SODIUM 20.6 UNITS/KG/HR: 10000 INJECTION, SOLUTION INTRAVENOUS at 13:41

## 2025-07-27 RX ADMIN — HYDROMORPHONE HYDROCHLORIDE 1 MG: 1 INJECTION, SOLUTION INTRAMUSCULAR; INTRAVENOUS; SUBCUTANEOUS at 13:37

## 2025-07-27 RX ADMIN — Medication 10 ML: at 21:52

## 2025-07-27 RX ADMIN — HYDROMORPHONE HYDROCHLORIDE 1 MG: 1 INJECTION, SOLUTION INTRAMUSCULAR; INTRAVENOUS; SUBCUTANEOUS at 00:34

## 2025-07-27 RX ADMIN — Medication 10 ML: at 08:45

## 2025-07-27 RX ADMIN — OXYCODONE 10 MG: 5 TABLET ORAL at 19:19

## 2025-07-27 ASSESSMENT — PAIN DESCRIPTION - LOCATION
LOCATION: FOOT;LEG
LOCATION: FOOT;LEG
LOCATION: LEG
LOCATION: LEG
LOCATION: FOOT

## 2025-07-27 ASSESSMENT — PAIN SCALES - GENERAL
PAINLEVEL_OUTOF10: 0
PAINLEVEL_OUTOF10: 0
PAINLEVEL_OUTOF10: 8
PAINLEVEL_OUTOF10: 8
PAINLEVEL_OUTOF10: 7
PAINLEVEL_OUTOF10: 5
PAINLEVEL_OUTOF10: 4
PAINLEVEL_OUTOF10: 0

## 2025-07-27 ASSESSMENT — PAIN DESCRIPTION - DESCRIPTORS
DESCRIPTORS: ACHING;DISCOMFORT;SORE
DESCRIPTORS: BURNING
DESCRIPTORS: BURNING

## 2025-07-27 ASSESSMENT — PAIN DESCRIPTION - ORIENTATION
ORIENTATION: LEFT
ORIENTATION: RIGHT
ORIENTATION: LEFT
ORIENTATION: RIGHT
ORIENTATION: LEFT

## 2025-07-27 ASSESSMENT — PAIN SCALES - WONG BAKER: WONGBAKER_NUMERICALRESPONSE: HURTS A LITTLE BIT

## 2025-07-27 ASSESSMENT — PAIN - FUNCTIONAL ASSESSMENT: PAIN_FUNCTIONAL_ASSESSMENT: ACTIVITIES ARE NOT PREVENTED

## 2025-07-27 NOTE — PROGRESS NOTES
Hospitalist Progress Note      PCP: Lizandro Ruffin MD    Date of Admission: 7/23/2025    Chief Complaint:    Chief Complaint   Patient presents with    Leg Pain     Pt was sent to the ER by his doctors office for no pulse in his LLE,      Subjective:  No acute events overnight. Pt resting comfortably in bed. Reports his LLE pain is well controlled. Denies chest pain or shortness of breath.    Medications:  Reviewed    Infusion Medications    heparin (PORCINE) Infusion 20.6 Units/kg/hr (07/27/25 1341)    sodium chloride Stopped (07/24/25 2032)     Scheduled Medications    metoprolol tartrate  50 mg Oral BID    docusate sodium  100 mg Oral BID    losartan  25 mg Oral Daily    amLODIPine  5 mg Oral Daily    sertraline  50 mg Oral Daily    aspirin  81 mg Oral Daily    atorvastatin  20 mg Oral Daily    clopidogrel  75 mg Oral Daily    gabapentin  400 mg Oral TID    pantoprazole  40 mg Oral QAM AC    nicotine  1 patch TransDERmal Daily    sodium chloride  500 mL Intra-arTERial Once    sodium chloride flush  5-40 mL IntraVENous 2 times per day     PRN Meds: HYDROmorphone, sodium chloride flush, sodium chloride, potassium chloride **OR** potassium alternative oral replacement **OR** potassium chloride, magnesium sulfate, ondansetron **OR** ondansetron, polyethylene glycol, acetaminophen **OR** acetaminophen      Intake/Output Summary (Last 24 hours) at 7/27/2025 1515  Last data filed at 7/27/2025 0600  Gross per 24 hour   Intake 921.46 ml   Output 2150 ml   Net -1228.54 ml     Exam:  /88   Pulse 93   Temp 97.8 °F (36.6 °C) (Axillary)   Resp 16   Ht 1.778 m (5' 10\")   Wt 66.2 kg (145 lb 14.4 oz)   SpO2 94%   BMI 20.93 kg/m²    Physical Exam  Cardiovascular:      Rate and Rhythm: Normal rate and regular rhythm.   Pulmonary:      Effort: Pulmonary effort is normal. No respiratory distress.   Abdominal:      Palpations: Abdomen is soft.      Tenderness: There is no abdominal tenderness.   Musculoskeletal:

## 2025-07-27 NOTE — PROGRESS NOTES
Pulmonary & Critical Care Medicine ICU Progress Note  Chief complaint : Ischemic foot    Subjunctive/24 hour events :   Patient seen and examined during multidisciplinary rounds with RN, charge nurse, RT, pharmacy, dietitian, and social service.   No issues, no chest pain, no coughing, no bowel movement, no lower extremity edema, good appetite, on room air saturation 96%.        New information updated in the note today, rest of the examination did not change compared to yesterday.  Social History     Tobacco Use    Smoking status: Every Day     Current packs/day: 1.00     Average packs/day: 1 pack/day for 20.0 years (20.0 ttl pk-yrs)     Types: Cigarettes     Passive exposure: Never    Smokeless tobacco: Never   Substance Use Topics    Alcohol use: Not Currently     Comment: pt has not had a drink in 1 yr and 2 months/history of alcoholism         Problem Relation Age of Onset    Heart Disease Father        No results for input(s): \"PHART\", \"GPQ5IMR\", \"PO2ART\" in the last 72 hours.    MV Settings:     / / /            IV:   heparin (PORCINE) Infusion 18.6 Units/kg/hr (07/27/25 0638)    sodium chloride Stopped (07/24/25 2032)       Vitals:  /60   Pulse 82   Temp 97.9 °F (36.6 °C) (Axillary)   Resp 13   Ht 1.778 m (5' 10\")   Wt 66.2 kg (145 lb 14.4 oz)   SpO2 96%   BMI 20.93 kg/m²    Tmax:        Intake/Output Summary (Last 24 hours) at 7/27/2025 0830  Last data filed at 7/27/2025 0600  Gross per 24 hour   Intake 1221.46 ml   Output 3150 ml   Net -1928.54 ml       EXAM:  General: alert, cooperative, no distress  Head: normocephalic, atraumatic  Eyes:No gross abnormalities.  ENT:  MMM no lesions  Neck:  supple and no masses  Chest : clear to auscultation bilaterally- no wheezes, rales or rhonchi, normal air movement, no respiratory distress  Heart:: Heart sounds are normal.  Regular rate and rhythm without murmur, gallop or rub.  ABD:  symmetric, soft, non-tender  Musculoskeletal : No edema, skin of left

## 2025-07-27 NOTE — PROGRESS NOTES
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      Review of Systems:   Review of Systems  Afebrile    Physical Examination:    BP (!) 97/45   Pulse 62   Temp 97.9 °F (36.6 °C) (Axillary)   Resp 18   Ht 1.778 m (5' 10\")   Wt 66.2 kg (145 lb 14.4 oz)   SpO2 96%   BMI 20.93 kg/m²    Physical Exam  Alert and orient x 3  Cardiovascular regular rate and rhythm rhythm  Lungs lungs clear to auscultation bilaterally  Right groin soft no hematoma noted.  Chronic ecchymosis noted of the skin.  Left leg compartments are soft.  Left PT 2+ left peroneal triphasic left anterior tibial multiphasic  Left foot is warm and appears well-perfused skin however remains mottled    LABS:  CBC:   Lab Results   Component Value Date/Time    WBC 17.5 07/27/2025 06:53 AM    RBC 2.91 07/27/2025 06:53 AM    HGB 9.0 07/27/2025 06:53 AM    HCT 25.6 07/27/2025 06:53 AM    MCV 88.0 07/27/2025 06:53 AM    MCH 30.9 07/27/2025 06:53 AM    MCHC 35.2 07/27/2025 06:53 AM    RDW 14.1 07/27/2025 06:53 AM     07/27/2025 06:53 AM     CBC with Differential:   Lab Results   Component Value Date/Time    WBC 17.5 07/27/2025 06:53 AM    RBC 2.91 07/27/2025 06:53 AM    HGB 9.0 07/27/2025 06:53 AM    HCT 25.6 07/27/2025 06:53 AM     07/27/2025 06:53 AM    MCV 88.0 07/27/2025 06:53 AM    MCH 30.9 07/27/2025 06:53 AM    MCHC 35.2 07/27/2025 06:53 AM    RDW 14.1 07/27/2025 06:53 AM    LYMPHOPCT 11.0 07/24/2025 04:59 AM    MONOPCT 7.6 07/24/2025 04:59 AM    EOSPCT 1.1 07/24/2025 04:59 AM    BASOPCT 0.3 07/24/2025 04:59 AM    MONOSABS 1.3 07/24/2025 04:59 AM    LYMPHSABS 1.7 07/24/2025 04:59 AM    EOSABS 0.0 07/24/2025 04:59 AM    BASOSABS 0.0 07/24/2025 04:59 AM  calcified and noncalcified atherosclerotic plaque associated with the abdominal aorta.  The abdominal aorta is ectatic measuring up to 2.7 cm in greatest dimension.  No significant stenosis involving SMA E or celiac trunk.  No stenosis involving the renal arteries. Right lower extremity: There is a stent located in the right common and external iliac arteries. This stent is patent.  There is mild stenosis involving right common iliac artery proximal to the stent.  Right external iliac arteries patent without evidence of stenosis.  Right common femoral artery is patent.  Profundal is patent.  Right superficial femoral artery is patent without evidence of stenosis.  Right popliteal artery is patent.  Right posterior tibial artery is occluded.  Right anterior tibial artery is patent.  Patent right dorsalis pedis artery. Left lower extremity: Left common iliac artery and external iliac arteries are patent without evidence of stenosis.  Left common femoral artery is patent.  Profundal is patent.  Left femoral popliteal bypass graft is occluded.  Left popliteal artery is occluded.  Left anterior and posterior tibial arteries are occluded.  No blood flow seen at level of the Darlene left dorsalis pedis artery. No subcutaneous gas associated with right or left foot, ankle, or leg.  No loculated fluid collections.  No synovial effusion involving right or left knee. The liver, gallbladder, pancreas, and spleen are unremarkable.  Adrenal glands are unremarkable.  No hydronephrosis.  There is nonspecific bilateral perinephric fat stranding which may indicate chronic medical renal disease with appropriate clinical history.  There is normal attenuation to both kidneys.  No bowel obstruction, free air, or free fluid.  The appendix is not definitively visualized.  Urinary bladder is unremarkable.  There is a small fat containing left inguinal hernia measuring up to 2.3 cm.  No evidence of pneumonia in the visualized lower lung

## 2025-07-27 NOTE — FLOWSHEET NOTE
[id number]    Shift Summary    1900-  Bedside change of shift report received. Assumed care of this pt at this time. Skin and ID band check completed. Large bruise noted to right groin, redness noted to left foot/toes. Pulses present via doppler in left foot: pedal/post tibial.     2000-  PM assessment competed, see flowsheet for details.     2100-  PM medications administered, see MAR for details.     New orders per Dr Gallardo, pharmacy confirmed and reviewed order with this RN, Heparin gtt dose/rate adjusted at this time, see MAR for details. .     0700-  Bedside change of shift report given.     AM labs obtained     I/O's completed.

## 2025-07-27 NOTE — PLAN OF CARE
Problem: Discharge Planning  Goal: Discharge to home or other facility with appropriate resources  7/26/2025 2229 by Davide Piedra, RN  Outcome: Progressing  Flowsheets (Taken 7/26/2025 2000)  Discharge to home or other facility with appropriate resources:   Identify barriers to discharge with patient and caregiver   Arrange for needed discharge resources and transportation as appropriate   Identify discharge learning needs (meds, wound care, etc)   Arrange for interpreters to assist at discharge as needed   Refer to discharge planning if patient needs post-hospital services based on physician order or complex needs related to functional status, cognitive ability or social support system  7/26/2025 1625 by Nidhi Colin, RN  Outcome: Progressing     Problem: Pain  Goal: Verbalizes/displays adequate comfort level or baseline comfort level  7/26/2025 2229 by Davide Piedra, RN  Outcome: Progressing  Flowsheets (Taken 7/26/2025 2000)  Verbalizes/displays adequate comfort level or baseline comfort level:   Encourage patient to monitor pain and request assistance   Assess pain using appropriate pain scale   Administer analgesics based on type and severity of pain and evaluate response  7/26/2025 1625 by Nidhi Colin, RN  Outcome: Progressing     Problem: Safety - Adult  Goal: Free from fall injury  7/26/2025 2229 by Davide Piedra, RN  Outcome: Progressing  7/26/2025 1625 by Nidhi Colin, RN  Outcome: Progressing     Problem: Skin/Tissue Integrity  Goal: Skin integrity remains intact  Description: 1.  Monitor for areas of redness and/or skin breakdown  2.  Assess vascular access sites hourly  3.  Every 4-6 hours minimum:  Change oxygen saturation probe site  4.  Every 4-6 hours:  If on nasal continuous positive airway pressure, respiratory therapy assess nares and determine need for appliance change or resting period  7/26/2025 2229 by Davide Piedra, RN  Outcome: Progressing  Flowsheets (Taken

## 2025-07-27 NOTE — PROGRESS NOTES
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]  Acute occlusion of artery of lower extremity [I70.209]   Chief Complaint   Patient presents with    Leg Pain     Pt was sent to the ER by his doctors office for no pulse in his LLE,      Patient Active Problem List    Diagnosis Date Noted    Acute occlusion of artery of lower extremity 2025    History of arterial occlusion 2025    Femoral artery occlusion, left 2025    Tobacco use 2024    Dyspnea 2024    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    Aneurysm of iliac artery, right 2024    Spinal stenosis at L4-L5 level 2024    Erectile dysfunction 2024    Transaminitis 2024    Calf ulcer, left, limited to breakdown of skin (HCC) 2024    Atherosclerosis of native artery of left lower extremity with intermittent claudication 10/04/2023    Anxiety state 2023    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) 2023    GERD (gastroesophageal reflux disease) 2023    Substance induced mood disorder (HCC) 2022        Past Medical History:   Diagnosis Date    Alcoholism (HCC)     Anxiety     Atherosclerosis of native artery of left lower extremity with intermittent claudication     Depression     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)     GERD (gastroesophageal reflux disease)     Hx of blood clots     Hyperlipidemia     Hypertension     PVD (peripheral vascular disease)     Spinal stenosis at L4-L5 level      Past Surgical History:   Procedure Laterality Date    ABDOMINAL AORTIC ANEURYSM REPAIR, ENDOVASCULAR Right 8/15/2024    COVERED TUBE STENT FOR COMMON AND EXTERNAL ILIAC ARTERIES RIGHT. performed by Naomi Gallardo,

## 2025-07-27 NOTE — PROGRESS NOTES
Hospitalist Progress Note      PCP: Lizandro Ruffin MD    Date of Admission: 7/23/2025    Chief Complaint:    Chief Complaint   Patient presents with    Leg Pain     Pt was sent to the ER by his doctors office for no pulse in his LLE,      Subjective:  No acute events overnight. Pt resting comfortably in bed. Reports his LLE pain is well controlled. Denies chest pain or shortness of breath.    Medications:  Reviewed    Infusion Medications    heparin (PORCINE) Infusion 20.6 Units/kg/hr (07/27/25 1341)    sodium chloride Stopped (07/24/25 2032)     Scheduled Medications    metoprolol tartrate  50 mg Oral BID    docusate sodium  100 mg Oral BID    losartan  25 mg Oral Daily    amLODIPine  5 mg Oral Daily    sertraline  50 mg Oral Daily    aspirin  81 mg Oral Daily    atorvastatin  20 mg Oral Daily    clopidogrel  75 mg Oral Daily    gabapentin  400 mg Oral TID    pantoprazole  40 mg Oral QAM AC    nicotine  1 patch TransDERmal Daily    sodium chloride  500 mL Intra-arTERial Once    sodium chloride flush  5-40 mL IntraVENous 2 times per day     PRN Meds: HYDROmorphone, sodium chloride flush, sodium chloride, potassium chloride **OR** potassium alternative oral replacement **OR** potassium chloride, magnesium sulfate, ondansetron **OR** ondansetron, polyethylene glycol, acetaminophen **OR** acetaminophen      Intake/Output Summary (Last 24 hours) at 7/27/2025 1634  Last data filed at 7/27/2025 0600  Gross per 24 hour   Intake 681.46 ml   Output 1700 ml   Net -1018.54 ml     Exam:  BP (!) 87/55   Pulse 74   Temp 97.7 °F (36.5 °C) (Oral)   Resp 15   Ht 1.778 m (5' 10\")   Wt 66.2 kg (145 lb 14.4 oz)   SpO2 97%   BMI 20.93 kg/m²   Physical Exam  Cardiovascular:      Rate and Rhythm: Normal rate and regular rhythm.   Pulmonary:      Effort: Pulmonary effort is normal. No respiratory distress.   Abdominal:      Palpations: Abdomen is soft.      Tenderness: There is no abdominal tenderness.   Musculoskeletal:

## 2025-07-27 NOTE — PROGRESS NOTES
Left lower leg dressing changed. Wet to dry as ordered. Pt tolerated well after being premedicated.

## 2025-07-27 NOTE — PROGRESS NOTES
Pt awake alert and oriented x 3. Up in bed for breakfast. Dressing remains dry and intact. Doppled pulses.

## 2025-07-27 NOTE — PROGRESS NOTES
Hospitalist Progress Note      PCP: Lizandro Ruffin MD    Date of Admission: 7/23/2025    Chief Complaint:    Chief Complaint   Patient presents with    Leg Pain     Pt was sent to the ER by his doctors office for no pulse in his LLE,      Subjective:  No acute events overnight. Pt resting comfortably in bed. Reports his LLE pain is well controlled. Denies chest pain or shortness of breath.    Medications:  Reviewed    Infusion Medications    heparin (PORCINE) Infusion 20.6 Units/kg/hr (07/27/25 1341)    sodium chloride Stopped (07/24/25 2032)     Scheduled Medications    metoprolol tartrate  50 mg Oral BID    docusate sodium  100 mg Oral BID    losartan  25 mg Oral Daily    amLODIPine  5 mg Oral Daily    sertraline  50 mg Oral Daily    aspirin  81 mg Oral Daily    atorvastatin  20 mg Oral Daily    clopidogrel  75 mg Oral Daily    gabapentin  400 mg Oral TID    pantoprazole  40 mg Oral QAM AC    nicotine  1 patch TransDERmal Daily    sodium chloride  500 mL Intra-arTERial Once    sodium chloride flush  5-40 mL IntraVENous 2 times per day     PRN Meds: HYDROmorphone, sodium chloride flush, sodium chloride, potassium chloride **OR** potassium alternative oral replacement **OR** potassium chloride, magnesium sulfate, ondansetron **OR** ondansetron, polyethylene glycol, acetaminophen **OR** acetaminophen      Intake/Output Summary (Last 24 hours) at 7/27/2025 1457  Last data filed at 7/27/2025 0600  Gross per 24 hour   Intake 921.46 ml   Output 2150 ml   Net -1228.54 ml     Exam:  BP (!) 127/103  Pulse 92  Temp 98 °F (36.7 °C) (Oral)  Resp (!) 8  Ht 1.778 m (5' 10\")  Wt 66.2 kg (145 lb 14.4 oz)  SpO2 100%  BMI 20.93 kg/m²   Physical Exam  Cardiovascular:      Rate and Rhythm: Normal rate and regular rhythm.   Pulmonary:      Effort: Pulmonary effort is normal. No respiratory distress.   Abdominal:      Palpations: Abdomen is soft.      Tenderness: There is no abdominal tenderness.   Musculoskeletal:

## 2025-07-28 LAB
ALBUMIN SERPL-MCNC: 3.4 G/DL (ref 3.5–4.6)
ALP SERPL-CCNC: 95 U/L (ref 35–104)
ALT SERPL-CCNC: 103 U/L (ref 0–41)
ANION GAP SERPL CALCULATED.3IONS-SCNC: 11 MEQ/L (ref 9–15)
ANTI-XA UNFRAC HEPARIN: 0.34 IU/ML
ANTI-XA UNFRAC HEPARIN: 0.37 IU/ML
AST SERPL-CCNC: 258 U/L (ref 0–40)
BILIRUB SERPL-MCNC: 0.6 MG/DL (ref 0.2–0.7)
BUN SERPL-MCNC: 10 MG/DL (ref 8–23)
CALCIUM SERPL-MCNC: 8.1 MG/DL (ref 8.5–9.9)
CHLORIDE SERPL-SCNC: 98 MEQ/L (ref 95–107)
CO2 SERPL-SCNC: 24 MEQ/L (ref 20–31)
CREAT SERPL-MCNC: 0.55 MG/DL (ref 0.7–1.2)
EKG ATRIAL RATE: 159 BPM
EKG DIAGNOSIS: NORMAL
EKG Q-T INTERVAL: 296 MS
EKG QRS DURATION: 84 MS
EKG QTC CALCULATION (BAZETT): 446 MS
EKG R AXIS: 63 DEGREES
EKG T AXIS: 265 DEGREES
EKG VENTRICULAR RATE: 137 BPM
ERYTHROCYTE [DISTWIDTH] IN BLOOD BY AUTOMATED COUNT: 14.3 % (ref 11.5–14.5)
GLOBULIN SER CALC-MCNC: 2.2 G/DL (ref 2.3–3.5)
GLUCOSE SERPL-MCNC: 118 MG/DL (ref 70–99)
HCT VFR BLD AUTO: 24.5 % (ref 42–52)
HGB BLD-MCNC: 8.6 G/DL (ref 14–18)
MCH RBC QN AUTO: 31.3 PG (ref 27–31.3)
MCHC RBC AUTO-ENTMCNC: 35.1 % (ref 33–37)
MCV RBC AUTO: 89.1 FL (ref 79–92.2)
PLATELET # BLD AUTO: 153 K/UL (ref 130–400)
POTASSIUM SERPL-SCNC: 3.7 MEQ/L (ref 3.4–4.9)
PROT SERPL-MCNC: 5.6 G/DL (ref 6.3–8)
RBC # BLD AUTO: 2.75 M/UL (ref 4.7–6.1)
SODIUM SERPL-SCNC: 133 MEQ/L (ref 135–144)
WBC # BLD AUTO: 15.9 K/UL (ref 4.8–10.8)

## 2025-07-28 PROCEDURE — 6370000000 HC RX 637 (ALT 250 FOR IP): Performed by: SURGERY

## 2025-07-28 PROCEDURE — 36415 COLL VENOUS BLD VENIPUNCTURE: CPT

## 2025-07-28 PROCEDURE — 85027 COMPLETE CBC AUTOMATED: CPT

## 2025-07-28 PROCEDURE — 93010 ELECTROCARDIOGRAM REPORT: CPT | Performed by: INTERNAL MEDICINE

## 2025-07-28 PROCEDURE — 2500000003 HC RX 250 WO HCPCS: Performed by: SURGERY

## 2025-07-28 PROCEDURE — 97535 SELF CARE MNGMENT TRAINING: CPT

## 2025-07-28 PROCEDURE — 97116 GAIT TRAINING THERAPY: CPT

## 2025-07-28 PROCEDURE — 99233 SBSQ HOSP IP/OBS HIGH 50: CPT | Performed by: INTERNAL MEDICINE

## 2025-07-28 PROCEDURE — 80053 COMPREHEN METABOLIC PANEL: CPT

## 2025-07-28 PROCEDURE — 85520 HEPARIN ASSAY: CPT

## 2025-07-28 PROCEDURE — 2000000000 HC ICU R&B

## 2025-07-28 PROCEDURE — 6370000000 HC RX 637 (ALT 250 FOR IP): Performed by: STUDENT IN AN ORGANIZED HEALTH CARE EDUCATION/TRAINING PROGRAM

## 2025-07-28 PROCEDURE — 6360000002 HC RX W HCPCS: Performed by: SURGERY

## 2025-07-28 PROCEDURE — 6370000000 HC RX 637 (ALT 250 FOR IP): Performed by: INTERNAL MEDICINE

## 2025-07-28 RX ADMIN — GABAPENTIN 400 MG: 400 CAPSULE ORAL at 08:58

## 2025-07-28 RX ADMIN — OXYCODONE 10 MG: 5 TABLET ORAL at 14:41

## 2025-07-28 RX ADMIN — ASPIRIN 81 MG: 81 TABLET, DELAYED RELEASE ORAL at 08:58

## 2025-07-28 RX ADMIN — Medication 10 ML: at 19:56

## 2025-07-28 RX ADMIN — HEPARIN SODIUM 20.6 UNITS/KG/HR: 10000 INJECTION, SOLUTION INTRAVENOUS at 09:01

## 2025-07-28 RX ADMIN — ACETAMINOPHEN 650 MG: 325 TABLET ORAL at 12:21

## 2025-07-28 RX ADMIN — GABAPENTIN 400 MG: 400 CAPSULE ORAL at 16:29

## 2025-07-28 RX ADMIN — METOPROLOL TARTRATE 50 MG: 50 TABLET, FILM COATED ORAL at 19:56

## 2025-07-28 RX ADMIN — ATORVASTATIN CALCIUM 20 MG: 20 TABLET, FILM COATED ORAL at 08:58

## 2025-07-28 RX ADMIN — OXYCODONE 10 MG: 5 TABLET ORAL at 20:41

## 2025-07-28 RX ADMIN — LOSARTAN POTASSIUM 25 MG: 25 TABLET, FILM COATED ORAL at 08:58

## 2025-07-28 RX ADMIN — POLYETHYLENE GLYCOL 3350 17 G: 17 POWDER, FOR SOLUTION ORAL at 16:30

## 2025-07-28 RX ADMIN — PANTOPRAZOLE SODIUM 40 MG: 40 TABLET, DELAYED RELEASE ORAL at 06:49

## 2025-07-28 RX ADMIN — CLOPIDOGREL BISULFATE 75 MG: 75 TABLET, FILM COATED ORAL at 08:58

## 2025-07-28 RX ADMIN — DOCUSATE SODIUM 100 MG: 100 CAPSULE, LIQUID FILLED ORAL at 19:56

## 2025-07-28 RX ADMIN — DOCUSATE SODIUM 100 MG: 100 CAPSULE, LIQUID FILLED ORAL at 08:58

## 2025-07-28 RX ADMIN — SERTRALINE 50 MG: 50 TABLET, FILM COATED ORAL at 08:58

## 2025-07-28 RX ADMIN — GABAPENTIN 400 MG: 400 CAPSULE ORAL at 19:56

## 2025-07-28 RX ADMIN — AMLODIPINE BESYLATE 5 MG: 5 TABLET ORAL at 08:58

## 2025-07-28 RX ADMIN — METOPROLOL TARTRATE 50 MG: 50 TABLET, FILM COATED ORAL at 08:58

## 2025-07-28 RX ADMIN — OXYCODONE 10 MG: 5 TABLET ORAL at 06:49

## 2025-07-28 ASSESSMENT — PAIN DESCRIPTION - DESCRIPTORS
DESCRIPTORS: ACHING;DISCOMFORT
DESCRIPTORS: ACHING
DESCRIPTORS: PINS AND NEEDLES;THROBBING

## 2025-07-28 ASSESSMENT — PAIN DESCRIPTION - LOCATION
LOCATION: LEG
LOCATION: FOOT;LEG
LOCATION: LEG

## 2025-07-28 ASSESSMENT — PAIN DESCRIPTION - ORIENTATION
ORIENTATION: LEFT

## 2025-07-28 ASSESSMENT — PAIN SCALES - GENERAL
PAINLEVEL_OUTOF10: 0
PAINLEVEL_OUTOF10: 0
PAINLEVEL_OUTOF10: 8
PAINLEVEL_OUTOF10: 7
PAINLEVEL_OUTOF10: 6
PAINLEVEL_OUTOF10: 8
PAINLEVEL_OUTOF10: 4

## 2025-07-28 ASSESSMENT — PAIN - FUNCTIONAL ASSESSMENT: PAIN_FUNCTIONAL_ASSESSMENT: ACTIVITIES ARE NOT PREVENTED

## 2025-07-28 NOTE — PROGRESS NOTES
Progress Note  Patient: Garry [facility]  Unit/Bed: IC07/IC07-01  YOB: 1960  MRN: [id number]  Acct: [id number]   Admitting Diagnosis: Femoral artery occlusion, left [I70.202]  Acute occlusion of artery of lower extremity [I70.209]  Date:  7/23/2025  Hospital Day: 5      Subjective  Patient denies pain in the left foot.      Review of Systems:   Review of Systems  Afebrile    Physical Examination:    BP (!) 89/72   Pulse 68   Temp 98.5 °F (36.9 °C) (Oral)   Resp 13   Ht 1.778 m (5' 10\")   Wt 66.2 kg (145 lb 14.4 oz)   SpO2 96%   BMI 20.93 kg/m²    Physical Exam  Alert and orient x 3  Cardiovascular regular rate and rhythm rhythm  Lungs lungs clear to auscultation bilaterally  Right groin soft no hematoma noted.  Chronic ecchymosis noted of the skin.  Left leg compartments are soft.  Left PT 2+ left peroneal triphasic left anterior tibial multiphasic  Left foot is warm and appears well-perfused skin however remains mottled    LABS:  CBC:   Lab Results   Component Value Date/Time    WBC 15.9 07/28/2025 04:57 AM    RBC 2.75 07/28/2025 04:57 AM    HGB 8.6 07/28/2025 04:57 AM    HCT 24.5 07/28/2025 04:57 AM    MCV 89.1 07/28/2025 04:57 AM    MCH 31.3 07/28/2025 04:57 AM    MCHC 35.1 07/28/2025 04:57 AM    RDW 14.3 07/28/2025 04:57 AM     07/28/2025 04:57 AM     CBC with Differential:   Lab Results   Component Value Date/Time    WBC 15.9 07/28/2025 04:57 AM    RBC 2.75 07/28/2025 04:57 AM    HGB 8.6 07/28/2025 04:57 AM    HCT 24.5 07/28/2025 04:57 AM     07/28/2025 04:57 AM    MCV 89.1 07/28/2025 04:57 AM    MCH 31.3 07/28/2025 04:57 AM    MCHC 35.1 07/28/2025 04:57 AM    RDW 14.3 07/28/2025 04:57 AM    LYMPHOPCT 11.0 07/24/2025 04:59 AM    MONOPCT 7.6 07/24/2025 04:59 AM    EOSPCT 1.1 07/24/2025 04:59 AM    BASOPCT 0.3 07/24/2025 04:59 AM    MONOSABS 1.3 07/24/2025 04:59 AM    LYMPHSABS 1.7 07/24/2025 04:59 AM    EOSABS 0.0 07/24/2025 04:59 AM    BASOSABS 0.0 07/24/2025 04:59 AM     CMP:

## 2025-07-28 NOTE — INTERDISCIPLINARY ROUNDS
Spiritual Care Services     Summary of Visit:   participated in ICU rounds. No immediate spiritual care needs identified and patient to possibly transfer out of ICU later today.    Encounter Summary  Encounter Overview/Reason: Interdisciplinary rounds  Encounter Code:  Assessment by  services  Service Provided For: Patient  Referral/Consult From: Rounding  Support System: Children, Other (Comment) (patients girl-friend)  Complexity of Encounter: Low  Begin Time: 1115  End Time : 1130  Total Time Calculated: 15 min        Spiritual/Emotional needs  Type: Spiritual Support                 Advance Care Planning  Type: ACP conversation, Completed AD/ACP document(s)    Spiritual Assessment/Intervention/Outcomes:    Assessment: Calm, Peaceful, Hopeful, Coping    Intervention: Explored/Affirmed feelings, thoughts, concerns, Empowerment, Nurtured Hope, Explored Coping Skills/Resources, Discussed meaning/purpose, Active listening    Outcome: Coping, Receptive, Peace, Engaged in conversation, Comfort, Encouraged      Care Plan:    Plan and Referrals  Plan/Referrals: Continue Support (comment)     to provide additional spiritual support as needed or requested      Spiritual Care Services   Electronically signed by Chaplain Bryanna on 7/28/2025 at 1:41 PM.    To reach a  for emotional and spiritual support, place an EPIC consult request.   If a  is needed immediately, dial “0” and ask to page the on-call .

## 2025-07-28 NOTE — PROGRESS NOTES
Hospitalist Progress Note      PCP: Lizandro Ruffin MD    Date of Admission: 7/23/2025    Chief Complaint:    Chief Complaint   Patient presents with    Leg Pain     Pt was sent to the ER by his doctors office for no pulse in his LLE,        Subjective:  Patient denies fevers, chills, sweats, CP, SOB, diarrhea or burning micturition.  12 point ROS negative other than mentioned above       Medications:  Reviewed    Infusion Medications    heparin (PORCINE) Infusion 20.6 Units/kg/hr (07/28/25 0901)    sodium chloride Stopped (07/24/25 2032)     Scheduled Medications    metoprolol tartrate  50 mg Oral BID    docusate sodium  100 mg Oral BID    losartan  25 mg Oral Daily    amLODIPine  5 mg Oral Daily    sertraline  50 mg Oral Daily    aspirin  81 mg Oral Daily    atorvastatin  20 mg Oral Daily    clopidogrel  75 mg Oral Daily    gabapentin  400 mg Oral TID    pantoprazole  40 mg Oral QAM AC    nicotine  1 patch TransDERmal Daily    sodium chloride  500 mL Intra-arTERial Once    sodium chloride flush  5-40 mL IntraVENous 2 times per day     PRN Meds: oxyCODONE **OR** oxyCODONE, sodium chloride flush, sodium chloride, potassium chloride **OR** potassium alternative oral replacement **OR** potassium chloride, magnesium sulfate, ondansetron **OR** ondansetron, polyethylene glycol, acetaminophen **OR** acetaminophen      Intake/Output Summary (Last 24 hours) at 7/28/2025 1807  Last data filed at 7/28/2025 0600  Gross per 24 hour   Intake 720 ml   Output 750 ml   Net -30 ml       Exam:    BP (!) 89/72   Pulse 68   Temp 98.5 °F (36.9 °C) (Oral)   Resp 13   Ht 1.778 m (5' 10\")   Wt 66.2 kg (145 lb 14.4 oz)   SpO2 96%   BMI 20.93 kg/m²     General appearance: No apparent distress, appears stated age and cooperative.  HEENT:  Conjunctivae/corneas clear.  Neck: Trachea midline.  Respiratory:  Normal respiratory effort. Clear to auscultation  Cardiovascular: Regular rate and rhythm  Abdomen: Soft, non-tender,  non-distended with normal bowel sounds.  Musculoskeletal: No clubbing, cyanosis or edema bilaterally  Neuro: Non Focal.   Capillary Refill: Brisk,< 3 seconds   Peripheral Pulses: +2 palpable, equal bilaterally       Labs:   Recent Labs     07/27/25  0421 07/27/25  0653 07/28/25  0457   WBC 17.7* 17.5* 15.9*   HGB 9.7* 9.0* 8.6*   HCT 26.8* 25.6* 24.5*    129* 153     Recent Labs     07/27/25  0621 07/28/25 0457   * 133*   K 3.9 3.7   CL 97 98   CO2 24 24   BUN 10 10   CREATININE 0.53* 0.55*   CALCIUM 8.4* 8.1*     Recent Labs     07/27/25 0621 07/28/25 0457   * 258*   * 103*   BILITOT 0.7 0.6   ALKPHOS 104 95     No results for input(s): \"INR\" in the last 72 hours.  No results for input(s): \"CKTOTAL\", \"TROPONINI\" in the last 72 hours.    Urinalysis:      Lab Results   Component Value Date/Time    NITRU Negative 02/08/2022 08:00 PM    WBCUA 0-2 02/08/2022 08:00 PM    BACTERIA Negative 02/08/2022 08:00 PM    RBCUA 3-5 02/08/2022 08:00 PM    BLOODU Negative 02/08/2022 08:00 PM    GLUCOSEU Negative 02/08/2022 08:00 PM       Radiology:  FL LESS THAN 1 HOUR   Final Result      FL LESS THAN 1 HOUR   Final Result      CTA ABDOMINAL AORTA W BILAT RUNOFF W WO CONTRAST   Final Result   1. Occluded left femoral-popliteal bypass graft.   2. Occluded left popliteal artery.   3. Occluded left anterior and posterior tibial arteries.   4. No blood flow seen at level of left dorsalis pedis artery.   5. Patent right common and external iliac artery stent.   6. Occluded right posterior tibial artery.   7. Ectatic abdominal aorta measuring up to 2.7 cm in greatest dimension.   Follow-up imaging recommended every 5 years for further evaluation.         Vascular duplex lower extremity arteries left   Final Result   Occluded native arteries of left lower extremity distal to the common femoral   artery as well as occluded bypass graft.           Assessment/Plan:    Acute LLE arterial occlusion with history of

## 2025-07-28 NOTE — PROGRESS NOTES
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]  Acute occlusion of artery of lower extremity [I70.209]   Chief Complaint   Patient presents with    Leg Pain     Pt was sent to the ER by his doctors office for no pulse in his LLE,      Patient Active Problem List    Diagnosis Date Noted    Acute occlusion of artery of lower extremity 2025    History of arterial occlusion 2025    Femoral artery occlusion, left 2025    Tobacco use 2024    Dyspnea 2024    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    Aneurysm of iliac artery, right 2024    Spinal stenosis at L4-L5 level 2024    Erectile dysfunction 2024    Transaminitis 2024    Calf ulcer, left, limited to breakdown of skin (HCC) 2024    Atherosclerosis of native artery of left lower extremity with intermittent claudication 10/04/2023    Anxiety state 2023    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) 2023    GERD (gastroesophageal reflux disease) 2023    Substance induced mood disorder (HCC) 2022        Past Medical History:   Diagnosis Date    Alcoholism (HCC)     Anxiety     Atherosclerosis of native artery of left lower extremity with intermittent claudication     Depression     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)     GERD (gastroesophageal reflux disease)     Hx of blood clots     Hyperlipidemia     Hypertension     PVD (peripheral vascular disease)     Spinal stenosis at L4-L5 level      Past Surgical History:   Procedure Laterality Date    ABDOMINAL AORTIC ANEURYSM REPAIR, ENDOVASCULAR Right 8/15/2024    COVERED TUBE STENT FOR COMMON AND EXTERNAL ILIAC ARTERIES RIGHT. performed by Naomi Gallardo,

## 2025-07-28 NOTE — PLAN OF CARE
Problem: Discharge Planning  Goal: Discharge to home or other facility with appropriate resources  Outcome: Progressing  Flowsheets (Taken 7/27/2025 2000)  Discharge to home or other facility with appropriate resources:   Identify barriers to discharge with patient and caregiver   Arrange for needed discharge resources and transportation as appropriate   Identify discharge learning needs (meds, wound care, etc)   Arrange for interpreters to assist at discharge as needed   Refer to discharge planning if patient needs post-hospital services based on physician order or complex needs related to functional status, cognitive ability or social support system     Problem: Pain  Goal: Verbalizes/displays adequate comfort level or baseline comfort level  Outcome: Progressing  Flowsheets (Taken 7/27/2025 2000)  Verbalizes/displays adequate comfort level or baseline comfort level:   Encourage patient to monitor pain and request assistance   Assess pain using appropriate pain scale   Administer analgesics based on type and severity of pain and evaluate response     Problem: Safety - Adult  Goal: Free from fall injury  Outcome: Progressing     Problem: Skin/Tissue Integrity  Goal: Skin integrity remains intact  Description: 1.  Monitor for areas of redness and/or skin breakdown  2.  Assess vascular access sites hourly  3.  Every 4-6 hours minimum:  Change oxygen saturation probe site  4.  Every 4-6 hours:  If on nasal continuous positive airway pressure, respiratory therapy assess nares and determine need for appliance change or resting period  Outcome: Progressing  Flowsheets  Taken 7/27/2025 2000 by Davide Piedra, RN  Skin Integrity Remains Intact:   Monitor for areas of redness and/or skin breakdown   Assess vascular access sites hourly  Taken 7/27/2025 1113 by Nidhi Colin, RN  Skin Integrity Remains Intact:   Monitor for areas of redness and/or skin breakdown   Assess vascular access sites hourly   Every 4-6 hours

## 2025-07-28 NOTE — FLOWSHEET NOTE
[id number]     Shift Summary     1900-  Bedside change of shift report received. Assumed care of this pt at this time. Skin and ID band check completed. Large bruise noted to right groin, redness noted to left foot/toes. Pulses present via doppler in left foot: pedal/post tibial.      2000-  PM assessment competed, see flowsheet for details.      2100-  PM medications administered, see MAR for details.      0000-  No acute distress noted.    0400-  No acute distress noted.     0700-  Bedside change of shift report given.      AM labs obtained     I/O's completed.     Pt had two consecutive XA's come back in range, order modified to now obtain XA's daily with AM labs.

## 2025-07-28 NOTE — CARE COORDINATION
I met with patient to discuss dc plan and poss rehab at rehab vs SNF. He states he is going home on dc and not anywhere else. He is agreeable to [facility] and states he had [facility] in past he thinks. He has drsg changes right now daily. I messaged Dr. Royal to see if he can have OT and let him know that he agrees to C.

## 2025-07-28 NOTE — PROGRESS NOTES
Pulmonary & Critical Care Medicine ICU Progress Note  Chief complaint : Ischemic foot    Subjunctive/24 hour events :   Patient seen and examined during multidisciplinary rounds with RN, charge nurse, RT, pharmacy, dietitian, and social service.   No issues, no chest pain, abdominal pain, no nausea or vomiting, no bowel movement, he is on room air saturating 95%.        New information updated in the note today, rest of the examination did not change compared to yesterday.  Social History     Tobacco Use    Smoking status: Every Day     Current packs/day: 1.00     Average packs/day: 1 pack/day for 20.0 years (20.0 ttl pk-yrs)     Types: Cigarettes     Passive exposure: Never    Smokeless tobacco: Never   Substance Use Topics    Alcohol use: Not Currently     Comment: pt has not had a drink in 1 yr and 2 months/history of alcoholism         Problem Relation Age of Onset    Heart Disease Father        No results for input(s): \"PHART\", \"AJR3ADS\", \"PO2ART\" in the last 72 hours.    MV Settings:     / / /            IV:   heparin (PORCINE) Infusion 20.6 Units/kg/hr (07/28/25 0617)    sodium chloride Stopped (07/24/25 2032)       Vitals:  /85   Pulse 86   Temp 98 °F (36.7 °C) (Oral)   Resp 19   Ht 1.778 m (5' 10\")   Wt 66.2 kg (145 lb 14.4 oz)   SpO2 97%   BMI 20.93 kg/m²    Tmax:        Intake/Output Summary (Last 24 hours) at 7/28/2025 0749  Last data filed at 7/28/2025 0600  Gross per 24 hour   Intake 1475 ml   Output 1450 ml   Net 25 ml       EXAM:  General: alert, cooperative, no distress  Head: normocephalic, atraumatic  Eyes:No gross abnormalities.  ENT:  MMM no lesions  Neck:  supple and no masses  Chest : clear to auscultation bilaterally- no wheezes, rales or rhonchi, normal air movement, no respiratory distress  Heart:: Heart sounds are normal.  Regular rate and rhythm without murmur, gallop or rub.  ABD:  symmetric, soft, non-tender  Musculoskeletal : No edema, skin of left foot is mottled surgical

## 2025-07-29 PROBLEM — Z71.89 ADVANCED CARE PLANNING/COUNSELING DISCUSSION: Status: ACTIVE | Noted: 2025-07-29

## 2025-07-29 PROBLEM — Z51.5 PALLIATIVE CARE ENCOUNTER: Status: ACTIVE | Noted: 2025-07-29

## 2025-07-29 PROBLEM — F32.A DEPRESSION: Status: ACTIVE | Noted: 2025-07-29

## 2025-07-29 PROBLEM — Z71.89 GOALS OF CARE, COUNSELING/DISCUSSION: Status: ACTIVE | Noted: 2025-07-29

## 2025-07-29 LAB
ANION GAP SERPL CALCULATED.3IONS-SCNC: 9 MEQ/L (ref 9–15)
ANTI-XA UNFRAC HEPARIN: 0.34 IU/ML
BUN SERPL-MCNC: 14 MG/DL (ref 8–23)
CALCIUM SERPL-MCNC: 8.5 MG/DL (ref 8.5–9.9)
CHLORIDE SERPL-SCNC: 98 MEQ/L (ref 95–107)
CO2 SERPL-SCNC: 24 MEQ/L (ref 20–31)
CREAT SERPL-MCNC: 0.69 MG/DL (ref 0.7–1.2)
ERYTHROCYTE [DISTWIDTH] IN BLOOD BY AUTOMATED COUNT: 14.6 % (ref 11.5–14.5)
GLUCOSE SERPL-MCNC: 111 MG/DL (ref 70–99)
HCT VFR BLD AUTO: 24.3 % (ref 42–52)
HGB BLD-MCNC: 8.5 G/DL (ref 14–18)
MCH RBC QN AUTO: 31.6 PG (ref 27–31.3)
MCHC RBC AUTO-ENTMCNC: 35 % (ref 33–37)
MCV RBC AUTO: 90.3 FL (ref 79–92.2)
PLATELET # BLD AUTO: 204 K/UL (ref 130–400)
POTASSIUM SERPL-SCNC: 4.4 MEQ/L (ref 3.4–4.9)
RBC # BLD AUTO: 2.69 M/UL (ref 4.7–6.1)
SODIUM SERPL-SCNC: 131 MEQ/L (ref 135–144)
WBC # BLD AUTO: 14.6 K/UL (ref 4.8–10.8)

## 2025-07-29 PROCEDURE — 36415 COLL VENOUS BLD VENIPUNCTURE: CPT

## 2025-07-29 PROCEDURE — 2500000003 HC RX 250 WO HCPCS: Performed by: SURGERY

## 2025-07-29 PROCEDURE — 6360000002 HC RX W HCPCS: Performed by: SURGERY

## 2025-07-29 PROCEDURE — 99223 1ST HOSP IP/OBS HIGH 75: CPT | Performed by: NURSE PRACTITIONER

## 2025-07-29 PROCEDURE — 85027 COMPLETE CBC AUTOMATED: CPT

## 2025-07-29 PROCEDURE — 6370000000 HC RX 637 (ALT 250 FOR IP): Performed by: SURGERY

## 2025-07-29 PROCEDURE — 80048 BASIC METABOLIC PNL TOTAL CA: CPT

## 2025-07-29 PROCEDURE — 85520 HEPARIN ASSAY: CPT

## 2025-07-29 PROCEDURE — 2700000000 HC OXYGEN THERAPY PER DAY

## 2025-07-29 PROCEDURE — 6370000000 HC RX 637 (ALT 250 FOR IP): Performed by: INTERNAL MEDICINE

## 2025-07-29 PROCEDURE — 6370000000 HC RX 637 (ALT 250 FOR IP): Performed by: STUDENT IN AN ORGANIZED HEALTH CARE EDUCATION/TRAINING PROGRAM

## 2025-07-29 PROCEDURE — 2060000000 HC ICU INTERMEDIATE R&B

## 2025-07-29 PROCEDURE — 97166 OT EVAL MOD COMPLEX 45 MIN: CPT

## 2025-07-29 PROCEDURE — 99233 SBSQ HOSP IP/OBS HIGH 50: CPT | Performed by: INTERNAL MEDICINE

## 2025-07-29 RX ORDER — ZOLPIDEM TARTRATE 5 MG/1
10 TABLET ORAL NIGHTLY PRN
Status: DISCONTINUED | OUTPATIENT
Start: 2025-07-29 | End: 2025-07-31 | Stop reason: HOSPADM

## 2025-07-29 RX ADMIN — METOPROLOL TARTRATE 50 MG: 50 TABLET, FILM COATED ORAL at 08:16

## 2025-07-29 RX ADMIN — OXYCODONE 10 MG: 5 TABLET ORAL at 20:57

## 2025-07-29 RX ADMIN — CLOPIDOGREL BISULFATE 75 MG: 75 TABLET, FILM COATED ORAL at 08:16

## 2025-07-29 RX ADMIN — GABAPENTIN 400 MG: 400 CAPSULE ORAL at 08:16

## 2025-07-29 RX ADMIN — OXYCODONE 10 MG: 5 TABLET ORAL at 07:20

## 2025-07-29 RX ADMIN — HEPARIN SODIUM 20.6 UNITS/KG/HR: 10000 INJECTION, SOLUTION INTRAVENOUS at 06:08

## 2025-07-29 RX ADMIN — GABAPENTIN 400 MG: 400 CAPSULE ORAL at 20:51

## 2025-07-29 RX ADMIN — Medication 10 ML: at 07:34

## 2025-07-29 RX ADMIN — OXYCODONE 10 MG: 5 TABLET ORAL at 14:20

## 2025-07-29 RX ADMIN — DOCUSATE SODIUM 100 MG: 100 CAPSULE, LIQUID FILLED ORAL at 08:16

## 2025-07-29 RX ADMIN — DOCUSATE SODIUM 100 MG: 100 CAPSULE, LIQUID FILLED ORAL at 20:51

## 2025-07-29 RX ADMIN — Medication 10 ML: at 20:52

## 2025-07-29 RX ADMIN — PANTOPRAZOLE SODIUM 40 MG: 40 TABLET, DELAYED RELEASE ORAL at 07:20

## 2025-07-29 RX ADMIN — AMLODIPINE BESYLATE 5 MG: 5 TABLET ORAL at 08:16

## 2025-07-29 RX ADMIN — GABAPENTIN 400 MG: 400 CAPSULE ORAL at 14:18

## 2025-07-29 RX ADMIN — METOPROLOL TARTRATE 50 MG: 50 TABLET, FILM COATED ORAL at 20:51

## 2025-07-29 RX ADMIN — ASPIRIN 81 MG: 81 TABLET, DELAYED RELEASE ORAL at 08:16

## 2025-07-29 RX ADMIN — ATORVASTATIN CALCIUM 20 MG: 20 TABLET, FILM COATED ORAL at 08:16

## 2025-07-29 RX ADMIN — LOSARTAN POTASSIUM 25 MG: 25 TABLET, FILM COATED ORAL at 08:16

## 2025-07-29 RX ADMIN — SERTRALINE 50 MG: 50 TABLET, FILM COATED ORAL at 08:16

## 2025-07-29 ASSESSMENT — ENCOUNTER SYMPTOMS
COUGH: 0
CONSTIPATION: 1
WHEEZING: 0
BACK PAIN: 1
SHORTNESS OF BREATH: 0

## 2025-07-29 ASSESSMENT — PAIN DESCRIPTION - DESCRIPTORS
DESCRIPTORS: ACHING;BURNING;NUMBNESS
DESCRIPTORS: NUMBNESS
DESCRIPTORS: NUMBNESS;BURNING
DESCRIPTORS: BURNING

## 2025-07-29 ASSESSMENT — PAIN SCALES - GENERAL
PAINLEVEL_OUTOF10: 3
PAINLEVEL_OUTOF10: 4
PAINLEVEL_OUTOF10: 4
PAINLEVEL_OUTOF10: 8
PAINLEVEL_OUTOF10: 5
PAINLEVEL_OUTOF10: 4
PAINLEVEL_OUTOF10: 7

## 2025-07-29 ASSESSMENT — PAIN DESCRIPTION - PAIN TYPE
TYPE: ACUTE PAIN;SURGICAL PAIN
TYPE: ACUTE PAIN

## 2025-07-29 ASSESSMENT — PAIN DESCRIPTION - FREQUENCY: FREQUENCY: CONTINUOUS

## 2025-07-29 ASSESSMENT — PAIN DESCRIPTION - ORIENTATION
ORIENTATION: LEFT;RIGHT
ORIENTATION: LEFT
ORIENTATION: RIGHT;LEFT
ORIENTATION: LEFT

## 2025-07-29 ASSESSMENT — PAIN DESCRIPTION - LOCATION
LOCATION: LEG

## 2025-07-29 ASSESSMENT — PAIN DESCRIPTION - ONSET: ONSET: ON-GOING

## 2025-07-29 NOTE — PLAN OF CARE
See OT evaluation for all goals and OT POC. Electronically signed by Yulia Leblanc OT on 7/29/2025 at 12:02 PM

## 2025-07-29 NOTE — PROGRESS NOTES
Shift Summary [id number]:    Handoff report received from Jana DOMINGUEZ RN at bedside, skin assessment and pulses doppled.     Head to toe assessment completed, see flowsheets.     2041: PRN oxycodone given for pain 8/10, plan for dressing change once medication start to take affect.     Left lower leg dressing changed, pt tolerated well.

## 2025-07-29 NOTE — CARE COORDINATION
[facility] Pre-Admission Screening Document      Patient Name: Garry Gamez       MRN: [id number]    : 1960    Age: 65 y.o.  Gender: male   Payor: Payor: [employer] [place] / Plan: [employer] [place] / Product Type: *No Product type* /   MSSP: No    Admitted from: [facility] Floor: ICU  Attending Care Provider: Andre Royal MD  Inpatient Rehab Referring Care Provider: Dr. Royal  Primary Care Provider: Lizandro Ruffin MD  Inpatient Treatment Team including Consults: Treatment Team:   Andre Royal MD Garcia, MD Radha Salgado Manaf, MD Garcia, [place], MD Hipp, Lisa, RN Dadas, Lauren A, APRN - Cherie Gavin Sara, RN    Reason for Hospitalization:   1. Acute occlusion of artery of lower extremity      Chief Complaint   Patient presents with    Leg Pain     Pt was sent to the ER by his doctors office for no pulse in his LLE,      Isolation:No active isolations    Hospital Course:  Admit Date: 2025  4:06 PM  Inpatient Rehab Referral Date: 2025  Narrative of hospital course/history of present illness: 65 y.o. male patient sent to ER  d/t initially presenting to PCP office with complaints of left foot pain and mobility issues. C/f occlusion as patient with cool temperature and absent pulses. CTA confirming occlusion to LLE, does also show occluded right posterior tibial artery. Patient admitted and to OR  with vascular for fem-pop bypass. Care c/b compartment syndrome requiring requiring return to OR for fasciotomy. Additional complexity of Afib RVR, HTN but then periods of hypotension s/p starting beta- blocker. Patient on heparin gtt.    Internal Medicine:  Acute LLE arterial occlusion with history of PAD s/p fem-pop bypass and iliac stents  LLE compartment syndrome s/p fasciectomy   - vascular surgery following, s/p vascular surgery interventions on , see op notes for

## 2025-07-29 NOTE — PROGRESS NOTES
Pulmonary & Critical Care Medicine ICU Progress Note  Chief complaint : Ischemic foot    Subjunctive/24 hour events :   Patient seen and examined during multidisciplinary rounds with RN, charge nurse, RT, pharmacy, dietitian, and social service.   No issues, no chest pain, abdominal pain, no nausea or vomiting, no bowel movement, he is on room air saturating 94%.  Did not sleep well overnight        New information updated in the note today, rest of the examination did not change compared to yesterday.  Social History     Tobacco Use    Smoking status: Every Day     Current packs/day: 1.00     Average packs/day: 1 pack/day for 20.0 years (20.0 ttl pk-yrs)     Types: Cigarettes     Passive exposure: Never    Smokeless tobacco: Never   Substance Use Topics    Alcohol use: Not Currently     Comment: pt has not had a drink in 1 yr and 2 months/history of alcoholism         Problem Relation Age of Onset    Heart Disease Father        No results for input(s): \"PHART\", \"AFI6QTB\", \"PO2ART\" in the last 72 hours.    MV Settings:     / / /            IV:   heparin (PORCINE) Infusion 20.6 Units/kg/hr (07/29/25 0730)    sodium chloride Stopped (07/24/25 2032)       Vitals:  /67   Pulse 68   Temp 98.2 °F (36.8 °C) (Oral)   Resp 20   Ht 1.778 m (5' 10\")   Wt 66.2 kg (145 lb 14.4 oz)   SpO2 94%   BMI 20.93 kg/m²    Tmax:        Intake/Output Summary (Last 24 hours) at 7/29/2025 0801  Last data filed at 7/29/2025 0730  Gross per 24 hour   Intake 903.33 ml   Output --   Net 903.33 ml       EXAM:  General: alert, cooperative, no distress  Head: normocephalic, atraumatic  Eyes:No gross abnormalities.  ENT:  MMM no lesions  Neck:  supple and no masses  Chest : clear to auscultation bilaterally- no wheezes, rales or rhonchi, normal air movement, no respiratory distress  Heart:: Heart sounds are normal.  Regular rate and rhythm without murmur, gallop or rub.  ABD:  symmetric, soft, non-tender  Musculoskeletal : No edema, skin

## 2025-07-29 NOTE — CARE COORDINATION
ICU team round done this am at bedside and pt will likely tx out of ICU today. He has Rehab consult and will need insurance auth. Pt now has PT and OT on and he is agreeable to [facility] rehab .

## 2025-07-29 NOTE — PROGRESS NOTES
Hospitalist Progress Note      PCP: Lizandro Ruffin MD    Date of Admission: 7/23/2025    Chief Complaint:    Chief Complaint   Patient presents with    Leg Pain     Pt was sent to the ER by his doctors office for no pulse in his LLE,        Subjective:  Patient denies fevers, chills, sweats, CP, SOB, diarrhea or burning micturition.  12 point ROS negative other than mentioned above       Medications:  Reviewed    Infusion Medications    heparin (PORCINE) Infusion 20.6 Units/kg/hr (07/29/25 0829)    sodium chloride Stopped (07/24/25 2032)     Scheduled Medications    metoprolol tartrate  50 mg Oral BID    docusate sodium  100 mg Oral BID    losartan  25 mg Oral Daily    amLODIPine  5 mg Oral Daily    sertraline  50 mg Oral Daily    aspirin  81 mg Oral Daily    atorvastatin  20 mg Oral Daily    clopidogrel  75 mg Oral Daily    gabapentin  400 mg Oral TID    pantoprazole  40 mg Oral QAM AC    nicotine  1 patch TransDERmal Daily    sodium chloride  500 mL Intra-arTERial Once    sodium chloride flush  5-40 mL IntraVENous 2 times per day     PRN Meds: zolpidem, oxyCODONE **OR** oxyCODONE, sodium chloride flush, sodium chloride, potassium chloride **OR** potassium alternative oral replacement **OR** potassium chloride, magnesium sulfate, ondansetron **OR** ondansetron, polyethylene glycol, acetaminophen **OR** acetaminophen      Intake/Output Summary (Last 24 hours) at 7/29/2025 1612  Last data filed at 7/29/2025 1507  Gross per 24 hour   Intake 1396.78 ml   Output 450 ml   Net 946.78 ml       Exam:    BP (!) 95/59   Pulse 57   Temp 98.2 °F (36.8 °C) (Oral)   Resp 18   Ht 1.778 m (5' 10\")   Wt 66.2 kg (145 lb 14.4 oz)   SpO2 99%   BMI 20.93 kg/m²     General appearance: No apparent distress, appears stated age and cooperative.  HEENT:  Conjunctivae/corneas clear.  Neck: Trachea midline.  Respiratory:  Normal respiratory effort. Clear to auscultation  Cardiovascular: Regular rate and rhythm  Abdomen: Soft,  non-tender, non-distended with normal bowel sounds.  Musculoskeletal: No clubbing, cyanosis or edema bilaterally  Neuro: Non Focal.     Labs:   Recent Labs     07/27/25  0653 07/28/25  0457 07/29/25  0430   WBC 17.5* 15.9* 14.6*   HGB 9.0* 8.6* 8.5*   HCT 25.6* 24.5* 24.3*   * 153 204     Recent Labs     07/27/25  0621 07/28/25  0457 07/29/25  1458   * 133* 131*   K 3.9 3.7 4.4   CL 97 98 98   CO2 24 24 24   BUN 10 10 14   CREATININE 0.53* 0.55* 0.69*   CALCIUM 8.4* 8.1* 8.5     Recent Labs     07/27/25  0621 07/28/25  0457   * 258*   * 103*   BILITOT 0.7 0.6   ALKPHOS 104 95     No results for input(s): \"INR\" in the last 72 hours.  No results for input(s): \"CKTOTAL\", \"TROPONINI\" in the last 72 hours.    Urinalysis:      Lab Results   Component Value Date/Time    NITRU Negative 02/08/2022 08:00 PM    WBCUA 0-2 02/08/2022 08:00 PM    BACTERIA Negative 02/08/2022 08:00 PM    RBCUA 3-5 02/08/2022 08:00 PM    BLOODU Negative 02/08/2022 08:00 PM    GLUCOSEU Negative 02/08/2022 08:00 PM       Radiology:  FL LESS THAN 1 HOUR   Final Result      FL LESS THAN 1 HOUR   Final Result      CTA ABDOMINAL AORTA W BILAT RUNOFF W WO CONTRAST   Final Result   1. Occluded left femoral-popliteal bypass graft.   2. Occluded left popliteal artery.   3. Occluded left anterior and posterior tibial arteries.   4. No blood flow seen at level of left dorsalis pedis artery.   5. Patent right common and external iliac artery stent.   6. Occluded right posterior tibial artery.   7. Ectatic abdominal aorta measuring up to 2.7 cm in greatest dimension.   Follow-up imaging recommended every 5 years for further evaluation.         Vascular duplex lower extremity arteries left   Final Result   Occluded native arteries of left lower extremity distal to the common femoral   artery as well as occluded bypass graft.           Assessment/Plan:    Acute LLE arterial occlusion with history of PAD s/p fem-pop bypass and iliac

## 2025-07-29 NOTE — CARE COORDINATION
Inpatient Rehab referral received. Met with patient to discuss recommendations of rehab and [facility] acute rehab. Patient is open to acute rehab but does adamantly state he will not go to a SNF. Reviewed and explained [facility] Inpatient Rehab program and requirements, including 3 hours of intense therapy daily, anticipated length of stay, weekly team meetings and goal of discharge to home. Advised of needing insurance approval for admission. Discussed potential of short rehab stay pending progress with therapy. Advised date can always be pushed back or moved further all dependent upon overall course. Advised wound care will be done while on rehab to monitor for appropriate healing and that physicians can follow/ monitor while on rehab as well. Patient independent at baseline. Has supportive GF. All questions answered and patient verbalized understanding. Freedom of choice provided and patient requests admit to [facility] pending insurance approval. Patient will need OT eval/ PM&R physician consult for precert.

## 2025-07-29 NOTE — CONSULTS
Palliative Care Consult Note  Patient: Garry Gamez  Gender: male  YOB: 1960  Unit/Bed: IC07/IC07-01  CodeStatus: Full Code  Inpatient Treatment Team: Treatment Team:   Andre Royal MD Garcia, Lleowell, MD Zaizafoun, Manaf, MD Garcia, Lleowell, MD Hipp, Lisa, Amna Quintana, APRN - Amna Wood, APRN - Diego Mondragon RN  Admit Date:  7/23/2025    Chief Complaint: Leg pain    History of Presenting Illness:      Garry Gamez is a 65 y.o. male on hospital day 6 with a history of alcoholism, anxiety, lower extremity arterial occlusion, depression, GERD, hyperlipidemia, hypertension, PVD.    Patient was brought to the emergency room from doctors office due to no pulse palpated in the left lower extremity.  Patient had woke up in the morning with severe left foot pain and was seen by PCP.  Patient is status post femoropopliteal bypass in his left lower extremity from acute femoral artery occlusion in the past.. Patient was admitted for femoral artery occlusion.  Patient underwent left lower extremity angiogram, embolectomy, lysis atherectomy, angioplasty and SFA popliteal and tibial peroneal stent on 7/24.  Patient developed left lower extremity compartment syndrome and had 4 compartment fasciotomy on 7/24.     Palliative care was consulted for goals of care, CODE STATUS discussion, family support, and symptom management.      Patient lives with his girlfriend.  He is normally up independently but has had more difficulty getting around over the last 6 months to a year.  Patient reports he has lost weight over the last year due to not really wanting to eat.    Upon entering room patient is alert and oriented x 4.  Patient reports his pain is well-controlled with Dilaudid.  Patient reports he is eating well and putting on weight this admission.  Patient has many stresses with life and half-way.  He feels he has no money and a lot of medical issues.  Patient reports he

## 2025-07-29 NOTE — PLAN OF CARE
Problem: Discharge Planning  Goal: Discharge to home or other facility with appropriate resources  Outcome: Progressing  Flowsheets  Taken 7/29/2025 1340 by Erika Santiago RN  Discharge to home or other facility with appropriate resources:   Identify barriers to discharge with patient and caregiver   Arrange for needed discharge resources and transportation as appropriate   Identify discharge learning needs (meds, wound care, etc)   Refer to discharge planning if patient needs post-hospital services based on physician order or complex needs related to functional status, cognitive ability or social support system   Arrange for interpreters to assist at discharge as needed  Taken 7/29/2025 0800 by Diego Arnold RN  Discharge to home or other facility with appropriate resources: Identify barriers to discharge with patient and caregiver     Problem: Pain  Goal: Verbalizes/displays adequate comfort level or baseline comfort level  Outcome: Progressing  Flowsheets (Taken 7/29/2025 1325)  Verbalizes/displays adequate comfort level or baseline comfort level:   Encourage patient to monitor pain and request assistance   Assess pain using appropriate pain scale   Administer analgesics based on type and severity of pain and evaluate response   Implement non-pharmacological measures as appropriate and evaluate response   Consider cultural and social influences on pain and pain management   Notify Licensed Independent Practitioner if interventions unsuccessful or patient reports new pain     Problem: Safety - Adult  Goal: Free from fall injury  Outcome: Progressing     Problem: Skin/Tissue Integrity  Goal: Skin integrity remains intact  Description: 1.  Monitor for areas of redness and/or skin breakdown  2.  Assess vascular access sites hourly  3.  Every 4-6 hours minimum:  Change oxygen saturation probe site  4.  Every 4-6 hours:  If on nasal continuous positive airway pressure, respiratory therapy assess nares and determine

## 2025-07-29 NOTE — PROGRESS NOTES
PHARMACY NOTE:   Interdisciplinary Rounds Completed     Changes made today by Pharmacy:   Added Ambien 10 mg nightly prn       Additional information:   Heparin drip   Steroid: None  Insulin coverage: None  Pressors: None  Sedation: None  Antimicrobial therapy: none  Core measures assessed/met    Ajit Marquez PharmD  7/29/2025 10:07 AM

## 2025-07-29 NOTE — PLAN OF CARE
Problem: Discharge Planning  Goal: Discharge to home or other facility with appropriate resources  Outcome: Progressing  Flowsheets  Taken 7/28/2025 2000 by Keyla Zamora RN  Discharge to home or other facility with appropriate resources: Identify barriers to discharge with patient and caregiver  Taken 7/28/2025 0800 by Jana Morales RN  Discharge to home or other facility with appropriate resources: Identify barriers to discharge with patient and caregiver     Problem: Pain  Goal: Verbalizes/displays adequate comfort level or baseline comfort level  Outcome: Progressing  Flowsheets (Taken 7/28/2025 0800 by Jana Morales, RN)  Verbalizes/displays adequate comfort level or baseline comfort level: Encourage patient to monitor pain and request assistance     Problem: Safety - Adult  Goal: Free from fall injury  Outcome: Progressing     Problem: Skin/Tissue Integrity  Goal: Skin integrity remains intact  Description: 1.  Monitor for areas of redness and/or skin breakdown  2.  Assess vascular access sites hourly  3.  Every 4-6 hours minimum:  Change oxygen saturation probe site  4.  Every 4-6 hours:  If on nasal continuous positive airway pressure, respiratory therapy assess nares and determine need for appliance change or resting period  Outcome: Progressing  Flowsheets  Taken 7/28/2025 2000 by Keyla Zamora RN  Skin Integrity Remains Intact:   Monitor for areas of redness and/or skin breakdown   Every 4-6 hours minimum:  Change oxygen saturation probe site  Taken 7/28/2025 0800 by Jana Morales RN  Skin Integrity Remains Intact: Monitor for areas of redness and/or skin breakdown     Problem: Chronic Conditions and Co-morbidities  Goal: Patient's chronic conditions and co-morbidity symptoms are monitored and maintained or improved  Outcome: Progressing  Flowsheets  Taken 7/28/2025 2000 by Keyla Zamora RN  Care Plan - Patient's Chronic Conditions and Co-Morbidity Symptoms are Monitored and Maintained or

## 2025-07-29 NOTE — PROGRESS NOTES
[facility] OCCUPATIONAL THERAPY EVALUATION - ACUTE     NAME: Garry Gamez  : 1960 (65 y.o.)  MRN: [id number]  CODE STATUS: Full Code  Room: [place] [address]    Date of Service: 2025    Patient Diagnosis(es): Femoral artery occlusion, left [I70.202]  Acute occlusion of artery of lower extremity [I70.209]   Patient Active Problem List    Diagnosis Date Noted    Palliative care encounter 2025    Goals of care, counseling/discussion 2025    Advanced care planning/counseling discussion 2025    Depression 2025    Acute occlusion of artery of lower extremity 2025    History of arterial occlusion 2025    Femoral artery occlusion, left 2025    Tobacco use 2024    Dyspnea 2024    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    Aneurysm of iliac artery, right 2024    Spinal stenosis at L4-L5 level 2024    Erectile dysfunction 2024    Transaminitis 2024    Calf ulcer, left, limited to breakdown of skin (HCC) 2024    Atherosclerosis of native artery of left lower extremity with intermittent claudication 10/04/2023    Anxiety state 2023    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) 2023    GERD (gastroesophageal reflux disease) 2023    Substance induced mood disorder (HCC) 2022      Acute occlusion of artery of lower extremity    Past Medical History:   Diagnosis Date    Alcoholism (HCC)     Anxiety     Atherosclerosis of native artery of left lower extremity with intermittent claudication     Depression     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)     GERD (gastroesophageal reflux disease)     Hx of blood clots     Hyperlipidemia     Hypertension     PVD (peripheral vascular disease)     Spinal stenosis at L4-L5 level      Past Surgical History:   Procedure Laterality Date    ABDOMINAL AORTIC ANEURYSM REPAIR, ENDOVASCULAR Right 8/15/2024    COVERED TUBE STENT FOR COMMON AND

## 2025-07-30 LAB
ANTI-XA UNFRAC HEPARIN: 0.38 IU/ML
BASOPHILS # BLD: 0.1 K/UL (ref 0–0.2)
BASOPHILS NFR BLD: 0.8 %
EOSINOPHIL # BLD: 0.8 K/UL (ref 0–0.7)
EOSINOPHIL NFR BLD: 6.2 %
ERYTHROCYTE [DISTWIDTH] IN BLOOD BY AUTOMATED COUNT: 14.6 % (ref 11.5–14.5)
GLUCOSE BLD-MCNC: 184 MG/DL (ref 70–99)
HCT VFR BLD AUTO: 26.2 % (ref 42–52)
HGB BLD-MCNC: 8.9 G/DL (ref 14–18)
LYMPHOCYTES # BLD: 2.4 K/UL (ref 1–4.8)
LYMPHOCYTES NFR BLD: 18.3 %
MCH RBC QN AUTO: 31.1 PG (ref 27–31.3)
MCHC RBC AUTO-ENTMCNC: 34 % (ref 33–37)
MCV RBC AUTO: 91.6 FL (ref 79–92.2)
MONOCYTES # BLD: 1.4 K/UL (ref 0.2–0.8)
MONOCYTES NFR BLD: 10.9 %
NEUTROPHILS # BLD: 8.1 K/UL (ref 1.4–6.5)
NEUTS SEG NFR BLD: 62.4 %
PERFORMED ON: ABNORMAL
PLATELET # BLD AUTO: 229 K/UL (ref 130–400)
RBC # BLD AUTO: 2.86 M/UL (ref 4.7–6.1)
WBC # BLD AUTO: 13 K/UL (ref 4.8–10.8)

## 2025-07-30 PROCEDURE — 36415 COLL VENOUS BLD VENIPUNCTURE: CPT

## 2025-07-30 PROCEDURE — 85520 HEPARIN ASSAY: CPT

## 2025-07-30 PROCEDURE — 6370000000 HC RX 637 (ALT 250 FOR IP): Performed by: STUDENT IN AN ORGANIZED HEALTH CARE EDUCATION/TRAINING PROGRAM

## 2025-07-30 PROCEDURE — 85025 COMPLETE CBC W/AUTO DIFF WBC: CPT

## 2025-07-30 PROCEDURE — 2060000000 HC ICU INTERMEDIATE R&B

## 2025-07-30 PROCEDURE — 97116 GAIT TRAINING THERAPY: CPT

## 2025-07-30 PROCEDURE — 6360000002 HC RX W HCPCS: Performed by: SURGERY

## 2025-07-30 PROCEDURE — 6370000000 HC RX 637 (ALT 250 FOR IP): Performed by: INTERNAL MEDICINE

## 2025-07-30 PROCEDURE — 2500000003 HC RX 250 WO HCPCS: Performed by: SURGERY

## 2025-07-30 PROCEDURE — 97535 SELF CARE MNGMENT TRAINING: CPT

## 2025-07-30 PROCEDURE — 6370000000 HC RX 637 (ALT 250 FOR IP): Performed by: SURGERY

## 2025-07-30 RX ORDER — LOSARTAN POTASSIUM 25 MG/1
25 TABLET ORAL DAILY
Status: CANCELLED | OUTPATIENT
Start: 2025-07-31

## 2025-07-30 RX ORDER — ATORVASTATIN CALCIUM 20 MG/1
20 TABLET, FILM COATED ORAL DAILY
Status: CANCELLED | OUTPATIENT
Start: 2025-07-31

## 2025-07-30 RX ORDER — SODIUM CHLORIDE 0.9 % (FLUSH) 0.9 %
5-40 SYRINGE (ML) INJECTION PRN
Status: CANCELLED | OUTPATIENT
Start: 2025-07-30

## 2025-07-30 RX ORDER — OXYCODONE HYDROCHLORIDE 5 MG/1
5 TABLET ORAL EVERY 4 HOURS PRN
Refills: 0 | Status: CANCELLED | OUTPATIENT
Start: 2025-07-30

## 2025-07-30 RX ORDER — METOPROLOL TARTRATE 50 MG
50 TABLET ORAL 2 TIMES DAILY
Status: CANCELLED | OUTPATIENT
Start: 2025-07-30

## 2025-07-30 RX ORDER — SODIUM CHLORIDE 0.9 % (FLUSH) 0.9 %
5-40 SYRINGE (ML) INJECTION EVERY 12 HOURS SCHEDULED
Status: CANCELLED | OUTPATIENT
Start: 2025-07-30

## 2025-07-30 RX ORDER — PANTOPRAZOLE SODIUM 40 MG/1
40 TABLET, DELAYED RELEASE ORAL
Status: CANCELLED | OUTPATIENT
Start: 2025-07-31

## 2025-07-30 RX ORDER — ZOLPIDEM TARTRATE 5 MG/1
10 TABLET ORAL NIGHTLY PRN
Status: CANCELLED | OUTPATIENT
Start: 2025-07-30

## 2025-07-30 RX ORDER — ONDANSETRON 2 MG/ML
4 INJECTION INTRAMUSCULAR; INTRAVENOUS EVERY 6 HOURS PRN
Status: CANCELLED | OUTPATIENT
Start: 2025-07-30

## 2025-07-30 RX ORDER — POLYETHYLENE GLYCOL 3350 17 G/17G
17 POWDER, FOR SOLUTION ORAL DAILY PRN
Status: CANCELLED | OUTPATIENT
Start: 2025-07-30

## 2025-07-30 RX ORDER — POTASSIUM CHLORIDE 1500 MG/1
40 TABLET, EXTENDED RELEASE ORAL PRN
Status: CANCELLED | OUTPATIENT
Start: 2025-07-30

## 2025-07-30 RX ORDER — ASPIRIN 81 MG/1
81 TABLET ORAL DAILY
Status: CANCELLED | OUTPATIENT
Start: 2025-07-31

## 2025-07-30 RX ORDER — ACETAMINOPHEN 650 MG/1
650 SUPPOSITORY RECTAL EVERY 6 HOURS PRN
Status: CANCELLED | OUTPATIENT
Start: 2025-07-30

## 2025-07-30 RX ORDER — POTASSIUM CHLORIDE 7.45 MG/ML
10 INJECTION INTRAVENOUS PRN
Status: CANCELLED | OUTPATIENT
Start: 2025-07-30

## 2025-07-30 RX ORDER — AMLODIPINE BESYLATE 5 MG/1
5 TABLET ORAL DAILY
Status: CANCELLED | OUTPATIENT
Start: 2025-07-31

## 2025-07-30 RX ORDER — ACETAMINOPHEN 325 MG/1
650 TABLET ORAL EVERY 6 HOURS PRN
Status: CANCELLED | OUTPATIENT
Start: 2025-07-30

## 2025-07-30 RX ORDER — MAGNESIUM SULFATE IN WATER 40 MG/ML
2000 INJECTION, SOLUTION INTRAVENOUS PRN
Status: CANCELLED | OUTPATIENT
Start: 2025-07-30

## 2025-07-30 RX ORDER — ENOXAPARIN SODIUM 100 MG/ML
1 INJECTION SUBCUTANEOUS 2 TIMES DAILY
Status: DISCONTINUED | OUTPATIENT
Start: 2025-07-30 | End: 2025-07-31 | Stop reason: HOSPADM

## 2025-07-30 RX ORDER — HEPARIN SODIUM 10000 [USP'U]/100ML
5-30 INJECTION, SOLUTION INTRAVENOUS CONTINUOUS
Status: CANCELLED | OUTPATIENT
Start: 2025-07-30

## 2025-07-30 RX ORDER — ONDANSETRON 4 MG/1
4 TABLET, ORALLY DISINTEGRATING ORAL EVERY 8 HOURS PRN
Status: CANCELLED | OUTPATIENT
Start: 2025-07-30

## 2025-07-30 RX ORDER — OXYCODONE HYDROCHLORIDE 5 MG/1
10 TABLET ORAL EVERY 4 HOURS PRN
Refills: 0 | Status: CANCELLED | OUTPATIENT
Start: 2025-07-30

## 2025-07-30 RX ORDER — SODIUM CHLORIDE 9 MG/ML
INJECTION, SOLUTION INTRAVENOUS PRN
Status: CANCELLED | OUTPATIENT
Start: 2025-07-30

## 2025-07-30 RX ORDER — DOCUSATE SODIUM 100 MG/1
100 CAPSULE, LIQUID FILLED ORAL 2 TIMES DAILY
Status: CANCELLED | OUTPATIENT
Start: 2025-07-30

## 2025-07-30 RX ORDER — CLOPIDOGREL BISULFATE 75 MG/1
75 TABLET ORAL DAILY
Status: CANCELLED | OUTPATIENT
Start: 2025-07-31

## 2025-07-30 RX ORDER — GABAPENTIN 400 MG/1
400 CAPSULE ORAL 3 TIMES DAILY
Status: CANCELLED | OUTPATIENT
Start: 2025-07-30

## 2025-07-30 RX ORDER — NICOTINE 21 MG/24HR
1 PATCH, TRANSDERMAL 24 HOURS TRANSDERMAL DAILY
Status: CANCELLED | OUTPATIENT
Start: 2025-07-31

## 2025-07-30 RX ADMIN — LOSARTAN POTASSIUM 25 MG: 25 TABLET, FILM COATED ORAL at 08:05

## 2025-07-30 RX ADMIN — ENOXAPARIN SODIUM 70 MG: 100 INJECTION SUBCUTANEOUS at 20:42

## 2025-07-30 RX ADMIN — GABAPENTIN 400 MG: 400 CAPSULE ORAL at 08:05

## 2025-07-30 RX ADMIN — GABAPENTIN 400 MG: 400 CAPSULE ORAL at 20:42

## 2025-07-30 RX ADMIN — HEPARIN SODIUM 20.6 UNITS/KG/HR: 10000 INJECTION, SOLUTION INTRAVENOUS at 01:17

## 2025-07-30 RX ADMIN — METOPROLOL TARTRATE 50 MG: 50 TABLET, FILM COATED ORAL at 20:42

## 2025-07-30 RX ADMIN — DOCUSATE SODIUM 100 MG: 100 CAPSULE, LIQUID FILLED ORAL at 08:08

## 2025-07-30 RX ADMIN — GABAPENTIN 400 MG: 400 CAPSULE ORAL at 13:54

## 2025-07-30 RX ADMIN — ENOXAPARIN SODIUM 70 MG: 100 INJECTION SUBCUTANEOUS at 10:58

## 2025-07-30 RX ADMIN — Medication 10 ML: at 08:06

## 2025-07-30 RX ADMIN — ATORVASTATIN CALCIUM 20 MG: 20 TABLET, FILM COATED ORAL at 08:05

## 2025-07-30 RX ADMIN — CLOPIDOGREL BISULFATE 75 MG: 75 TABLET, FILM COATED ORAL at 08:05

## 2025-07-30 RX ADMIN — AMLODIPINE BESYLATE 5 MG: 5 TABLET ORAL at 08:05

## 2025-07-30 RX ADMIN — Medication 10 ML: at 20:43

## 2025-07-30 RX ADMIN — OXYCODONE 10 MG: 5 TABLET ORAL at 20:41

## 2025-07-30 RX ADMIN — METOPROLOL TARTRATE 50 MG: 50 TABLET, FILM COATED ORAL at 08:05

## 2025-07-30 RX ADMIN — ASPIRIN 81 MG: 81 TABLET, DELAYED RELEASE ORAL at 08:05

## 2025-07-30 RX ADMIN — OXYCODONE 10 MG: 5 TABLET ORAL at 16:07

## 2025-07-30 RX ADMIN — PANTOPRAZOLE SODIUM 40 MG: 40 TABLET, DELAYED RELEASE ORAL at 08:05

## 2025-07-30 RX ADMIN — SERTRALINE 50 MG: 50 TABLET, FILM COATED ORAL at 08:05

## 2025-07-30 ASSESSMENT — PAIN DESCRIPTION - LOCATION: LOCATION: LEG

## 2025-07-30 ASSESSMENT — PAIN SCALES - GENERAL
PAINLEVEL_OUTOF10: 8
PAINLEVEL_OUTOF10: 8

## 2025-07-30 ASSESSMENT — PAIN DESCRIPTION - DESCRIPTORS: DESCRIPTORS: ACHING

## 2025-07-30 NOTE — CONSULTS
Physical Medicine & Rehabilitation  Consult Note      Admitting Physician: Andre Royal MD    Primary Care Provider: Lizandro Ruffin MD     Reason for Consult:  Asses rehab needs, promote physical and mental function, analyze level of care to determine rehab needs, improve ability to actively participate in the rehabilitation process, and decrease likelihood of re-admit to the hospital after discharge.      History of Present Illness:    Garry Gamez is a 65 y.o. male admitted to [facility] on 7/23/2025.              HPI  Reason for Hospitalization:   1. Acute occlusion of artery of lower extremity            Chief Complaint   Patient presents with    Leg Pain       Pt was sent to the ER by his doctors office for no pulse in his LLE,       Isolation:No active isolations     Hospital Course:  Admit Date: 7/23/2025  4:06 PM  Inpatient Rehab Referral Date: 7/29/2025  Narrative of hospital course/history of present illness: 65 y.o. male patient sent to ER 7/23 d/t initially presenting to PCP office with complaints of left foot pain and mobility issues. C/f occlusion as patient with cool temperature and absent pulses. CTA confirming occlusion to LLE, does also show occluded right posterior tibial artery. Patient admitted and to OR 7/24 with vascular for fem-pop bypass. Care c/b compartment syndrome requiring requiring return to OR for fasciotomy. Additional complexity of Afib RVR, HTN but then periods of hypotension s/p starting beta- blocker. Patient on heparin gtt.     Internal Medicine:  Acute LLE arterial occlusion with history of PAD s/p fem-pop bypass and iliac stents  LLE compartment syndrome s/p fasciectomy   - vascular surgery following, s/p vascular surgery interventions on 7/24, see op notes for details  - continue ICU level care for close hemodynamic monitoring and frequent neurovascular checks, critical care following  - continue heparin gtt, continue DAPT and statin; has pulses

## 2025-07-30 NOTE — DISCHARGE SUMMARY
Hospital Medicine Discharge Summary    Garry Gamez  :  1960  MRN:  [id number]    Admit date:  2025  Discharge date:  2025    Admitting Physician:  Deidre Callejas MD  Primary Care Physician:  Lizandro Ruffin MD      Discharge Diagnoses:    Acute LLE arterial occlusion with history of PAD s/p fem-pop bypass and iliac stents    Chief Complaint   Patient presents with    Leg Pain     Pt was sent to the ER by his doctors office for no pulse in his LLE,      Hospital Course:   Patient presented with LLE compartment syndrome seconary to acute LLE arterial occlusion.  Underwent fasciectomy and interventions by vascular surgery.  He is on a heparin drip still and plan is for acute rehab    Exam on discharge:   /72   Pulse 61   Temp 98.4 °F (36.9 °C) (Oral)   Resp 18   Ht 1.778 m (5' 10\")   Wt 66.2 kg (145 lb 14.4 oz)   SpO2 100%   BMI 20.93 kg/m²   General appearance: No apparent distress, appears stated age and cooperative.  HEENT:  Conjunctivae/corneas clear.  Neck: Trachea midline.  Respiratory:  Normal respiratory effort. Clear to auscultation  Cardiovascular: Regular rate and rhythm  Abdomen: Soft, non-tender, non-distended with normal bowel sounds.  Musculoskeletal: wrapped LE  Neuro: Non Focal.     Patient was seen by the following consultants   Consults:  IP CONSULT TO SPIRITUAL SERVICES  IP CONSULT TO CRITICAL CARE  IP CONSULT TO VASCULAR SURGERY  IP CONSULT TO HOME CARE NEEDS  IP CONSULT TO PALLIATIVE CARE  IP CONSULT TO REHAB/TCU ADMISSION COORDINATOR  IP CONSULT TO HOME CARE NEEDS  IP CONSULT TO REHAB/TCU ADMISSION COORDINATOR    Significant Diagnostic Studies:    Refer to chart     Please refer to chart if no studies are shown here    FL LESS THAN 1 HOUR  Result Date: 2025  See operative report     FL LESS THAN 1 HOUR  Result Date: 2025  See operative report     CTA ABDOMINAL AORTA W BILAT RUNOFF W WO CONTRAST  Result Date: 2025  EXAMINATION: CTA OF THE  AORTA WITH LOWER EXTREMITY RUNOFF 7/23/2025 5:42 pm TECHNIQUE: CTA of the pelvis and bilateral lower extremities was performed after the administration of intravenous contrast.   Multiplanar reformatted images are provided for review.  MIP images are provided for review. Automated exposure control, iterative reconstruction, and/or weight based adjustment of the mA/kV was utilized to reduce the radiation dose to as low as reasonably achievable. COMPARISON: None. HISTORY: ORDERING SYSTEM PROVIDED HISTORY: left foot claudication TECHNOLOGIST PROVIDED HISTORY: Reason for exam:->left foot claudication Additional Contrast?->1 What reading provider will be dictating this exam?->CRC FINDINGS: Aorta: There is significant calcified and noncalcified atherosclerotic plaque associated with the abdominal aorta.  The abdominal aorta is ectatic measuring up to 2.7 cm in greatest dimension.  No significant stenosis involving SMA E or celiac trunk.  No stenosis involving the renal arteries. Right lower extremity: There is a stent located in the right common and external iliac arteries. This stent is patent.  There is mild stenosis involving right common iliac artery proximal to the stent.  Right external iliac arteries patent without evidence of stenosis.  Right common femoral artery is patent.  Profundal is patent.  Right superficial femoral artery is patent without evidence of stenosis.  Right popliteal artery is patent.  Right posterior tibial artery is occluded.  Right anterior tibial artery is patent.  Patent right dorsalis pedis artery. Left lower extremity: Left common iliac artery and external iliac arteries are patent without evidence of stenosis.  Left common femoral artery is patent.  Profundal is patent.  Left femoral popliteal bypass graft is occluded.  Left popliteal artery is occluded.  Left anterior and posterior tibial arteries are occluded.  No blood flow seen at level of the [place] left dorsalis pedis artery. No

## 2025-07-30 NOTE — CARE COORDINATION
Call placed to [facility] to check status of IRF prior auth and representative confirms request received and is pending at this time. She reports turn around time is tomorrow 7/31. Pending reference# [id number]. Will continue to follow for insurance decision.  Electronically signed by Gaby Bradshaw on 7/30/2025 at 2:32 PM

## 2025-07-30 NOTE — PROGRESS NOTES
Progress Note  Patient: [facility]  Unit/Bed: W194/W194-01  YOB: 1960  MRN: [id number]  Acct: [id number]   Admitting Diagnosis: Femoral artery occlusion, left [I70.202]  Acute occlusion of artery of lower extremity [I70.209]  Date:  7/23/2025  Hospital Day: 7      Subjective  Patient denies pain in the left foot.      Review of Systems:   Review of Systems  Afebrile    Physical Examination:    /72   Pulse 61   Temp 98.4 °F (36.9 °C) (Oral)   Resp 18   Ht 1.778 m (5' 10\")   Wt 66.2 kg (145 lb 14.4 oz)   SpO2 100%   BMI 20.93 kg/m²    Physical Exam  Alert and orient x 3  Cardiovascular regular rate and rhythm rhythm  Lungs lungs clear to auscultation bilaterally  Right groin soft no hematoma noted.  Chronic ecchymosis noted of the skin.  Left leg compartments are soft.  Left PT 2+ left peroneal triphasic left anterior tibial multiphasic  Left foot is warm and appears well-perfused skin however remains mottled    LABS:  CBC:   Lab Results   Component Value Date/Time    WBC 13.0 07/30/2025 05:50 AM    RBC 2.86 07/30/2025 05:50 AM    HGB 8.9 07/30/2025 05:50 AM    HCT 26.2 07/30/2025 05:50 AM    MCV 91.6 07/30/2025 05:50 AM    MCH 31.1 07/30/2025 05:50 AM    MCHC 34.0 07/30/2025 05:50 AM    RDW 14.6 07/30/2025 05:50 AM     07/30/2025 05:50 AM     CBC with Differential:   Lab Results   Component Value Date/Time    WBC 13.0 07/30/2025 05:50 AM    RBC 2.86 07/30/2025 05:50 AM    HGB 8.9 07/30/2025 05:50 AM    HCT 26.2 07/30/2025 05:50 AM     07/30/2025 05:50 AM    MCV 91.6 07/30/2025 05:50 AM    MCH 31.1 07/30/2025 05:50 AM    MCHC 34.0 07/30/2025 05:50 AM    RDW 14.6 07/30/2025 05:50 AM    LYMPHOPCT 18.3 07/30/2025 05:50 AM    MONOPCT 10.9 07/30/2025 05:50 AM    EOSPCT 6.2 07/30/2025 05:50 AM    BASOPCT 0.8 07/30/2025 05:50 AM    MONOSABS 1.4 07/30/2025 05:50 AM    LYMPHSABS 2.4 07/30/2025 05:50 AM    EOSABS 0.8 07/30/2025 05:50 AM    BASOSABS 0.1 07/30/2025 05:50 AM     CMP:

## 2025-07-30 NOTE — PROGRESS NOTES
Physical Therapy Med Surg Daily Treatment Note  Facility/Department: [address] TELEMETRY  Room: [place] [address]       NAME: Garry Gamez  : 1960 (65 y.o.)  MRN: [id number]  CODE STATUS: Full Code    Date of Service: 2025    Patient Diagnosis(es): Femoral artery occlusion, left [I70.202]  Acute occlusion of artery of lower extremity [I70.209]   Chief Complaint   Patient presents with    Leg Pain     Pt was sent to the ER by his doctors office for no pulse in his LLE,      Patient Active Problem List    Diagnosis Date Noted    Palliative care encounter 2025    Goals of care, counseling/discussion 2025    Advanced care planning/counseling discussion 2025    Depression 2025    Acute occlusion of artery of lower extremity 2025    History of arterial occlusion 2025    Femoral artery occlusion, left 2025    Tobacco use 2024    Dyspnea 2024    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    Aneurysm of iliac artery, right 2024    Spinal stenosis at L4-L5 level 2024    Erectile dysfunction 2024    Transaminitis 2024    Calf ulcer, left, limited to breakdown of skin (HCC) 2024    Atherosclerosis of native artery of left lower extremity with intermittent claudication 10/04/2023    Anxiety state 2023    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) 2023    GERD (gastroesophageal reflux disease) 2023    Substance induced mood disorder (HCC) 2022        Past Medical History:   Diagnosis Date    Alcoholism (HCC)     Anxiety     Atherosclerosis of native artery of left lower extremity with intermittent claudication     Depression     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)     GERD (gastroesophageal reflux disease)     Hx of blood clots     Hyperlipidemia     Hypertension     PVD (peripheral vascular disease)     Spinal stenosis at L4-L5 level      Past Surgical History:

## 2025-07-30 NOTE — DISCHARGE INSTR - COC
Continuity of Care Form    Patient Name: Garry Gamez   :  1960  MRN:  [id number]    Admit date:  2025  Discharge date:  ***    Code Status Order: Full Code   Advance Directives:    Date/Time Healthcare Directive Type of Healthcare Directive Copy in Chart Healthcare Agent Appointed Healthcare Agent's Name Healthcare Agent's Phone Number    [phone] No, patient does not have an advance directive for healthcare treatment  --  --  --  --  --             Admitting Physician:  Deidre Callejas MD  PCP: Lizandro Ruffin MD    Discharging Nurse: ***  Discharging Hospital Unit/Room#: W194/W194-01  Discharging Unit Phone Number: ***    Emergency Contact:   Extended Emergency Contact Information  Primary Emergency Contact: Katy Mtz  Home Phone: [phone]  Relation: Girlfriend  Secondary Emergency Contact: Adriana Covarrubias  [employer] Phone: [phone]  Relation: Child    Past Surgical History:  Past Surgical History:   Procedure Laterality Date    ABDOMINAL AORTIC ANEURYSM REPAIR, ENDOVASCULAR Right 8/15/2024    COVERED TUBE STENT FOR COMMON AND EXTERNAL ILIAC ARTERIES RIGHT. performed by Naomi Gallardo MD at [facility]    FEMORAL BYPASS Left 2024    LEFT FEMORAL TO POPLITEAL BYPASS WITH VEIN VS. SYNTHETIC BYPASS GRAPH  T&S ON ARRIVAL AND ALL OTHER LABS & H&P IN SYSTEM  C-ARM performed by Naomi Gallardo MD at [facility]    TONSILLECTOMY      VASCULAR SURGERY Right 2024    RIGHT INTERNAL ILIAC ARTERY COILING, RIGHT GROIN APPROACH. performed by Naomi Gallardo MD at [facility]    VASCULAR SURGERY Left 2025    LEFT LEG FOR COMPARTMENT SYNDROME FASCEOTOMY.ANGIOGRAM LEFT LEG. performed by Naomi Gallardo MD at [facility]    VASCULAR SURGERY Left 2025    LEFT LOWER EXTREMITY ANGIOGRAM, LEFT LOWER  EXTREMITY EMBOLECTOMY; LYSIS ATHERECTOMY,  ANGIOPLASTY, AND  SFA popliteal and tibial peroneal STENT performed by Naomi Gallardo MD at [facility]       Immunization History:   Immunization

## 2025-07-30 NOTE — PROGRESS NOTES
Spiritual Health History and Assessment/Progress Note  [facility] [place]    Initial Encounter,  , Adjustment to illness, Life Adjustments,      Name: Garry Gamez MRN: [id number]    Age: 65 y.o.     Sex: male   Language: English   Hinduism: None   Femoral artery occlusion, left     Date: 7/30/2025            Total Time Calculated: 30 min              Spiritual Assessment began in [facility]        Referral/Consult From: Rounding   Encounter Overview/Reason: Initial Encounter  Service Provided For: Patient    The pt was reclining on the bed, the pt was alone but very open to having the  visit. The pt expressed his anxieties, his concerns for his son, his concerns for his health, and his desire to get well. The pt also stated, he did not want to go to a nursing home/Rehab. He stated he would like to do his rehab at [facility]. The pt shared his Restorationist susan and how he gave his life to the Lord. The pt requested for prayers for his son and self.      services will remain available for the pt.     Susan, Belief, Meaning:   Patient identifies as spiritual, is connected with a susan tradition or spiritual practice, and has beliefs or practices that help with coping during difficult times  Family/Friends No family/friends present      Importance and Influence:  Patient has spiritual/personal beliefs that influence decisions regarding their health  Family/Friends No family/friends present    Community:  Patient is connected with a spiritual community and feels well-supported. Support system includes: Children, Susan Community, Friends, and Extended family  Family/Friends No family/friends present    Assessment and Plan of Care:     Patient Interventions include: Facilitated expression of thoughts and feelings, Explored spiritual coping/struggle/distress, Engaged in theological reflection, and Affirmed coping skills/support systems  Family/Friends Interventions include: No

## 2025-07-30 NOTE — CARE COORDINATION
Plan remains [facility] when insurance approval is received. Pre cert was started yesterday (7/29) and is still pending at this time.

## 2025-07-31 ENCOUNTER — HOSPITAL ENCOUNTER (INPATIENT)
Age: 65
LOS: 8 days | Discharge: HOME HEALTH | End: 2025-08-08
Attending: PHYSICAL MEDICINE & REHABILITATION | Admitting: PHYSICAL MEDICINE & REHABILITATION
Payer: COMMERCIAL

## 2025-07-31 VITALS
OXYGEN SATURATION: 100 % | TEMPERATURE: 97.7 F | BODY MASS INDEX: 21.56 KG/M2 | WEIGHT: 150.57 LBS | HEART RATE: 109 BPM | HEIGHT: 70 IN | SYSTOLIC BLOOD PRESSURE: 110 MMHG | DIASTOLIC BLOOD PRESSURE: 82 MMHG | RESPIRATION RATE: 19 BRPM

## 2025-07-31 DIAGNOSIS — I70.202 FEMORAL ARTERY OCCLUSION, LEFT: ICD-10-CM

## 2025-07-31 DIAGNOSIS — Z78.9 IMPAIRED MOBILITY AND ACTIVITIES OF DAILY LIVING: ICD-10-CM

## 2025-07-31 DIAGNOSIS — I99.8 ISCHEMIA OF FOOT: Primary | ICD-10-CM

## 2025-07-31 DIAGNOSIS — G89.18 POST-OP PAIN: ICD-10-CM

## 2025-07-31 DIAGNOSIS — Z74.09 IMPAIRED MOBILITY AND ACTIVITIES OF DAILY LIVING: ICD-10-CM

## 2025-07-31 LAB
BASOPHILS # BLD: 0.1 K/UL (ref 0–0.2)
BASOPHILS NFR BLD: 0.9 %
EOSINOPHIL # BLD: 0.6 K/UL (ref 0–0.7)
EOSINOPHIL NFR BLD: 4.8 %
ERYTHROCYTE [DISTWIDTH] IN BLOOD BY AUTOMATED COUNT: 14.9 % (ref 11.5–14.5)
GLUCOSE BLD-MCNC: 116 MG/DL (ref 70–99)
GLUCOSE BLD-MCNC: 133 MG/DL (ref 70–99)
GLUCOSE BLD-MCNC: 188 MG/DL (ref 70–99)
HCT VFR BLD AUTO: 26.7 % (ref 42–52)
HGB BLD-MCNC: 9.1 G/DL (ref 14–18)
LYMPHOCYTES # BLD: 2.1 K/UL (ref 1–4.8)
LYMPHOCYTES NFR BLD: 16.3 %
MCH RBC QN AUTO: 31.1 PG (ref 27–31.3)
MCHC RBC AUTO-ENTMCNC: 34.1 % (ref 33–37)
MCV RBC AUTO: 91.1 FL (ref 79–92.2)
MONOCYTES # BLD: 1.4 K/UL (ref 0.2–0.8)
MONOCYTES NFR BLD: 10.6 %
NEUTROPHILS # BLD: 8.4 K/UL (ref 1.4–6.5)
NEUTS SEG NFR BLD: 65.5 %
PERFORMED ON: ABNORMAL
PLATELET # BLD AUTO: 273 K/UL (ref 130–400)
RBC # BLD AUTO: 2.93 M/UL (ref 4.7–6.1)
WBC # BLD AUTO: 12.9 K/UL (ref 4.8–10.8)

## 2025-07-31 PROCEDURE — 6370000000 HC RX 637 (ALT 250 FOR IP): Performed by: STUDENT IN AN ORGANIZED HEALTH CARE EDUCATION/TRAINING PROGRAM

## 2025-07-31 PROCEDURE — 97116 GAIT TRAINING THERAPY: CPT

## 2025-07-31 PROCEDURE — 6370000000 HC RX 637 (ALT 250 FOR IP): Performed by: INTERNAL MEDICINE

## 2025-07-31 PROCEDURE — 6370000000 HC RX 637 (ALT 250 FOR IP): Performed by: SURGERY

## 2025-07-31 PROCEDURE — 36415 COLL VENOUS BLD VENIPUNCTURE: CPT

## 2025-07-31 PROCEDURE — 6360000002 HC RX W HCPCS: Performed by: SURGERY

## 2025-07-31 PROCEDURE — 99232 SBSQ HOSP IP/OBS MODERATE 35: CPT | Performed by: INTERNAL MEDICINE

## 2025-07-31 PROCEDURE — 1180000000 HC REHAB R&B

## 2025-07-31 PROCEDURE — 97110 THERAPEUTIC EXERCISES: CPT

## 2025-07-31 PROCEDURE — 2500000003 HC RX 250 WO HCPCS: Performed by: SURGERY

## 2025-07-31 PROCEDURE — 6360000002 HC RX W HCPCS: Performed by: INTERNAL MEDICINE

## 2025-07-31 PROCEDURE — 85025 COMPLETE CBC W/AUTO DIFF WBC: CPT

## 2025-07-31 PROCEDURE — 2500000003 HC RX 250 WO HCPCS: Performed by: INTERNAL MEDICINE

## 2025-07-31 PROCEDURE — 2580000003 HC RX 258: Performed by: INTERNAL MEDICINE

## 2025-07-31 RX ORDER — POTASSIUM CHLORIDE 7.45 MG/ML
10 INJECTION INTRAVENOUS PRN
Status: DISCONTINUED | OUTPATIENT
Start: 2025-07-31 | End: 2025-08-01

## 2025-07-31 RX ORDER — LOSARTAN POTASSIUM 25 MG/1
12.5 TABLET ORAL DAILY
Status: DISCONTINUED | OUTPATIENT
Start: 2025-08-01 | End: 2025-08-08 | Stop reason: HOSPADM

## 2025-07-31 RX ORDER — ONDANSETRON 4 MG/1
4 TABLET, ORALLY DISINTEGRATING ORAL EVERY 8 HOURS PRN
Status: DISCONTINUED | OUTPATIENT
Start: 2025-07-31 | End: 2025-08-08 | Stop reason: HOSPADM

## 2025-07-31 RX ORDER — OXYCODONE HYDROCHLORIDE 5 MG/1
5 TABLET ORAL EVERY 4 HOURS PRN
Refills: 0 | Status: DISCONTINUED | OUTPATIENT
Start: 2025-07-31 | End: 2025-08-08 | Stop reason: HOSPADM

## 2025-07-31 RX ORDER — METOPROLOL TARTRATE 25 MG/1
12.5 TABLET, FILM COATED ORAL 2 TIMES DAILY
Status: DISCONTINUED | OUTPATIENT
Start: 2025-07-31 | End: 2025-07-31 | Stop reason: HOSPADM

## 2025-07-31 RX ORDER — OXYCODONE HYDROCHLORIDE 5 MG/1
10 TABLET ORAL EVERY 4 HOURS PRN
Refills: 0 | Status: DISCONTINUED | OUTPATIENT
Start: 2025-07-31 | End: 2025-08-08 | Stop reason: HOSPADM

## 2025-07-31 RX ORDER — METOPROLOL TARTRATE 25 MG/1
12.5 TABLET, FILM COATED ORAL 2 TIMES DAILY
Status: DISCONTINUED | OUTPATIENT
Start: 2025-07-31 | End: 2025-08-08 | Stop reason: HOSPADM

## 2025-07-31 RX ORDER — ENOXAPARIN SODIUM 100 MG/ML
1 INJECTION SUBCUTANEOUS 2 TIMES DAILY
Status: CANCELLED | OUTPATIENT
Start: 2025-07-31

## 2025-07-31 RX ORDER — ALBUTEROL SULFATE 90 UG/1
2 INHALANT RESPIRATORY (INHALATION) EVERY 6 HOURS PRN
Status: DISCONTINUED | OUTPATIENT
Start: 2025-07-31 | End: 2025-08-08 | Stop reason: HOSPADM

## 2025-07-31 RX ORDER — ASPIRIN 81 MG/1
81 TABLET ORAL DAILY
Status: DISCONTINUED | OUTPATIENT
Start: 2025-07-31 | End: 2025-08-08 | Stop reason: HOSPADM

## 2025-07-31 RX ORDER — LOSARTAN POTASSIUM 25 MG/1
12.5 TABLET ORAL DAILY
Status: DISCONTINUED | OUTPATIENT
Start: 2025-08-01 | End: 2025-07-31 | Stop reason: HOSPADM

## 2025-07-31 RX ORDER — MAGNESIUM SULFATE IN WATER 40 MG/ML
2000 INJECTION, SOLUTION INTRAVENOUS PRN
Status: DISCONTINUED | OUTPATIENT
Start: 2025-07-31 | End: 2025-08-01

## 2025-07-31 RX ORDER — DOCUSATE SODIUM 100 MG/1
100 CAPSULE, LIQUID FILLED ORAL 2 TIMES DAILY
Status: DISCONTINUED | OUTPATIENT
Start: 2025-07-31 | End: 2025-08-08 | Stop reason: HOSPADM

## 2025-07-31 RX ORDER — GABAPENTIN 400 MG/1
400 CAPSULE ORAL 3 TIMES DAILY
Status: DISCONTINUED | OUTPATIENT
Start: 2025-07-31 | End: 2025-08-08 | Stop reason: HOSPADM

## 2025-07-31 RX ORDER — NICOTINE 21 MG/24HR
1 PATCH, TRANSDERMAL 24 HOURS TRANSDERMAL DAILY
Status: DISCONTINUED | OUTPATIENT
Start: 2025-07-31 | End: 2025-08-08 | Stop reason: HOSPADM

## 2025-07-31 RX ORDER — SODIUM CHLORIDE, SODIUM LACTATE, POTASSIUM CHLORIDE, AND CALCIUM CHLORIDE .6; .31; .03; .02 G/100ML; G/100ML; G/100ML; G/100ML
500 INJECTION, SOLUTION INTRAVENOUS ONCE
Status: COMPLETED | OUTPATIENT
Start: 2025-07-31 | End: 2025-07-31

## 2025-07-31 RX ORDER — PANTOPRAZOLE SODIUM 40 MG/1
40 TABLET, DELAYED RELEASE ORAL
Status: DISCONTINUED | OUTPATIENT
Start: 2025-08-01 | End: 2025-08-08 | Stop reason: HOSPADM

## 2025-07-31 RX ORDER — SODIUM PHOSPHATE, DIBASIC AND SODIUM PHOSPHATE, MONOBASIC 7; 19 G/230ML; G/230ML
1 ENEMA RECTAL DAILY PRN
Status: DISCONTINUED | OUTPATIENT
Start: 2025-07-31 | End: 2025-08-08 | Stop reason: HOSPADM

## 2025-07-31 RX ORDER — ZOLPIDEM TARTRATE 5 MG/1
10 TABLET ORAL NIGHTLY PRN
Status: DISCONTINUED | OUTPATIENT
Start: 2025-07-31 | End: 2025-08-08 | Stop reason: HOSPADM

## 2025-07-31 RX ORDER — METOPROLOL TARTRATE 25 MG/1
25 TABLET, FILM COATED ORAL 2 TIMES DAILY
Status: DISCONTINUED | OUTPATIENT
Start: 2025-07-31 | End: 2025-07-31

## 2025-07-31 RX ORDER — ATORVASTATIN CALCIUM 20 MG/1
20 TABLET, FILM COATED ORAL DAILY
Status: DISCONTINUED | OUTPATIENT
Start: 2025-07-31 | End: 2025-08-08 | Stop reason: HOSPADM

## 2025-07-31 RX ORDER — BISACODYL 10 MG
10 SUPPOSITORY, RECTAL RECTAL DAILY PRN
Status: DISCONTINUED | OUTPATIENT
Start: 2025-07-31 | End: 2025-08-08 | Stop reason: HOSPADM

## 2025-07-31 RX ORDER — ONDANSETRON 2 MG/ML
4 INJECTION INTRAMUSCULAR; INTRAVENOUS EVERY 6 HOURS PRN
Status: DISCONTINUED | OUTPATIENT
Start: 2025-07-31 | End: 2025-08-08 | Stop reason: HOSPADM

## 2025-07-31 RX ORDER — ENOXAPARIN SODIUM 100 MG/ML
1 INJECTION SUBCUTANEOUS 2 TIMES DAILY
Status: DISCONTINUED | OUTPATIENT
Start: 2025-07-31 | End: 2025-08-08 | Stop reason: HOSPADM

## 2025-07-31 RX ORDER — SODIUM CHLORIDE 0.9 % (FLUSH) 0.9 %
5-40 SYRINGE (ML) INJECTION PRN
Status: DISCONTINUED | OUTPATIENT
Start: 2025-07-31 | End: 2025-08-08 | Stop reason: HOSPADM

## 2025-07-31 RX ORDER — ACETAMINOPHEN 650 MG/1
650 SUPPOSITORY RECTAL EVERY 6 HOURS PRN
Status: DISCONTINUED | OUTPATIENT
Start: 2025-07-31 | End: 2025-08-06

## 2025-07-31 RX ORDER — SODIUM CHLORIDE 0.9 % (FLUSH) 0.9 %
5-40 SYRINGE (ML) INJECTION EVERY 12 HOURS SCHEDULED
Status: DISCONTINUED | OUTPATIENT
Start: 2025-07-31 | End: 2025-08-01

## 2025-07-31 RX ORDER — METOPROLOL TARTRATE 25 MG/1
12.5 TABLET, FILM COATED ORAL 2 TIMES DAILY
Status: CANCELLED | OUTPATIENT
Start: 2025-07-31

## 2025-07-31 RX ORDER — ACETAMINOPHEN 325 MG/1
650 TABLET ORAL EVERY 6 HOURS PRN
Status: DISCONTINUED | OUTPATIENT
Start: 2025-07-31 | End: 2025-08-08 | Stop reason: HOSPADM

## 2025-07-31 RX ORDER — POLYETHYLENE GLYCOL 3350 17 G/17G
17 POWDER, FOR SOLUTION ORAL DAILY PRN
Status: DISCONTINUED | OUTPATIENT
Start: 2025-07-31 | End: 2025-08-08 | Stop reason: HOSPADM

## 2025-07-31 RX ORDER — LOSARTAN POTASSIUM 25 MG/1
12.5 TABLET ORAL DAILY
Status: CANCELLED | OUTPATIENT
Start: 2025-08-01

## 2025-07-31 RX ORDER — SODIUM CHLORIDE 9 MG/ML
INJECTION, SOLUTION INTRAVENOUS PRN
Status: DISCONTINUED | OUTPATIENT
Start: 2025-07-31 | End: 2025-08-08 | Stop reason: HOSPADM

## 2025-07-31 RX ORDER — ACETAMINOPHEN 325 MG/1
650 TABLET ORAL EVERY 4 HOURS PRN
Status: DISCONTINUED | OUTPATIENT
Start: 2025-07-31 | End: 2025-07-31 | Stop reason: SDUPTHER

## 2025-07-31 RX ORDER — CLOPIDOGREL BISULFATE 75 MG/1
75 TABLET ORAL DAILY
Status: DISCONTINUED | OUTPATIENT
Start: 2025-07-31 | End: 2025-08-08 | Stop reason: HOSPADM

## 2025-07-31 RX ORDER — POTASSIUM CHLORIDE 1500 MG/1
40 TABLET, EXTENDED RELEASE ORAL PRN
Status: DISCONTINUED | OUTPATIENT
Start: 2025-07-31 | End: 2025-08-01

## 2025-07-31 RX ADMIN — DOCUSATE SODIUM 100 MG: 100 CAPSULE, LIQUID FILLED ORAL at 08:05

## 2025-07-31 RX ADMIN — OXYCODONE 10 MG: 5 TABLET ORAL at 19:32

## 2025-07-31 RX ADMIN — GABAPENTIN 400 MG: 400 CAPSULE ORAL at 20:35

## 2025-07-31 RX ADMIN — SERTRALINE 50 MG: 50 TABLET, FILM COATED ORAL at 08:05

## 2025-07-31 RX ADMIN — Medication 10 ML: at 20:38

## 2025-07-31 RX ADMIN — ATORVASTATIN CALCIUM 20 MG: 20 TABLET, FILM COATED ORAL at 08:05

## 2025-07-31 RX ADMIN — PANTOPRAZOLE SODIUM 40 MG: 40 TABLET, DELAYED RELEASE ORAL at 06:23

## 2025-07-31 RX ADMIN — Medication 10 ML: at 08:06

## 2025-07-31 RX ADMIN — OXYCODONE 10 MG: 5 TABLET ORAL at 06:22

## 2025-07-31 RX ADMIN — ASPIRIN 81 MG: 81 TABLET, DELAYED RELEASE ORAL at 08:05

## 2025-07-31 RX ADMIN — CLOPIDOGREL BISULFATE 75 MG: 75 TABLET, FILM COATED ORAL at 08:05

## 2025-07-31 RX ADMIN — GABAPENTIN 400 MG: 400 CAPSULE ORAL at 14:39

## 2025-07-31 RX ADMIN — ENOXAPARIN SODIUM 70 MG: 100 INJECTION SUBCUTANEOUS at 20:35

## 2025-07-31 RX ADMIN — ENOXAPARIN SODIUM 70 MG: 100 INJECTION SUBCUTANEOUS at 08:06

## 2025-07-31 RX ADMIN — METOPROLOL TARTRATE 50 MG: 50 TABLET, FILM COATED ORAL at 08:05

## 2025-07-31 RX ADMIN — GABAPENTIN 400 MG: 400 CAPSULE ORAL at 08:05

## 2025-07-31 RX ADMIN — LOSARTAN POTASSIUM 25 MG: 25 TABLET, FILM COATED ORAL at 08:05

## 2025-07-31 RX ADMIN — METOPROLOL TARTRATE 12.5 MG: 25 TABLET, FILM COATED ORAL at 20:36

## 2025-07-31 RX ADMIN — SODIUM CHLORIDE, SODIUM LACTATE, POTASSIUM CHLORIDE, AND CALCIUM CHLORIDE 500 ML: .6; .31; .03; .02 INJECTION, SOLUTION INTRAVENOUS at 14:41

## 2025-07-31 ASSESSMENT — PAIN DESCRIPTION - ORIENTATION
ORIENTATION: LEFT
ORIENTATION: LEFT

## 2025-07-31 ASSESSMENT — PAIN SCALES - GENERAL
PAINLEVEL_OUTOF10: 8
PAINLEVEL_OUTOF10: 7

## 2025-07-31 ASSESSMENT — PAIN DESCRIPTION - DESCRIPTORS
DESCRIPTORS: BURNING;TINGLING
DESCRIPTORS: DISCOMFORT

## 2025-07-31 ASSESSMENT — PAIN DESCRIPTION - LOCATION
LOCATION: LEG
LOCATION: LEG

## 2025-07-31 NOTE — PLAN OF CARE
Nutrition Problem #1: Unintended weight loss, Underweight  Intervention: Food and/or Nutrient Delivery: Continue Current Diet, Start Oral Nutrition Supplement

## 2025-07-31 NOTE — PROGRESS NOTES
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]  Acute occlusion of artery of lower extremity [I70.209]   Chief Complaint   Patient presents with    Leg Pain     Pt was sent to the ER by his doctors office for no pulse in his LLE,      Patient Active Problem List    Diagnosis Date Noted    Palliative care encounter 2025    Goals of care, counseling/discussion 2025    Advanced care planning/counseling discussion 2025    Depression 2025    Acute occlusion of artery of lower extremity 2025    History of arterial occlusion 2025    Femoral artery occlusion, left 2025    Tobacco use 2024    Dyspnea 2024    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    Aneurysm of iliac artery, right 2024    Spinal stenosis at L4-L5 level 2024    Erectile dysfunction 2024    Transaminitis 2024    Calf ulcer, left, limited to breakdown of skin (HCC) 2024    Atherosclerosis of native artery of left lower extremity with intermittent claudication 10/04/2023    Anxiety state 2023    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) 2023    GERD (gastroesophageal reflux disease) 2023    Substance induced mood disorder (HCC) 2022        Past Medical History:   Diagnosis Date    Alcoholism (HCC)     Anxiety     Atherosclerosis of native artery of left lower extremity with intermittent claudication     Depression     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)     GERD (gastroesophageal reflux disease)     Hx of blood clots     Hyperlipidemia     Hypertension     PVD (peripheral vascular disease)     Spinal stenosis at L4-L5 level

## 2025-07-31 NOTE — CARE COORDINATION
Plan remains [facility] at discharge. Patient has been medically cleared and is awaiting pre cert approval. Pre cert was started 7/29 and is still pending at this time.

## 2025-07-31 NOTE — PROGRESS NOTES
Hospitalist Progress Note      PCP: Lizandro Ruffin MD    Date of Admission: 7/23/2025    Chief Complaint:    Chief Complaint   Patient presents with    Leg Pain     Pt was sent to the ER by his doctors office for no pulse in his LLE,        Subjective:  Patient denies fevers, chills, sweats, CP, SOB, diarrhea or burning micturition.  12 point ROS negative other than mentioned above       Medications:  Reviewed    Infusion Medications    sodium chloride Stopped (07/24/25 2032)     Scheduled Medications    [START ON 8/1/2025] losartan  12.5 mg Oral Daily    metoprolol tartrate  12.5 mg Oral BID    lactated ringers  500 mL IntraVENous Once    enoxaparin  1 mg/kg SubCUTAneous BID    docusate sodium  100 mg Oral BID    sertraline  50 mg Oral Daily    aspirin  81 mg Oral Daily    atorvastatin  20 mg Oral Daily    clopidogrel  75 mg Oral Daily    gabapentin  400 mg Oral TID    pantoprazole  40 mg Oral QAM AC    nicotine  1 patch TransDERmal Daily    sodium chloride  500 mL Intra-arTERial Once    sodium chloride flush  5-40 mL IntraVENous 2 times per day     PRN Meds: zolpidem, oxyCODONE **OR** oxyCODONE, sodium chloride flush, sodium chloride, potassium chloride **OR** potassium alternative oral replacement **OR** potassium chloride, magnesium sulfate, ondansetron **OR** ondansetron, polyethylene glycol, acetaminophen **OR** acetaminophen      Intake/Output Summary (Last 24 hours) at 7/31/2025 1242  Last data filed at 7/31/2025 0607  Gross per 24 hour   Intake --   Output 2250 ml   Net -2250 ml       Exam:    /68 Comment: 99/66 mmHg  post ambulation  Pulse 54   Temp 98.4 °F (36.9 °C) (Oral)   Resp 18   Ht 1.778 m (5' 10\")   Wt 68.3 kg (150 lb 9.2 oz)   SpO2 98%   BMI 21.61 kg/m²     General appearance: No apparent distress, appears stated age and cooperative.  HEENT:  Conjunctivae/corneas clear.  Neck: Trachea midline.  Respiratory:  Normal respiratory effort. Clear to auscultation  Cardiovascular:  Regular rate and rhythm  Abdomen: Soft, non-tender, non-distended with normal bowel sounds.  Musculoskeletal: No clubbing, cyanosis or edema bilaterally  Neuro: Non Focal.     Labs:   Recent Labs     07/29/25  0430 07/30/25  0550 07/31/25  0609   WBC 14.6* 13.0* 12.9*   HGB 8.5* 8.9* 9.1*   HCT 24.3* 26.2* 26.7*    229 273     Recent Labs     07/29/25  1458   *   K 4.4   CL 98   CO2 24   BUN 14   CREATININE 0.69*   CALCIUM 8.5     No results for input(s): \"AST\", \"ALT\", \"BILIDIR\", \"BILITOT\", \"ALKPHOS\" in the last 72 hours.    No results for input(s): \"INR\" in the last 72 hours.  No results for input(s): \"CKTOTAL\", \"TROPONINI\" in the last 72 hours.    Urinalysis:      Lab Results   Component Value Date/Time    NITRU Negative 02/08/2022 08:00 PM    WBCUA 0-2 02/08/2022 08:00 PM    BACTERIA Negative 02/08/2022 08:00 PM    RBCUA 3-5 02/08/2022 08:00 PM    BLOODU Negative 02/08/2022 08:00 PM    GLUCOSEU Negative 02/08/2022 08:00 PM       Radiology:  FL LESS THAN 1 HOUR   Final Result      FL LESS THAN 1 HOUR   Final Result      CTA ABDOMINAL AORTA W BILAT RUNOFF W WO CONTRAST   Final Result   1. Occluded left femoral-popliteal bypass graft.   2. Occluded left popliteal artery.   3. Occluded left anterior and posterior tibial arteries.   4. No blood flow seen at level of left dorsalis pedis artery.   5. Patent right common and external iliac artery stent.   6. Occluded right posterior tibial artery.   7. Ectatic abdominal aorta measuring up to 2.7 cm in greatest dimension.   Follow-up imaging recommended every 5 years for further evaluation.         Vascular duplex lower extremity arteries left   Final Result   Occluded native arteries of left lower extremity distal to the common femoral   artery as well as occluded bypass graft.           Assessment/Plan:    Acute LLE arterial occlusion with history of PAD s/p fem-pop bypass and iliac stents  LLE compartment syndrome s/p fasciectomy   - vascular surgery

## 2025-07-31 NOTE — PROGRESS NOTES
Progress Note  Patient: Garry 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      Review of Systems:   Review of Systems  Afebrile    Physical Examination:    /70   Pulse 54   Temp 98.4 °F (36.9 °C) (Oral)   Resp 18   Ht 1.778 m (5' 10\")   Wt 68.3 kg (150 lb 9.2 oz)   SpO2 98%   BMI 21.61 kg/m²    Physical Exam  Alert and orient x 3  Cardiovascular regular rate and rhythm rhythm  Lungs lungs clear to auscultation bilaterally  Right groin soft no hematoma noted.  Chronic ecchymosis noted of the skin.  Left leg compartments are soft.  Left PT 2+ left peroneal triphasic left anterior tibial multiphasic  Left foot is warm and appears well-perfused skin however remains mottled    LABS:  CBC:   Lab Results   Component Value Date/Time    WBC 12.9 07/31/2025 06:09 AM    RBC 2.93 07/31/2025 06:09 AM    HGB 9.1 07/31/2025 06:09 AM    HCT 26.7 07/31/2025 06:09 AM    MCV 91.1 07/31/2025 06:09 AM    MCH 31.1 07/31/2025 06:09 AM    MCHC 34.1 07/31/2025 06:09 AM    RDW 14.9 07/31/2025 06:09 AM     07/31/2025 06:09 AM     CBC with Differential:   Lab Results   Component Value Date/Time    WBC 12.9 07/31/2025 06:09 AM    RBC 2.93 07/31/2025 06:09 AM    HGB 9.1 07/31/2025 06:09 AM    HCT 26.7 07/31/2025 06:09 AM     07/31/2025 06:09 AM    MCV 91.1 07/31/2025 06:09 AM    MCH 31.1 07/31/2025 06:09 AM    MCHC 34.1 07/31/2025 06:09 AM    RDW 14.9 07/31/2025 06:09 AM    LYMPHOPCT 16.3 07/31/2025 06:09 AM    MONOPCT 10.6 07/31/2025 06:09 AM    EOSPCT 4.8 07/31/2025 06:09 AM    BASOPCT 0.9 07/31/2025 06:09 AM    MONOSABS 1.4 07/31/2025 06:09 AM    LYMPHSABS 2.1 07/31/2025 06:09 AM    EOSABS 0.6 07/31/2025 06:09 AM    BASOSABS 0.1 07/31/2025 06:09 AM     CMP:   and noncalcified atherosclerotic plaque associated with the abdominal aorta.  The abdominal aorta is ectatic measuring up to 2.7 cm in greatest dimension.  No significant stenosis involving SMA E or celiac trunk.  No stenosis involving the renal arteries. Right lower extremity: There is a stent located in the right common and external iliac arteries. This stent is patent.  There is mild stenosis involving right common iliac artery proximal to the stent.  Right external iliac arteries patent without evidence of stenosis.  Right common femoral artery is patent.  Profundal is patent.  Right superficial femoral artery is patent without evidence of stenosis.  Right popliteal artery is patent.  Right posterior tibial artery is occluded.  Right anterior tibial artery is patent.  Patent right dorsalis pedis artery. Left lower extremity: Left common iliac artery and external iliac arteries are patent without evidence of stenosis.  Left common femoral artery is patent.  Profundal is patent.  Left femoral popliteal bypass graft is occluded.  Left popliteal artery is occluded.  Left anterior and posterior tibial arteries are occluded.  No blood flow seen at level of the Darlene left dorsalis pedis artery. No subcutaneous gas associated with right or left foot, ankle, or leg.  No loculated fluid collections.  No synovial effusion involving right or left knee. The liver, gallbladder, pancreas, and spleen are unremarkable.  Adrenal glands are unremarkable.  No hydronephrosis.  There is nonspecific bilateral perinephric fat stranding which may indicate chronic medical renal disease with appropriate clinical history.  There is normal attenuation to both kidneys.  No bowel obstruction, free air, or free fluid.  The appendix is not definitively visualized.  Urinary bladder is unremarkable.  There is a small fat containing left inguinal hernia measuring up to 2.3 cm.  No evidence of pneumonia in the visualized lower lung fields.     1.

## 2025-07-31 NOTE — CARE COORDINATION
Fax received from [employer] with IR/F approval. Approved 7/31-8/7. LCD 8/6, NRD 8/7. AUTH# [id number].  Electronically signed by Gaby Bradshaw on 7/31/2025 at 2:00 PM

## 2025-07-31 NOTE — PROGRESS NOTES
Comprehensive Nutrition Assessment    Type and Reason for Visit:  Initial, LOS    Nutrition Recommendations/Plan:    Continue Current Diet, Start Oral Nutrition Supplement high calorie supplement tid and wound healing supplement bid)     Malnutrition Assessment:  Malnutrition Status:  At risk for malnutrition (07/31/25 1359)    Context:  Social/Environmental Circumstances     Findings of the 6 clinical characteristics of malnutrition:  Energy Intake:  Mild decrease in energy intake  Weight Loss:  Mild weight loss (~8.5% in 1 year)     Body Fat Loss:  No body fat loss     Muscle Mass Loss:  Mild muscle mass loss Temples (temporalis), Thigh (quadraceps), Calf (gastrocnemius)  Fluid Accumulation:  Unable to assess     Strength:  Not Performed    Nutrition Assessment:    Pt presents with good appetite currently and pta, but reports 'not always eating enough' pta, with resulting gradual weight loss noted. Pt reports eating well throughout hospital stay, with no nutritional complaints. To provided high calorie supplement tid with meals to promote weight gain as well as wound healing supplement bid to promote wound healing. To continue to follow.    Nutrition Related Findings:    PMH-GERD, htn, hld, etoh abuse; admitted with L foot pain, s/p vascular surgery on 7/24. Labs/meds reviewed. Hyponatremia noted. Meds include-colace, lipitor, PPI. Wound Type: Surgical Incision     Current Nutrition Intake & Therapies:    Average Meal Intake: %     ADULT DIET; Regular    Anthropometric Measures:  Height: 177.8 cm (5' 10\")  Ideal Body Weight (IBW): 166 lbs (75 kg)    Admission Body Weight: 66.2 kg (146 lb) ([facility])  Current Body Weight: 68.3 kg (150 lb 9.2 oz) (7/31),   Weight Source: Bed scale  Current BMI (kg/m2): 21.6  Usual Body Weight: 74.7 kg (164 lb 10.9 oz) (7/2024 actual)     % Weight Change (Calculated): -8.6                    BMI Categories: Normal Weight (BMI 18.5-24.9)    Estimated Daily Nutrient

## 2025-07-31 NOTE — PROGRESS NOTES
Pulmonary  Medicine Progress Note  Chief complaint : Low blood pressure    Subjunctive/   No complaint, no chest pain, shortness of breath, had episodes of low blood pressure yesterday, tachycardic, currently no issues.  No fever, no coughing.    New information updated in the note today, rest of the examination did not change compared to yesterday.  Social History     Tobacco Use    Smoking status: Every Day     Current packs/day: 1.00     Average packs/day: 1 pack/day for 20.0 years (20.0 ttl pk-yrs)     Types: Cigarettes     Passive exposure: Never    Smokeless tobacco: Never   Substance Use Topics    Alcohol use: Not Currently     Comment: pt has not had a drink in 1 yr and 2 months/history of alcoholism         Problem Relation Age of Onset    Heart Disease Father        No results for input(s): \"PHART\", \"QGO6RQN\", \"PO2ART\" in the last 72 hours.    MV Settings:     / / /            IV:   sodium chloride Stopped (07/24/25 2032)       Vitals:  /70   Pulse 54   Temp 98.4 °F (36.9 °C) (Oral)   Resp 18   Ht 1.778 m (5' 10\")   Wt 68.3 kg (150 lb 9.2 oz)   SpO2 98%   BMI 21.61 kg/m²    Tmax:        Intake/Output Summary (Last 24 hours) at 7/31/2025 0939  Last data filed at 7/31/2025 0607  Gross per 24 hour   Intake --   Output 2750 ml   Net -2750 ml       EXAM:  General: alert, cooperative, no distress  Head: normocephalic, atraumatic  Eyes:No gross abnormalities.  ENT:  MMM no lesions  Neck:  supple and no masses  Chest : clear to auscultation bilaterally- no wheezes, rales or rhonchi, normal air movement, no respiratory distress  Heart:: Heart sounds are normal.  Regular rate and rhythm without murmur, gallop or rub.  ABD:  symmetric, soft, non-tender  Musculoskeletal : No edema, skin of left foot is mottled surgical dressing, pulses palpable posterior tibialis, no tenderness  Neuro:  Grossly normal  Skin: No rashes or nodules noted.  Lymph node:  no cervical nodes  Urology: No Harvey   Psychiatric:

## 2025-07-31 NOTE — CARE COORDINATION
Call placed to [facility], [phone] and spoke with Adry who informs IRF precert remains pending at this time. Reference# [id number]. Will continue to await decision.  Electronically signed by Gaby Bradshaw on 7/31/2025 at 8:07 AM

## 2025-07-31 NOTE — CARE COORDINATION
[facility] Pre-Admission Screening Document ADDENDUM-See also PAS completed on 7/29/2025      Hospital Course: 65 y.o. male patient sent to ER 7/23 d/t initially presenting to PCP office with complaints of left foot pain and mobility issues. C/f occlusion as patient with cool temperature and absent pulses. CTA confirming occlusion to LLE, does also show occluded right posterior tibial artery. Patient admitted and to OR 7/24 with vascular for fem-pop bypass. Care c/b compartment syndrome requiring requiring return to OR for fasciotomy. Additional complexity of Afib RVR, HTN but then periods of hypotension s/p starting beta- blocker. Patient on heparin gtt but transitioned to lovenox.  Medical status/changes: Transfer to ARU held d/t awaiting decision from insurance    Internal Medicine:  Acute LLE arterial occlusion with history of PAD s/p fem-pop bypass and iliac stents  LLE compartment syndrome s/p fasciectomy   - vascular surgery following, s/p vascular surgery interventions on 7/24, see op notes for details  - continue ICU level care for close hemodynamic monitoring and frequent neurovascular checks, critical care following  - continue heparin gtt, continue DAPT and statin; has pulses and foot is warm  - pain control with 5-10mg oxy q4h prn     Hypertensive urgency with known underlying HTN  - /107, pt states BP is largely well controlled at home, likely due to pain and acute arterial occlusion  - BP significantly improved  - continue home BP meds with IV meds prn for SBP >160     HLD: continue statin  Depression: continue home sertraline  Tobacco use: encouraged cessation, NRT while inpatient     Vascular:  Plan:  Continue heparin drip to therapeutic goal anti-xa 0.3-0.5. Continue aspirin Plavix.  Cont physical therapy.  Patient amenable to acute rehab if that is deemed necessary by physical therapy.  Daily dressing changes with nursing  Some episodes of  assist  Subcutaneous Air/Crepitus: None  Breath Sounds  Respiratory Pattern: Regular      Cognition and Behavior:  Language Preference (if other than English):      Alertness/Behavior  Neuro (WDL): Exceptions to WDL  Level of Consciousness: Alert (0)  History of Falling: No      Short Term Memory Deficits     History of Falling: No    Safety          CURRENT FUNCTIONAL LEVEL:  Physical Therapy  Bed mobility:  Bed mobility  Rolling to Left: Supervision (07/28/25 1311)  Supine to Sit: Modified independent (07/31/25 1135)  Sit to Supine: Modified independent (07/31/25 1135)  Scooting: Supervision (07/27/25 1016)  Bed Mobility Comments: HOB slightly elevated; completes without bed rail (07/31/25 1135)  Transfers:  Transfers  Sit to Stand: Stand by assistance (07/31/25 1135)  Stand to Sit: Stand by assistance (07/31/25 1135)  Bed to Chair: Stand by assistance (07/31/25 1135)  Comment: with ww; requires verbal cues for hand placement (07/31/25 1135)  Gait:   Ambulation  Surface: Level tile (07/31/25 1135)  Device: Rolling Walker (07/31/25 1135)  Assistance: Stand by assistance (07/28/25 1312)  Quality of Gait: step to antalgic gait with left drop foot, improving LLE weight acceptance, flexed posture, fatigued quickly (07/31/25 1135)  Gait Deviations: Slow Sherley (07/31/25 1135)  Distance: 25 feet x2- 1 seated RB due to lightheadedness (07/31/25 1135)  Stairs:  Stairs/Curb  Stairs?:  (NT - d/t safety concerns and decreased weight bearing.) (07/31/25 1135)  W/C mobility:         Occupational Therapy  Hand Dominance: Right  ADL  Feeding: Setup (07/29/25 1134)  Grooming: Setup (07/29/25 1134)  UE Bathing: Supervision (07/29/25 1134)  LE Bathing: Minimal assistance (07/29/25 1134)  LE Bathing Skilled Clinical Factors: anticipate CGA for balance (07/29/25 1134)  UE Dressing: Supervision (07/29/25 1134)  LE Dressing: Minimal assistance (07/29/25 1134)  LE Dressing Skilled Clinical Factors: anticipate CGA for balance (07/29/25

## 2025-08-01 LAB
BASOPHILS # BLD: 0.1 K/UL (ref 0–0.2)
BASOPHILS NFR BLD: 1 %
EOSINOPHIL # BLD: 0.6 K/UL (ref 0–0.7)
EOSINOPHIL NFR BLD: 4.5 %
ERYTHROCYTE [DISTWIDTH] IN BLOOD BY AUTOMATED COUNT: 15.4 % (ref 11.5–14.5)
HCT VFR BLD AUTO: 27.7 % (ref 42–52)
HGB BLD-MCNC: 8.9 G/DL (ref 14–18)
LYMPHOCYTES # BLD: 2.4 K/UL (ref 1–4.8)
LYMPHOCYTES NFR BLD: 17.5 %
MCH RBC QN AUTO: 30.7 PG (ref 27–31.3)
MCHC RBC AUTO-ENTMCNC: 32.1 % (ref 33–37)
MCV RBC AUTO: 95.5 FL (ref 79–92.2)
MONOCYTES # BLD: 1.4 K/UL (ref 0.2–0.8)
MONOCYTES NFR BLD: 10.3 %
NEUTROPHILS # BLD: 8.7 K/UL (ref 1.4–6.5)
NEUTS SEG NFR BLD: 64.8 %
PLATELET # BLD AUTO: 284 K/UL (ref 130–400)
POC ACT LR: 366 SEC
RBC # BLD AUTO: 2.9 M/UL (ref 4.7–6.1)
WBC # BLD AUTO: 13.4 K/UL (ref 4.8–10.8)

## 2025-08-01 PROCEDURE — 97535 SELF CARE MNGMENT TRAINING: CPT

## 2025-08-01 PROCEDURE — 6360000002 HC RX W HCPCS: Performed by: INTERNAL MEDICINE

## 2025-08-01 PROCEDURE — 97116 GAIT TRAINING THERAPY: CPT

## 2025-08-01 PROCEDURE — 85025 COMPLETE CBC W/AUTO DIFF WBC: CPT

## 2025-08-01 PROCEDURE — 6370000000 HC RX 637 (ALT 250 FOR IP): Performed by: INTERNAL MEDICINE

## 2025-08-01 PROCEDURE — 36415 COLL VENOUS BLD VENIPUNCTURE: CPT

## 2025-08-01 PROCEDURE — 2500000003 HC RX 250 WO HCPCS: Performed by: INTERNAL MEDICINE

## 2025-08-01 PROCEDURE — 97110 THERAPEUTIC EXERCISES: CPT

## 2025-08-01 PROCEDURE — 97162 PT EVAL MOD COMPLEX 30 MIN: CPT

## 2025-08-01 PROCEDURE — 97530 THERAPEUTIC ACTIVITIES: CPT

## 2025-08-01 PROCEDURE — 2500000003 HC RX 250 WO HCPCS: Performed by: PHYSICAL MEDICINE & REHABILITATION

## 2025-08-01 PROCEDURE — 1180000000 HC REHAB R&B

## 2025-08-01 PROCEDURE — 97166 OT EVAL MOD COMPLEX 45 MIN: CPT

## 2025-08-01 RX ORDER — POLYETHYLENE GLYCOL 3350 17 G/17G
17 POWDER, FOR SOLUTION ORAL 2 TIMES DAILY
Status: DISCONTINUED | OUTPATIENT
Start: 2025-08-01 | End: 2025-08-08 | Stop reason: HOSPADM

## 2025-08-01 RX ORDER — MAGNESIUM HYDROXIDE 1200 MG/15ML
500 LIQUID ORAL DAILY
Status: DISCONTINUED | OUTPATIENT
Start: 2025-08-01 | End: 2025-08-08 | Stop reason: HOSPADM

## 2025-08-01 RX ADMIN — CLOPIDOGREL BISULFATE 75 MG: 75 TABLET, FILM COATED ORAL at 08:47

## 2025-08-01 RX ADMIN — OXYCODONE 10 MG: 5 TABLET ORAL at 08:44

## 2025-08-01 RX ADMIN — GABAPENTIN 400 MG: 400 CAPSULE ORAL at 20:30

## 2025-08-01 RX ADMIN — OXYCODONE 10 MG: 5 TABLET ORAL at 18:40

## 2025-08-01 RX ADMIN — METOPROLOL TARTRATE 12.5 MG: 25 TABLET, FILM COATED ORAL at 20:30

## 2025-08-01 RX ADMIN — DOCUSATE SODIUM 100 MG: 100 CAPSULE, LIQUID FILLED ORAL at 08:48

## 2025-08-01 RX ADMIN — SERTRALINE HYDROCHLORIDE 50 MG: 50 TABLET ORAL at 08:59

## 2025-08-01 RX ADMIN — Medication 10 ML: at 08:59

## 2025-08-01 RX ADMIN — DOCUSATE SODIUM 100 MG: 100 CAPSULE, LIQUID FILLED ORAL at 20:30

## 2025-08-01 RX ADMIN — GABAPENTIN 400 MG: 400 CAPSULE ORAL at 13:50

## 2025-08-01 RX ADMIN — METOPROLOL TARTRATE 12.5 MG: 25 TABLET, FILM COATED ORAL at 08:46

## 2025-08-01 RX ADMIN — LOSARTAN POTASSIUM 12.5 MG: 25 TABLET, FILM COATED ORAL at 08:47

## 2025-08-01 RX ADMIN — PANTOPRAZOLE SODIUM 40 MG: 40 TABLET, DELAYED RELEASE ORAL at 06:32

## 2025-08-01 RX ADMIN — POLYETHYLENE GLYCOL 3350 17 G: 17 POWDER, FOR SOLUTION ORAL at 11:41

## 2025-08-01 RX ADMIN — ASPIRIN 81 MG: 81 TABLET, DELAYED RELEASE ORAL at 08:48

## 2025-08-01 RX ADMIN — GABAPENTIN 400 MG: 400 CAPSULE ORAL at 08:47

## 2025-08-01 RX ADMIN — SODIUM CHLORIDE 500 ML: 900 IRRIGANT IRRIGATION at 13:51

## 2025-08-01 RX ADMIN — ENOXAPARIN SODIUM 70 MG: 100 INJECTION SUBCUTANEOUS at 20:28

## 2025-08-01 RX ADMIN — ENOXAPARIN SODIUM 70 MG: 100 INJECTION SUBCUTANEOUS at 08:48

## 2025-08-01 RX ADMIN — POLYETHYLENE GLYCOL 3350 17 G: 17 POWDER, FOR SOLUTION ORAL at 20:25

## 2025-08-01 RX ADMIN — ATORVASTATIN CALCIUM 20 MG: 20 TABLET, FILM COATED ORAL at 08:47

## 2025-08-01 ASSESSMENT — PAIN DESCRIPTION - ORIENTATION
ORIENTATION: LEFT
ORIENTATION: RIGHT;LEFT;LOWER

## 2025-08-01 ASSESSMENT — PAIN SCALES - GENERAL
PAINLEVEL_OUTOF10: 3
PAINLEVEL_OUTOF10: 8
PAINLEVEL_OUTOF10: 7

## 2025-08-01 ASSESSMENT — PAIN DESCRIPTION - DESCRIPTORS
DESCRIPTORS: ACHING
DESCRIPTORS: ACHING;DISCOMFORT

## 2025-08-01 ASSESSMENT — PAIN - FUNCTIONAL ASSESSMENT
PAIN_FUNCTIONAL_ASSESSMENT: ACTIVITIES ARE NOT PREVENTED
PAIN_FUNCTIONAL_ASSESSMENT: PREVENTS OR INTERFERES SOME ACTIVE ACTIVITIES AND ADLS

## 2025-08-01 ASSESSMENT — PAIN SCALES - WONG BAKER: WONGBAKER_NUMERICALRESPONSE: HURTS A LITTLE BIT

## 2025-08-01 ASSESSMENT — PAIN DESCRIPTION - LOCATION: LOCATION: LEG;INCISION

## 2025-08-01 NOTE — PROGRESS NOTES
Physical Therapy  Facility/Department: [facility] MED SURG W194/W194-01  Physical Therapy Discharge      NAME: Grary Gamez    : 1960 (65 y.o.)  MRN: [id number]    Account: [id number]  Gender: male      Patient has been discharged from acute care hospital. DC patient from current PT program.      Electronically signed by Mary Castro PT on 25 at 3:57 PM EDT

## 2025-08-02 PROCEDURE — 97535 SELF CARE MNGMENT TRAINING: CPT

## 2025-08-02 PROCEDURE — 1180000000 HC REHAB R&B

## 2025-08-02 PROCEDURE — 97110 THERAPEUTIC EXERCISES: CPT

## 2025-08-02 PROCEDURE — 6360000002 HC RX W HCPCS: Performed by: INTERNAL MEDICINE

## 2025-08-02 PROCEDURE — 97116 GAIT TRAINING THERAPY: CPT

## 2025-08-02 PROCEDURE — 2500000003 HC RX 250 WO HCPCS: Performed by: INTERNAL MEDICINE

## 2025-08-02 PROCEDURE — 6370000000 HC RX 637 (ALT 250 FOR IP): Performed by: INTERNAL MEDICINE

## 2025-08-02 PROCEDURE — 97530 THERAPEUTIC ACTIVITIES: CPT

## 2025-08-02 PROCEDURE — 97112 NEUROMUSCULAR REEDUCATION: CPT

## 2025-08-02 RX ADMIN — METOPROLOL TARTRATE 12.5 MG: 25 TABLET, FILM COATED ORAL at 19:58

## 2025-08-02 RX ADMIN — OXYCODONE 10 MG: 5 TABLET ORAL at 08:33

## 2025-08-02 RX ADMIN — PANTOPRAZOLE SODIUM 40 MG: 40 TABLET, DELAYED RELEASE ORAL at 05:37

## 2025-08-02 RX ADMIN — ENOXAPARIN SODIUM 70 MG: 100 INJECTION SUBCUTANEOUS at 19:59

## 2025-08-02 RX ADMIN — SODIUM CHLORIDE, PRESERVATIVE FREE 10 ML: 5 INJECTION INTRAVENOUS at 19:59

## 2025-08-02 RX ADMIN — OXYCODONE 10 MG: 5 TABLET ORAL at 12:35

## 2025-08-02 RX ADMIN — DOCUSATE SODIUM 100 MG: 100 CAPSULE, LIQUID FILLED ORAL at 12:35

## 2025-08-02 RX ADMIN — GABAPENTIN 400 MG: 400 CAPSULE ORAL at 19:58

## 2025-08-02 RX ADMIN — ATORVASTATIN CALCIUM 20 MG: 20 TABLET, FILM COATED ORAL at 08:33

## 2025-08-02 RX ADMIN — SERTRALINE HYDROCHLORIDE 50 MG: 50 TABLET ORAL at 08:34

## 2025-08-02 RX ADMIN — METOPROLOL TARTRATE 12.5 MG: 25 TABLET, FILM COATED ORAL at 08:34

## 2025-08-02 RX ADMIN — CLOPIDOGREL BISULFATE 75 MG: 75 TABLET, FILM COATED ORAL at 08:33

## 2025-08-02 RX ADMIN — GABAPENTIN 400 MG: 400 CAPSULE ORAL at 08:34

## 2025-08-02 RX ADMIN — OXYCODONE 10 MG: 5 TABLET ORAL at 19:58

## 2025-08-02 RX ADMIN — ENOXAPARIN SODIUM 70 MG: 100 INJECTION SUBCUTANEOUS at 08:34

## 2025-08-02 RX ADMIN — ASPIRIN 81 MG: 81 TABLET, DELAYED RELEASE ORAL at 08:33

## 2025-08-02 RX ADMIN — POLYETHYLENE GLYCOL 3350 17 G: 17 POWDER, FOR SOLUTION ORAL at 19:59

## 2025-08-02 RX ADMIN — GABAPENTIN 400 MG: 400 CAPSULE ORAL at 12:35

## 2025-08-02 RX ADMIN — DOCUSATE SODIUM 100 MG: 100 CAPSULE, LIQUID FILLED ORAL at 19:58

## 2025-08-02 RX ADMIN — POLYETHYLENE GLYCOL 3350 17 G: 17 POWDER, FOR SOLUTION ORAL at 12:35

## 2025-08-02 ASSESSMENT — PAIN SCALES - GENERAL
PAINLEVEL_OUTOF10: 6
PAINLEVEL_OUTOF10: 7
PAINLEVEL_OUTOF10: 7
PAINLEVEL_OUTOF10: 8
PAINLEVEL_OUTOF10: 5

## 2025-08-02 ASSESSMENT — PAIN DESCRIPTION - DESCRIPTORS
DESCRIPTORS: ACHING

## 2025-08-02 ASSESSMENT — PAIN DESCRIPTION - ORIENTATION
ORIENTATION: LEFT

## 2025-08-02 ASSESSMENT — PAIN DESCRIPTION - LOCATION
LOCATION: FOOT
LOCATION: LEG;FOOT
LOCATION: LEG;FOOT

## 2025-08-03 PROCEDURE — 6370000000 HC RX 637 (ALT 250 FOR IP): Performed by: INTERNAL MEDICINE

## 2025-08-03 PROCEDURE — 6360000002 HC RX W HCPCS: Performed by: INTERNAL MEDICINE

## 2025-08-03 PROCEDURE — 1180000000 HC REHAB R&B

## 2025-08-03 RX ORDER — SENNOSIDES 8.6 MG/1
1 TABLET ORAL 2 TIMES DAILY
Status: DISCONTINUED | OUTPATIENT
Start: 2025-08-03 | End: 2025-08-08 | Stop reason: HOSPADM

## 2025-08-03 RX ADMIN — ENOXAPARIN SODIUM 70 MG: 100 INJECTION SUBCUTANEOUS at 20:14

## 2025-08-03 RX ADMIN — METOPROLOL TARTRATE 12.5 MG: 25 TABLET, FILM COATED ORAL at 20:14

## 2025-08-03 RX ADMIN — GABAPENTIN 400 MG: 400 CAPSULE ORAL at 08:54

## 2025-08-03 RX ADMIN — ENOXAPARIN SODIUM 70 MG: 100 INJECTION SUBCUTANEOUS at 08:54

## 2025-08-03 RX ADMIN — PANTOPRAZOLE SODIUM 40 MG: 40 TABLET, DELAYED RELEASE ORAL at 06:03

## 2025-08-03 RX ADMIN — OXYCODONE 5 MG: 5 TABLET ORAL at 06:03

## 2025-08-03 RX ADMIN — ASPIRIN 81 MG: 81 TABLET, DELAYED RELEASE ORAL at 08:54

## 2025-08-03 RX ADMIN — CLOPIDOGREL BISULFATE 75 MG: 75 TABLET, FILM COATED ORAL at 08:54

## 2025-08-03 RX ADMIN — OXYCODONE 5 MG: 5 TABLET ORAL at 14:56

## 2025-08-03 RX ADMIN — POLYETHYLENE GLYCOL 3350 17 G: 17 POWDER, FOR SOLUTION ORAL at 08:54

## 2025-08-03 RX ADMIN — GABAPENTIN 400 MG: 400 CAPSULE ORAL at 20:14

## 2025-08-03 RX ADMIN — SERTRALINE HYDROCHLORIDE 50 MG: 50 TABLET ORAL at 08:54

## 2025-08-03 RX ADMIN — DOCUSATE SODIUM 100 MG: 100 CAPSULE, LIQUID FILLED ORAL at 08:54

## 2025-08-03 RX ADMIN — OXYCODONE 10 MG: 5 TABLET ORAL at 20:15

## 2025-08-03 RX ADMIN — METOPROLOL TARTRATE 12.5 MG: 25 TABLET, FILM COATED ORAL at 08:55

## 2025-08-03 RX ADMIN — ATORVASTATIN CALCIUM 20 MG: 20 TABLET, FILM COATED ORAL at 08:54

## 2025-08-03 RX ADMIN — SENNOSIDES 8.6 MG: 8.6 TABLET, FILM COATED ORAL at 14:56

## 2025-08-03 RX ADMIN — GABAPENTIN 400 MG: 400 CAPSULE ORAL at 15:16

## 2025-08-03 ASSESSMENT — PAIN SCALES - GENERAL
PAINLEVEL_OUTOF10: 7
PAINLEVEL_OUTOF10: 8
PAINLEVEL_OUTOF10: 6

## 2025-08-03 ASSESSMENT — PAIN DESCRIPTION - DESCRIPTORS
DESCRIPTORS: ACHING
DESCRIPTORS: ACHING;DISCOMFORT

## 2025-08-03 ASSESSMENT — PAIN DESCRIPTION - LOCATION
LOCATION: ANKLE;LEG
LOCATION: LEG
LOCATION: LEG

## 2025-08-03 ASSESSMENT — PAIN DESCRIPTION - ORIENTATION
ORIENTATION: LEFT

## 2025-08-04 PROBLEM — D72.829 LEUKOCYTOSIS: Status: ACTIVE | Noted: 2025-08-04

## 2025-08-04 PROBLEM — I99.8 ISCHEMIA OF FOOT: Status: ACTIVE | Noted: 2025-08-04

## 2025-08-04 LAB
ANION GAP SERPL CALCULATED.3IONS-SCNC: 10 MEQ/L (ref 9–15)
BASOPHILS # BLD: 0.1 K/UL (ref 0–0.2)
BASOPHILS NFR BLD: 0.9 %
BUN SERPL-MCNC: 15 MG/DL (ref 8–23)
CALCIUM SERPL-MCNC: 8.3 MG/DL (ref 8.5–9.9)
CHLORIDE SERPL-SCNC: 103 MEQ/L (ref 95–107)
CO2 SERPL-SCNC: 23 MEQ/L (ref 20–31)
CREAT SERPL-MCNC: 0.65 MG/DL (ref 0.7–1.2)
EOSINOPHIL # BLD: 0.5 K/UL (ref 0–0.7)
EOSINOPHIL NFR BLD: 3.6 %
ERYTHROCYTE [DISTWIDTH] IN BLOOD BY AUTOMATED COUNT: 16.4 % (ref 11.5–14.5)
GLUCOSE SERPL-MCNC: 106 MG/DL (ref 70–99)
HCT VFR BLD AUTO: 26.6 % (ref 42–52)
HGB BLD-MCNC: 8.8 G/DL (ref 14–18)
LYMPHOCYTES # BLD: 2.2 K/UL (ref 1–4.8)
LYMPHOCYTES NFR BLD: 16.7 %
MCH RBC QN AUTO: 31.4 PG (ref 27–31.3)
MCHC RBC AUTO-ENTMCNC: 33.1 % (ref 33–37)
MCV RBC AUTO: 95 FL (ref 79–92.2)
MONOCYTES # BLD: 1.3 K/UL (ref 0.2–0.8)
MONOCYTES NFR BLD: 10.1 %
NEUTROPHILS # BLD: 8.5 K/UL (ref 1.4–6.5)
NEUTS SEG NFR BLD: 66 %
PLATELET # BLD AUTO: 365 K/UL (ref 130–400)
POTASSIUM SERPL-SCNC: 4.3 MEQ/L (ref 3.4–4.9)
RBC # BLD AUTO: 2.8 M/UL (ref 4.7–6.1)
SODIUM SERPL-SCNC: 136 MEQ/L (ref 135–144)
WBC # BLD AUTO: 12.9 K/UL (ref 4.8–10.8)

## 2025-08-04 PROCEDURE — 36415 COLL VENOUS BLD VENIPUNCTURE: CPT

## 2025-08-04 PROCEDURE — 97116 GAIT TRAINING THERAPY: CPT

## 2025-08-04 PROCEDURE — 85025 COMPLETE CBC W/AUTO DIFF WBC: CPT

## 2025-08-04 PROCEDURE — 99254 IP/OBS CNSLTJ NEW/EST MOD 60: CPT | Performed by: INTERNAL MEDICINE

## 2025-08-04 PROCEDURE — 6370000000 HC RX 637 (ALT 250 FOR IP): Performed by: INTERNAL MEDICINE

## 2025-08-04 PROCEDURE — 80048 BASIC METABOLIC PNL TOTAL CA: CPT

## 2025-08-04 PROCEDURE — 97535 SELF CARE MNGMENT TRAINING: CPT

## 2025-08-04 PROCEDURE — 2500000003 HC RX 250 WO HCPCS: Performed by: PHYSICAL MEDICINE & REHABILITATION

## 2025-08-04 PROCEDURE — 97530 THERAPEUTIC ACTIVITIES: CPT

## 2025-08-04 PROCEDURE — 94640 AIRWAY INHALATION TREATMENT: CPT

## 2025-08-04 PROCEDURE — 97110 THERAPEUTIC EXERCISES: CPT

## 2025-08-04 PROCEDURE — 6360000002 HC RX W HCPCS: Performed by: INTERNAL MEDICINE

## 2025-08-04 PROCEDURE — 6370000000 HC RX 637 (ALT 250 FOR IP): Performed by: PHYSICAL MEDICINE & REHABILITATION

## 2025-08-04 PROCEDURE — 1180000000 HC REHAB R&B

## 2025-08-04 RX ADMIN — OXYCODONE 5 MG: 5 TABLET ORAL at 10:53

## 2025-08-04 RX ADMIN — SENNOSIDES 8.6 MG: 8.6 TABLET, FILM COATED ORAL at 10:53

## 2025-08-04 RX ADMIN — CLOPIDOGREL BISULFATE 75 MG: 75 TABLET, FILM COATED ORAL at 10:53

## 2025-08-04 RX ADMIN — ENOXAPARIN SODIUM 70 MG: 100 INJECTION SUBCUTANEOUS at 10:59

## 2025-08-04 RX ADMIN — ATORVASTATIN CALCIUM 20 MG: 20 TABLET, FILM COATED ORAL at 10:53

## 2025-08-04 RX ADMIN — ENOXAPARIN SODIUM 70 MG: 100 INJECTION SUBCUTANEOUS at 20:11

## 2025-08-04 RX ADMIN — SODIUM CHLORIDE 500 ML: 900 IRRIGANT IRRIGATION at 13:44

## 2025-08-04 RX ADMIN — POLYETHYLENE GLYCOL 3350 17 G: 17 POWDER, FOR SOLUTION ORAL at 10:53

## 2025-08-04 RX ADMIN — GABAPENTIN 400 MG: 400 CAPSULE ORAL at 20:11

## 2025-08-04 RX ADMIN — METOPROLOL TARTRATE 12.5 MG: 25 TABLET, FILM COATED ORAL at 10:54

## 2025-08-04 RX ADMIN — PANTOPRAZOLE SODIUM 40 MG: 40 TABLET, DELAYED RELEASE ORAL at 05:15

## 2025-08-04 RX ADMIN — OXYCODONE 10 MG: 5 TABLET ORAL at 05:15

## 2025-08-04 RX ADMIN — ASPIRIN 81 MG: 81 TABLET, DELAYED RELEASE ORAL at 10:53

## 2025-08-04 RX ADMIN — METOPROLOL TARTRATE 12.5 MG: 25 TABLET, FILM COATED ORAL at 20:11

## 2025-08-04 RX ADMIN — GABAPENTIN 400 MG: 400 CAPSULE ORAL at 13:22

## 2025-08-04 RX ADMIN — ALBUTEROL SULFATE 2 PUFF: 90 AEROSOL, METERED RESPIRATORY (INHALATION) at 13:55

## 2025-08-04 RX ADMIN — DOCUSATE SODIUM 100 MG: 100 CAPSULE, LIQUID FILLED ORAL at 10:53

## 2025-08-04 RX ADMIN — GABAPENTIN 400 MG: 400 CAPSULE ORAL at 10:54

## 2025-08-04 RX ADMIN — SERTRALINE HYDROCHLORIDE 50 MG: 50 TABLET ORAL at 10:53

## 2025-08-04 RX ADMIN — OXYCODONE 10 MG: 5 TABLET ORAL at 20:11

## 2025-08-04 ASSESSMENT — PAIN SCALES - GENERAL
PAINLEVEL_OUTOF10: 8
PAINLEVEL_OUTOF10: 7
PAINLEVEL_OUTOF10: 8

## 2025-08-04 ASSESSMENT — ENCOUNTER SYMPTOMS
COLOR CHANGE: 1
GASTROINTESTINAL NEGATIVE: 1
RESPIRATORY NEGATIVE: 1

## 2025-08-04 ASSESSMENT — PAIN DESCRIPTION - LOCATION
LOCATION: FOOT;LEG
LOCATION: LEG
LOCATION: LEG

## 2025-08-04 ASSESSMENT — PAIN DESCRIPTION - ORIENTATION
ORIENTATION: LEFT
ORIENTATION: LEFT

## 2025-08-04 ASSESSMENT — PAIN DESCRIPTION - DESCRIPTORS: DESCRIPTORS: ACHING

## 2025-08-05 PROCEDURE — 97116 GAIT TRAINING THERAPY: CPT

## 2025-08-05 PROCEDURE — 97110 THERAPEUTIC EXERCISES: CPT

## 2025-08-05 PROCEDURE — 97530 THERAPEUTIC ACTIVITIES: CPT

## 2025-08-05 PROCEDURE — 2500000003 HC RX 250 WO HCPCS: Performed by: PHYSICAL MEDICINE & REHABILITATION

## 2025-08-05 PROCEDURE — 1180000000 HC REHAB R&B

## 2025-08-05 PROCEDURE — 97535 SELF CARE MNGMENT TRAINING: CPT

## 2025-08-05 PROCEDURE — 2700000000 HC OXYGEN THERAPY PER DAY

## 2025-08-05 PROCEDURE — 6370000000 HC RX 637 (ALT 250 FOR IP): Performed by: INTERNAL MEDICINE

## 2025-08-05 PROCEDURE — 99232 SBSQ HOSP IP/OBS MODERATE 35: CPT | Performed by: INTERNAL MEDICINE

## 2025-08-05 PROCEDURE — 6360000002 HC RX W HCPCS: Performed by: INTERNAL MEDICINE

## 2025-08-05 RX ADMIN — OXYCODONE 5 MG: 5 TABLET ORAL at 13:31

## 2025-08-05 RX ADMIN — OXYCODONE 5 MG: 5 TABLET ORAL at 05:24

## 2025-08-05 RX ADMIN — METOPROLOL TARTRATE 12.5 MG: 25 TABLET, FILM COATED ORAL at 08:07

## 2025-08-05 RX ADMIN — SODIUM CHLORIDE 500 ML: 900 IRRIGANT IRRIGATION at 13:31

## 2025-08-05 RX ADMIN — ATORVASTATIN CALCIUM 20 MG: 20 TABLET, FILM COATED ORAL at 07:59

## 2025-08-05 RX ADMIN — DOCUSATE SODIUM 100 MG: 100 CAPSULE, LIQUID FILLED ORAL at 07:59

## 2025-08-05 RX ADMIN — DOCUSATE SODIUM 100 MG: 100 CAPSULE, LIQUID FILLED ORAL at 20:52

## 2025-08-05 RX ADMIN — ENOXAPARIN SODIUM 70 MG: 100 INJECTION SUBCUTANEOUS at 07:57

## 2025-08-05 RX ADMIN — ENOXAPARIN SODIUM 70 MG: 100 INJECTION SUBCUTANEOUS at 20:51

## 2025-08-05 RX ADMIN — SENNOSIDES 8.6 MG: 8.6 TABLET, FILM COATED ORAL at 07:59

## 2025-08-05 RX ADMIN — CLOPIDOGREL BISULFATE 75 MG: 75 TABLET, FILM COATED ORAL at 08:07

## 2025-08-05 RX ADMIN — GABAPENTIN 400 MG: 400 CAPSULE ORAL at 07:59

## 2025-08-05 RX ADMIN — GABAPENTIN 400 MG: 400 CAPSULE ORAL at 20:52

## 2025-08-05 RX ADMIN — SERTRALINE HYDROCHLORIDE 50 MG: 50 TABLET ORAL at 07:58

## 2025-08-05 RX ADMIN — GABAPENTIN 400 MG: 400 CAPSULE ORAL at 13:31

## 2025-08-05 RX ADMIN — SENNOSIDES 8.6 MG: 8.6 TABLET, FILM COATED ORAL at 20:52

## 2025-08-05 RX ADMIN — ASPIRIN 81 MG: 81 TABLET, DELAYED RELEASE ORAL at 07:59

## 2025-08-05 RX ADMIN — OXYCODONE 10 MG: 5 TABLET ORAL at 23:12

## 2025-08-05 RX ADMIN — OXYCODONE 10 MG: 5 TABLET ORAL at 18:43

## 2025-08-05 RX ADMIN — PANTOPRAZOLE SODIUM 40 MG: 40 TABLET, DELAYED RELEASE ORAL at 05:24

## 2025-08-05 ASSESSMENT — PAIN DESCRIPTION - DESCRIPTORS
DESCRIPTORS: ACHING
DESCRIPTORS: ACHING
DESCRIPTORS: ACHING;DISCOMFORT
DESCRIPTORS: ACHING;DISCOMFORT

## 2025-08-05 ASSESSMENT — ENCOUNTER SYMPTOMS
COLOR CHANGE: 1
RESPIRATORY NEGATIVE: 1
GASTROINTESTINAL NEGATIVE: 1

## 2025-08-05 ASSESSMENT — PAIN DESCRIPTION - ORIENTATION
ORIENTATION: LEFT

## 2025-08-05 ASSESSMENT — PAIN SCALES - GENERAL
PAINLEVEL_OUTOF10: 8
PAINLEVEL_OUTOF10: 8
PAINLEVEL_OUTOF10: 7
PAINLEVEL_OUTOF10: 7

## 2025-08-05 ASSESSMENT — PAIN DESCRIPTION - LOCATION
LOCATION: ANKLE;FOOT
LOCATION: FOOT
LOCATION: ANKLE;LEG;FOOT
LOCATION: LEG

## 2025-08-06 PROCEDURE — 6360000002 HC RX W HCPCS: Performed by: INTERNAL MEDICINE

## 2025-08-06 PROCEDURE — 97110 THERAPEUTIC EXERCISES: CPT

## 2025-08-06 PROCEDURE — 1180000000 HC REHAB R&B

## 2025-08-06 PROCEDURE — 97535 SELF CARE MNGMENT TRAINING: CPT

## 2025-08-06 PROCEDURE — 99232 SBSQ HOSP IP/OBS MODERATE 35: CPT | Performed by: PHYSICAL MEDICINE & REHABILITATION

## 2025-08-06 PROCEDURE — 6370000000 HC RX 637 (ALT 250 FOR IP): Performed by: INTERNAL MEDICINE

## 2025-08-06 PROCEDURE — 6360000002 HC RX W HCPCS: Performed by: PHYSICAL MEDICINE & REHABILITATION

## 2025-08-06 PROCEDURE — 97530 THERAPEUTIC ACTIVITIES: CPT

## 2025-08-06 PROCEDURE — 2500000003 HC RX 250 WO HCPCS: Performed by: PHYSICAL MEDICINE & REHABILITATION

## 2025-08-06 PROCEDURE — 97116 GAIT TRAINING THERAPY: CPT

## 2025-08-06 PROCEDURE — 6370000000 HC RX 637 (ALT 250 FOR IP): Performed by: PHYSICAL MEDICINE & REHABILITATION

## 2025-08-06 PROCEDURE — 99232 SBSQ HOSP IP/OBS MODERATE 35: CPT | Performed by: INTERNAL MEDICINE

## 2025-08-06 RX ORDER — VITAMIN B COMPLEX
2000 TABLET ORAL
Status: DISCONTINUED | OUTPATIENT
Start: 2025-08-06 | End: 2025-08-08 | Stop reason: HOSPADM

## 2025-08-06 RX ORDER — CYANOCOBALAMIN 1000 UG/ML
1000 INJECTION, SOLUTION INTRAMUSCULAR; SUBCUTANEOUS WEEKLY
Status: DISCONTINUED | OUTPATIENT
Start: 2025-08-06 | End: 2025-08-08 | Stop reason: HOSPADM

## 2025-08-06 RX ORDER — LIDOCAINE 4 G/G
3 PATCH TOPICAL DAILY
Status: DISCONTINUED | OUTPATIENT
Start: 2025-08-06 | End: 2025-08-08 | Stop reason: HOSPADM

## 2025-08-06 RX ORDER — UBIDECARENONE 100 MG
100 CAPSULE ORAL DAILY
Status: DISCONTINUED | OUTPATIENT
Start: 2025-08-06 | End: 2025-08-08 | Stop reason: HOSPADM

## 2025-08-06 RX ADMIN — OXYCODONE 10 MG: 5 TABLET ORAL at 21:25

## 2025-08-06 RX ADMIN — GABAPENTIN 400 MG: 400 CAPSULE ORAL at 10:19

## 2025-08-06 RX ADMIN — DOCUSATE SODIUM 100 MG: 100 CAPSULE, LIQUID FILLED ORAL at 10:19

## 2025-08-06 RX ADMIN — SENNOSIDES 8.6 MG: 8.6 TABLET, FILM COATED ORAL at 10:19

## 2025-08-06 RX ADMIN — ASPIRIN 81 MG: 81 TABLET, DELAYED RELEASE ORAL at 10:19

## 2025-08-06 RX ADMIN — Medication 2000 UNITS: at 17:39

## 2025-08-06 RX ADMIN — OXYCODONE 5 MG: 5 TABLET ORAL at 14:02

## 2025-08-06 RX ADMIN — SODIUM CHLORIDE 500 ML: 900 IRRIGANT IRRIGATION at 10:21

## 2025-08-06 RX ADMIN — Medication 100 MG: at 10:18

## 2025-08-06 RX ADMIN — ATORVASTATIN CALCIUM 20 MG: 20 TABLET, FILM COATED ORAL at 10:19

## 2025-08-06 RX ADMIN — PANTOPRAZOLE SODIUM 40 MG: 40 TABLET, DELAYED RELEASE ORAL at 06:00

## 2025-08-06 RX ADMIN — POLYETHYLENE GLYCOL 3350 17 G: 17 POWDER, FOR SOLUTION ORAL at 10:18

## 2025-08-06 RX ADMIN — METOPROLOL TARTRATE 12.5 MG: 25 TABLET, FILM COATED ORAL at 10:18

## 2025-08-06 RX ADMIN — GABAPENTIN 400 MG: 400 CAPSULE ORAL at 14:02

## 2025-08-06 RX ADMIN — CYANOCOBALAMIN 1000 MCG: 1000 INJECTION, SOLUTION INTRAMUSCULAR; SUBCUTANEOUS at 10:20

## 2025-08-06 RX ADMIN — SERTRALINE HYDROCHLORIDE 50 MG: 50 TABLET ORAL at 10:19

## 2025-08-06 RX ADMIN — ENOXAPARIN SODIUM 70 MG: 100 INJECTION SUBCUTANEOUS at 21:24

## 2025-08-06 RX ADMIN — OXYCODONE 5 MG: 5 TABLET ORAL at 05:59

## 2025-08-06 RX ADMIN — ENOXAPARIN SODIUM 70 MG: 100 INJECTION SUBCUTANEOUS at 10:17

## 2025-08-06 RX ADMIN — CLOPIDOGREL BISULFATE 75 MG: 75 TABLET, FILM COATED ORAL at 10:19

## 2025-08-06 RX ADMIN — GABAPENTIN 400 MG: 400 CAPSULE ORAL at 21:24

## 2025-08-06 ASSESSMENT — ENCOUNTER SYMPTOMS
COLOR CHANGE: 1
RESPIRATORY NEGATIVE: 1
GASTROINTESTINAL NEGATIVE: 1

## 2025-08-06 ASSESSMENT — PAIN DESCRIPTION - LOCATION
LOCATION: LEG
LOCATION: ANKLE;FOOT
LOCATION: LEG;ANKLE

## 2025-08-06 ASSESSMENT — PAIN DESCRIPTION - DESCRIPTORS
DESCRIPTORS: ACHING;DISCOMFORT
DESCRIPTORS: ACHING;DISCOMFORT
DESCRIPTORS: ACHING

## 2025-08-06 ASSESSMENT — PAIN - FUNCTIONAL ASSESSMENT: PAIN_FUNCTIONAL_ASSESSMENT: PREVENTS OR INTERFERES SOME ACTIVE ACTIVITIES AND ADLS

## 2025-08-06 ASSESSMENT — PAIN DESCRIPTION - ORIENTATION
ORIENTATION: LEFT

## 2025-08-06 ASSESSMENT — PAIN SCALES - GENERAL
PAINLEVEL_OUTOF10: 8
PAINLEVEL_OUTOF10: 8
PAINLEVEL_OUTOF10: 6

## 2025-08-07 LAB
ANION GAP SERPL CALCULATED.3IONS-SCNC: 9 MEQ/L (ref 9–15)
BASOPHILS # BLD: 0.1 K/UL (ref 0–0.2)
BASOPHILS NFR BLD: 0.8 %
BUN SERPL-MCNC: 13 MG/DL (ref 8–23)
CALCIUM SERPL-MCNC: 8.3 MG/DL (ref 8.5–9.9)
CHLORIDE SERPL-SCNC: 102 MEQ/L (ref 95–107)
CO2 SERPL-SCNC: 26 MEQ/L (ref 20–31)
CREAT SERPL-MCNC: 0.81 MG/DL (ref 0.7–1.2)
EOSINOPHIL # BLD: 0.3 K/UL (ref 0–0.7)
EOSINOPHIL NFR BLD: 3.4 %
ERYTHROCYTE [DISTWIDTH] IN BLOOD BY AUTOMATED COUNT: 16.7 % (ref 11.5–14.5)
GLUCOSE SERPL-MCNC: 97 MG/DL (ref 70–99)
HCT VFR BLD AUTO: 27.4 % (ref 42–52)
HGB BLD-MCNC: 9 G/DL (ref 14–18)
LYMPHOCYTES # BLD: 2.2 K/UL (ref 1–4.8)
LYMPHOCYTES NFR BLD: 22.2 %
MCH RBC QN AUTO: 31.3 PG (ref 27–31.3)
MCHC RBC AUTO-ENTMCNC: 32.8 % (ref 33–37)
MCV RBC AUTO: 95.1 FL (ref 79–92.2)
MONOCYTES # BLD: 0.9 K/UL (ref 0.2–0.8)
MONOCYTES NFR BLD: 9.5 %
NEUTROPHILS # BLD: 6.1 K/UL (ref 1.4–6.5)
NEUTS SEG NFR BLD: 63.2 %
PLATELET # BLD AUTO: 354 K/UL (ref 130–400)
POTASSIUM SERPL-SCNC: 4.3 MEQ/L (ref 3.4–4.9)
RBC # BLD AUTO: 2.88 M/UL (ref 4.7–6.1)
SODIUM SERPL-SCNC: 137 MEQ/L (ref 135–144)
WBC # BLD AUTO: 9.7 K/UL (ref 4.8–10.8)

## 2025-08-07 PROCEDURE — 97110 THERAPEUTIC EXERCISES: CPT

## 2025-08-07 PROCEDURE — 2500000003 HC RX 250 WO HCPCS: Performed by: PHYSICAL MEDICINE & REHABILITATION

## 2025-08-07 PROCEDURE — 85025 COMPLETE CBC W/AUTO DIFF WBC: CPT

## 2025-08-07 PROCEDURE — 97535 SELF CARE MNGMENT TRAINING: CPT

## 2025-08-07 PROCEDURE — 80048 BASIC METABOLIC PNL TOTAL CA: CPT

## 2025-08-07 PROCEDURE — 36415 COLL VENOUS BLD VENIPUNCTURE: CPT

## 2025-08-07 PROCEDURE — 6370000000 HC RX 637 (ALT 250 FOR IP): Performed by: INTERNAL MEDICINE

## 2025-08-07 PROCEDURE — 97530 THERAPEUTIC ACTIVITIES: CPT

## 2025-08-07 PROCEDURE — 97112 NEUROMUSCULAR REEDUCATION: CPT

## 2025-08-07 PROCEDURE — 6370000000 HC RX 637 (ALT 250 FOR IP): Performed by: PHYSICAL MEDICINE & REHABILITATION

## 2025-08-07 PROCEDURE — 1180000000 HC REHAB R&B

## 2025-08-07 PROCEDURE — 99233 SBSQ HOSP IP/OBS HIGH 50: CPT | Performed by: PHYSICAL MEDICINE & REHABILITATION

## 2025-08-07 PROCEDURE — 6360000002 HC RX W HCPCS: Performed by: INTERNAL MEDICINE

## 2025-08-07 PROCEDURE — 97116 GAIT TRAINING THERAPY: CPT

## 2025-08-07 RX ORDER — METOPROLOL TARTRATE 25 MG/1
12.5 TABLET, FILM COATED ORAL 2 TIMES DAILY
Qty: 60 TABLET | Refills: 3 | Status: SHIPPED | OUTPATIENT
Start: 2025-08-07

## 2025-08-07 RX ORDER — UBIDECARENONE 100 MG
100 CAPSULE ORAL DAILY
Qty: 120 CAPSULE | Refills: 5 | Status: SHIPPED | OUTPATIENT
Start: 2025-08-07

## 2025-08-07 RX ORDER — OXYCODONE HYDROCHLORIDE 10 MG/1
10 TABLET ORAL EVERY 4 HOURS PRN
Qty: 40 TABLET | Refills: 0 | Status: SHIPPED | OUTPATIENT
Start: 2025-08-07 | End: 2025-08-14

## 2025-08-07 RX ORDER — NICOTINE 21 MG/24HR
1 PATCH, TRANSDERMAL 24 HOURS TRANSDERMAL DAILY
Qty: 30 PATCH | Refills: 3 | Status: SHIPPED | OUTPATIENT
Start: 2025-08-07

## 2025-08-07 RX ADMIN — GABAPENTIN 400 MG: 400 CAPSULE ORAL at 21:11

## 2025-08-07 RX ADMIN — SODIUM CHLORIDE 500 ML: 900 IRRIGANT IRRIGATION at 14:07

## 2025-08-07 RX ADMIN — OXYCODONE 10 MG: 5 TABLET ORAL at 18:54

## 2025-08-07 RX ADMIN — SENNOSIDES 8.6 MG: 8.6 TABLET, FILM COATED ORAL at 10:34

## 2025-08-07 RX ADMIN — Medication 2000 UNITS: at 17:15

## 2025-08-07 RX ADMIN — OXYCODONE 5 MG: 5 TABLET ORAL at 10:49

## 2025-08-07 RX ADMIN — GABAPENTIN 400 MG: 400 CAPSULE ORAL at 10:33

## 2025-08-07 RX ADMIN — SERTRALINE HYDROCHLORIDE 50 MG: 50 TABLET ORAL at 10:34

## 2025-08-07 RX ADMIN — OXYCODONE 5 MG: 5 TABLET ORAL at 05:15

## 2025-08-07 RX ADMIN — GABAPENTIN 400 MG: 400 CAPSULE ORAL at 14:07

## 2025-08-07 RX ADMIN — CLOPIDOGREL BISULFATE 75 MG: 75 TABLET, FILM COATED ORAL at 10:34

## 2025-08-07 RX ADMIN — ASPIRIN 81 MG: 81 TABLET, DELAYED RELEASE ORAL at 10:34

## 2025-08-07 RX ADMIN — ENOXAPARIN SODIUM 70 MG: 100 INJECTION SUBCUTANEOUS at 10:34

## 2025-08-07 RX ADMIN — SENNOSIDES 8.6 MG: 8.6 TABLET, FILM COATED ORAL at 21:11

## 2025-08-07 RX ADMIN — METOPROLOL TARTRATE 12.5 MG: 25 TABLET, FILM COATED ORAL at 21:11

## 2025-08-07 RX ADMIN — PANTOPRAZOLE SODIUM 40 MG: 40 TABLET, DELAYED RELEASE ORAL at 05:15

## 2025-08-07 RX ADMIN — METOPROLOL TARTRATE 12.5 MG: 25 TABLET, FILM COATED ORAL at 10:33

## 2025-08-07 RX ADMIN — ATORVASTATIN CALCIUM 20 MG: 20 TABLET, FILM COATED ORAL at 10:34

## 2025-08-07 RX ADMIN — ENOXAPARIN SODIUM 70 MG: 100 INJECTION SUBCUTANEOUS at 21:11

## 2025-08-07 RX ADMIN — Medication 100 MG: at 10:33

## 2025-08-07 RX ADMIN — DOCUSATE SODIUM 100 MG: 100 CAPSULE, LIQUID FILLED ORAL at 21:11

## 2025-08-07 RX ADMIN — POLYETHYLENE GLYCOL 3350 17 G: 17 POWDER, FOR SOLUTION ORAL at 21:11

## 2025-08-07 RX ADMIN — DOCUSATE SODIUM 100 MG: 100 CAPSULE, LIQUID FILLED ORAL at 10:34

## 2025-08-07 ASSESSMENT — PAIN DESCRIPTION - ORIENTATION
ORIENTATION: LEFT

## 2025-08-07 ASSESSMENT — ENCOUNTER SYMPTOMS
COLOR CHANGE: 1
RESPIRATORY NEGATIVE: 1
GASTROINTESTINAL NEGATIVE: 1

## 2025-08-07 ASSESSMENT — PAIN DESCRIPTION - DESCRIPTORS
DESCRIPTORS: ACHING
DESCRIPTORS: SHARP
DESCRIPTORS: ACHING;DISCOMFORT
DESCRIPTORS: SHARP

## 2025-08-07 ASSESSMENT — PAIN DESCRIPTION - LOCATION
LOCATION: LEG
LOCATION: ANKLE;FOOT

## 2025-08-07 ASSESSMENT — PAIN SCALES - GENERAL
PAINLEVEL_OUTOF10: 5
PAINLEVEL_OUTOF10: 8
PAINLEVEL_OUTOF10: 8
PAINLEVEL_OUTOF10: 7

## 2025-08-08 VITALS
TEMPERATURE: 98.8 F | DIASTOLIC BLOOD PRESSURE: 76 MMHG | SYSTOLIC BLOOD PRESSURE: 125 MMHG | HEIGHT: 69 IN | OXYGEN SATURATION: 98 % | HEART RATE: 58 BPM | RESPIRATION RATE: 18 BRPM | WEIGHT: 143.08 LBS | BODY MASS INDEX: 21.19 KG/M2

## 2025-08-08 PROCEDURE — 97110 THERAPEUTIC EXERCISES: CPT

## 2025-08-08 PROCEDURE — 2500000003 HC RX 250 WO HCPCS: Performed by: PHYSICAL MEDICINE & REHABILITATION

## 2025-08-08 PROCEDURE — 6370000000 HC RX 637 (ALT 250 FOR IP): Performed by: PHYSICAL MEDICINE & REHABILITATION

## 2025-08-08 PROCEDURE — 6370000000 HC RX 637 (ALT 250 FOR IP): Performed by: INTERNAL MEDICINE

## 2025-08-08 PROCEDURE — 6360000002 HC RX W HCPCS: Performed by: INTERNAL MEDICINE

## 2025-08-08 PROCEDURE — 99239 HOSP IP/OBS DSCHRG MGMT >30: CPT | Performed by: PHYSICAL MEDICINE & REHABILITATION

## 2025-08-08 PROCEDURE — 97530 THERAPEUTIC ACTIVITIES: CPT

## 2025-08-08 PROCEDURE — 97116 GAIT TRAINING THERAPY: CPT

## 2025-08-08 RX ORDER — ENOXAPARIN SODIUM 100 MG/ML
1 INJECTION SUBCUTANEOUS 2 TIMES DAILY
Qty: 48 ML | Refills: 0 | Status: SHIPPED | OUTPATIENT
Start: 2025-08-08 | End: 2025-09-07

## 2025-08-08 RX ORDER — ISOPROPYL ALCOHOL 70 ML/100ML
1 SWAB TOPICAL 2 TIMES DAILY
Qty: 1 EACH | Refills: 3 | Status: SHIPPED | OUTPATIENT
Start: 2025-08-08

## 2025-08-08 RX ADMIN — SERTRALINE HYDROCHLORIDE 50 MG: 50 TABLET ORAL at 08:57

## 2025-08-08 RX ADMIN — Medication 100 MG: at 08:58

## 2025-08-08 RX ADMIN — CLOPIDOGREL BISULFATE 75 MG: 75 TABLET, FILM COATED ORAL at 08:57

## 2025-08-08 RX ADMIN — PANTOPRAZOLE SODIUM 40 MG: 40 TABLET, DELAYED RELEASE ORAL at 06:29

## 2025-08-08 RX ADMIN — SODIUM CHLORIDE 500 ML: 900 IRRIGANT IRRIGATION at 09:00

## 2025-08-08 RX ADMIN — GABAPENTIN 400 MG: 400 CAPSULE ORAL at 13:43

## 2025-08-08 RX ADMIN — METOPROLOL TARTRATE 12.5 MG: 25 TABLET, FILM COATED ORAL at 08:57

## 2025-08-08 RX ADMIN — DOCUSATE SODIUM 100 MG: 100 CAPSULE, LIQUID FILLED ORAL at 08:57

## 2025-08-08 RX ADMIN — ENOXAPARIN SODIUM 70 MG: 100 INJECTION SUBCUTANEOUS at 08:56

## 2025-08-08 RX ADMIN — SENNOSIDES 8.6 MG: 8.6 TABLET, FILM COATED ORAL at 08:57

## 2025-08-08 RX ADMIN — ATORVASTATIN CALCIUM 20 MG: 20 TABLET, FILM COATED ORAL at 08:57

## 2025-08-08 RX ADMIN — POLYETHYLENE GLYCOL 3350 17 G: 17 POWDER, FOR SOLUTION ORAL at 08:57

## 2025-08-08 RX ADMIN — GABAPENTIN 400 MG: 400 CAPSULE ORAL at 08:57

## 2025-08-08 RX ADMIN — OXYCODONE 5 MG: 5 TABLET ORAL at 06:29

## 2025-08-08 RX ADMIN — ASPIRIN 81 MG: 81 TABLET, DELAYED RELEASE ORAL at 08:58

## 2025-08-08 ASSESSMENT — PAIN SCALES - GENERAL
PAINLEVEL_OUTOF10: 6
PAINLEVEL_OUTOF10: 7

## 2025-08-08 ASSESSMENT — PAIN DESCRIPTION - ORIENTATION
ORIENTATION: LEFT
ORIENTATION: LEFT

## 2025-08-08 ASSESSMENT — PAIN DESCRIPTION - LOCATION
LOCATION: LEG
LOCATION: LEG

## 2025-08-08 ASSESSMENT — PAIN DESCRIPTION - DESCRIPTORS
DESCRIPTORS: NUMBNESS
DESCRIPTORS: THROBBING;NUMBNESS

## 2025-08-11 ENCOUNTER — TELEPHONE (OUTPATIENT)
Dept: PRIMARY CARE CLINIC | Age: 65
End: 2025-08-11

## 2025-08-12 ENCOUNTER — OFFICE VISIT (OUTPATIENT)
Dept: PRIMARY CARE CLINIC | Age: 65
End: 2025-08-12
Payer: COMMERCIAL

## 2025-08-12 VITALS
TEMPERATURE: 98 F | SYSTOLIC BLOOD PRESSURE: 110 MMHG | OXYGEN SATURATION: 98 % | WEIGHT: 143 LBS | HEIGHT: 69 IN | BODY MASS INDEX: 21.18 KG/M2 | DIASTOLIC BLOOD PRESSURE: 68 MMHG | HEART RATE: 82 BPM

## 2025-08-12 DIAGNOSIS — I70.209: ICD-10-CM

## 2025-08-12 DIAGNOSIS — Z72.0 TOBACCO USE: ICD-10-CM

## 2025-08-12 DIAGNOSIS — I99.8 ISCHEMIA OF FOOT: ICD-10-CM

## 2025-08-12 DIAGNOSIS — Z09 HOSPITAL DISCHARGE FOLLOW-UP: Primary | ICD-10-CM

## 2025-08-12 PROCEDURE — 1111F DSCHRG MED/CURRENT MED MERGE: CPT | Performed by: STUDENT IN AN ORGANIZED HEALTH CARE EDUCATION/TRAINING PROGRAM

## 2025-08-12 PROCEDURE — 99215 OFFICE O/P EST HI 40 MIN: CPT | Performed by: STUDENT IN AN ORGANIZED HEALTH CARE EDUCATION/TRAINING PROGRAM

## 2025-08-15 ENCOUNTER — HOSPITAL ENCOUNTER (OUTPATIENT)
Dept: WOUND CARE | Age: 65
Discharge: HOME | End: 2025-08-15
Attending: SURGERY
Payer: COMMERCIAL

## 2025-08-15 VITALS
SYSTOLIC BLOOD PRESSURE: 104 MMHG | TEMPERATURE: 97.4 F | DIASTOLIC BLOOD PRESSURE: 76 MMHG | RESPIRATION RATE: 16 BRPM | HEART RATE: 59 BPM

## 2025-08-15 DIAGNOSIS — Z98.890 H/O FASCIOTOMY: ICD-10-CM

## 2025-08-15 DIAGNOSIS — L97.922 CHRONIC ULCER OF LEFT LEG WITH FAT LAYER EXPOSED (HCC): ICD-10-CM

## 2025-08-15 DIAGNOSIS — L97.522 CHRONIC FOOT ULCER WITH FAT LAYER EXPOSED, LEFT (HCC): Primary | ICD-10-CM

## 2025-08-15 PROBLEM — L97.912 ULCER OF RIGHT LOWER EXTREMITY WITH FAT LAYER EXPOSED (HCC): Status: ACTIVE | Noted: 2024-01-17

## 2025-08-15 PROCEDURE — 11042 DBRDMT SUBQ TIS 1ST 20SQCM/<: CPT

## 2025-08-15 PROCEDURE — 11045 DBRDMT SUBQ TISS EACH ADDL: CPT

## 2025-08-15 RX ORDER — LIDOCAINE 50 MG/G
OINTMENT TOPICAL ONCE
OUTPATIENT
Start: 2025-08-15 | End: 2025-08-15

## 2025-08-15 RX ORDER — SODIUM CHLOR/HYPOCHLOROUS ACID 0.033 %
SOLUTION, IRRIGATION IRRIGATION ONCE
OUTPATIENT
Start: 2025-08-15 | End: 2025-08-15

## 2025-08-15 RX ORDER — LIDOCAINE HYDROCHLORIDE 20 MG/ML
JELLY TOPICAL ONCE
OUTPATIENT
Start: 2025-08-15 | End: 2025-08-15

## 2025-08-15 RX ORDER — SILVER SULFADIAZINE 10 MG/G
CREAM TOPICAL ONCE
OUTPATIENT
Start: 2025-08-15 | End: 2025-08-15

## 2025-08-15 RX ORDER — GENTAMICIN SULFATE 1 MG/G
OINTMENT TOPICAL ONCE
OUTPATIENT
Start: 2025-08-15 | End: 2025-08-15

## 2025-08-15 RX ORDER — CLOBETASOL PROPIONATE 0.5 MG/G
OINTMENT TOPICAL ONCE
OUTPATIENT
Start: 2025-08-15 | End: 2025-08-15

## 2025-08-15 RX ORDER — LIDOCAINE HYDROCHLORIDE 40 MG/ML
SOLUTION TOPICAL ONCE
OUTPATIENT
Start: 2025-08-15 | End: 2025-08-15

## 2025-08-15 RX ORDER — GINSENG 100 MG
CAPSULE ORAL ONCE
OUTPATIENT
Start: 2025-08-15 | End: 2025-08-15

## 2025-08-15 RX ORDER — MUPIROCIN 2 %
OINTMENT (GRAM) TOPICAL ONCE
OUTPATIENT
Start: 2025-08-15 | End: 2025-08-15

## 2025-08-15 RX ORDER — LIDOCAINE 40 MG/G
CREAM TOPICAL ONCE
OUTPATIENT
Start: 2025-08-15 | End: 2025-08-15

## 2025-08-15 RX ORDER — BACITRACIN ZINC AND POLYMYXIN B SULFATE 500; 1000 [USP'U]/G; [USP'U]/G
OINTMENT TOPICAL ONCE
OUTPATIENT
Start: 2025-08-15 | End: 2025-08-15

## 2025-08-15 RX ORDER — TRIAMCINOLONE ACETONIDE 1 MG/G
OINTMENT TOPICAL ONCE
OUTPATIENT
Start: 2025-08-15 | End: 2025-08-15

## 2025-08-15 RX ORDER — NEOMYCIN/BACITRACIN/POLYMYXINB 3.5-400-5K
OINTMENT (GRAM) TOPICAL ONCE
OUTPATIENT
Start: 2025-08-15 | End: 2025-08-15

## 2025-08-15 RX ORDER — BETAMETHASONE DIPROPIONATE 0.5 MG/G
CREAM TOPICAL ONCE
OUTPATIENT
Start: 2025-08-15 | End: 2025-08-15

## 2025-08-15 ASSESSMENT — PAIN SCALES - GENERAL: PAINLEVEL_OUTOF10: 8

## 2025-08-15 ASSESSMENT — PAIN DESCRIPTION - LOCATION: LOCATION: LEG

## 2025-08-15 ASSESSMENT — PAIN DESCRIPTION - ORIENTATION: ORIENTATION: LEFT

## 2025-08-15 ASSESSMENT — PAIN DESCRIPTION - FREQUENCY: FREQUENCY: INTERMITTENT

## 2025-08-15 ASSESSMENT — PAIN DESCRIPTION - DESCRIPTORS: DESCRIPTORS: SHARP;PRESSURE

## 2025-08-18 ENCOUNTER — TELEPHONE (OUTPATIENT)
Dept: PRIMARY CARE CLINIC | Age: 65
End: 2025-08-18

## 2025-08-22 ENCOUNTER — HOSPITAL ENCOUNTER (OUTPATIENT)
Dept: WOUND CARE | Age: 65
Discharge: HOME | End: 2025-08-22
Attending: SURGERY
Payer: COMMERCIAL

## 2025-08-22 VITALS
TEMPERATURE: 98.1 F | HEART RATE: 53 BPM | SYSTOLIC BLOOD PRESSURE: 123 MMHG | DIASTOLIC BLOOD PRESSURE: 81 MMHG | RESPIRATION RATE: 16 BRPM

## 2025-08-22 DIAGNOSIS — I70.209: ICD-10-CM

## 2025-08-22 DIAGNOSIS — L97.912 ULCER OF RIGHT LOWER EXTREMITY WITH FAT LAYER EXPOSED (HCC): Primary | ICD-10-CM

## 2025-08-22 PROCEDURE — 6370000000 HC RX 637 (ALT 250 FOR IP): Performed by: SURGERY

## 2025-08-22 PROCEDURE — 15273 SKIN SUB GRFT T/ARM/LG CHILD: CPT

## 2025-08-22 RX ORDER — LIDOCAINE HYDROCHLORIDE 20 MG/ML
JELLY TOPICAL ONCE
OUTPATIENT
Start: 2025-08-22 | End: 2025-08-22

## 2025-08-22 RX ORDER — TRIAMCINOLONE ACETONIDE 1 MG/G
OINTMENT TOPICAL ONCE
OUTPATIENT
Start: 2025-08-22 | End: 2025-08-22

## 2025-08-22 RX ORDER — TRAMADOL HYDROCHLORIDE 50 MG/1
50 TABLET ORAL EVERY 6 HOURS PRN
Qty: 28 TABLET | Refills: 0 | Status: SHIPPED | OUTPATIENT
Start: 2025-08-22 | End: 2025-08-29

## 2025-08-22 RX ORDER — LIDOCAINE HYDROCHLORIDE 40 MG/ML
SOLUTION TOPICAL ONCE
Status: DISCONTINUED | OUTPATIENT
Start: 2025-08-22 | End: 2025-08-23 | Stop reason: HOSPADM

## 2025-08-22 RX ORDER — LIDOCAINE 50 MG/G
OINTMENT TOPICAL ONCE
OUTPATIENT
Start: 2025-08-22 | End: 2025-08-22

## 2025-08-22 RX ORDER — SODIUM CHLOR/HYPOCHLOROUS ACID 0.033 %
SOLUTION, IRRIGATION IRRIGATION ONCE
OUTPATIENT
Start: 2025-08-22 | End: 2025-08-22

## 2025-08-22 RX ORDER — NEOMYCIN/BACITRACIN/POLYMYXINB 3.5-400-5K
OINTMENT (GRAM) TOPICAL ONCE
OUTPATIENT
Start: 2025-08-22 | End: 2025-08-22

## 2025-08-22 RX ORDER — CLOBETASOL PROPIONATE 0.5 MG/G
OINTMENT TOPICAL ONCE
OUTPATIENT
Start: 2025-08-22 | End: 2025-08-22

## 2025-08-22 RX ORDER — MUPIROCIN 2 %
OINTMENT (GRAM) TOPICAL ONCE
OUTPATIENT
Start: 2025-08-22 | End: 2025-08-22

## 2025-08-22 RX ORDER — GENTAMICIN SULFATE 1 MG/G
OINTMENT TOPICAL ONCE
OUTPATIENT
Start: 2025-08-22 | End: 2025-08-22

## 2025-08-22 RX ORDER — GINSENG 100 MG
CAPSULE ORAL ONCE
OUTPATIENT
Start: 2025-08-22 | End: 2025-08-22

## 2025-08-22 RX ORDER — SILVER SULFADIAZINE 10 MG/G
CREAM TOPICAL ONCE
OUTPATIENT
Start: 2025-08-22 | End: 2025-08-22

## 2025-08-22 RX ORDER — LIDOCAINE 40 MG/G
CREAM TOPICAL ONCE
OUTPATIENT
Start: 2025-08-22 | End: 2025-08-22

## 2025-08-22 RX ORDER — LIDOCAINE 40 MG/G
CREAM TOPICAL ONCE
Status: COMPLETED | OUTPATIENT
Start: 2025-08-22 | End: 2025-08-22

## 2025-08-22 RX ORDER — LIDOCAINE HYDROCHLORIDE 40 MG/ML
SOLUTION TOPICAL ONCE
OUTPATIENT
Start: 2025-08-22 | End: 2025-08-22

## 2025-08-22 RX ORDER — BACITRACIN ZINC AND POLYMYXIN B SULFATE 500; 1000 [USP'U]/G; [USP'U]/G
OINTMENT TOPICAL ONCE
OUTPATIENT
Start: 2025-08-22 | End: 2025-08-22

## 2025-08-22 RX ORDER — BETAMETHASONE DIPROPIONATE 0.5 MG/G
CREAM TOPICAL ONCE
OUTPATIENT
Start: 2025-08-22 | End: 2025-08-22

## 2025-08-22 RX ADMIN — LIDOCAINE 4%: 4 CREAM TOPICAL at 13:43

## 2025-08-22 ASSESSMENT — PAIN SCALES - GENERAL: PAINLEVEL_OUTOF10: 7

## 2025-08-22 ASSESSMENT — PAIN DESCRIPTION - LOCATION: LOCATION: LEG

## 2025-08-22 ASSESSMENT — PAIN DESCRIPTION - DESCRIPTORS: DESCRIPTORS: SHARP

## 2025-08-22 ASSESSMENT — PAIN DESCRIPTION - ORIENTATION: ORIENTATION: LEFT

## 2025-08-29 ENCOUNTER — HOSPITAL ENCOUNTER (OUTPATIENT)
Dept: WOUND CARE | Age: 65
Discharge: HOME | End: 2025-08-29
Attending: SURGERY
Payer: COMMERCIAL

## 2025-08-29 VITALS
DIASTOLIC BLOOD PRESSURE: 80 MMHG | SYSTOLIC BLOOD PRESSURE: 116 MMHG | HEART RATE: 54 BPM | TEMPERATURE: 96.6 F | RESPIRATION RATE: 18 BRPM

## 2025-08-29 DIAGNOSIS — L97.912 ULCER OF RIGHT LOWER EXTREMITY WITH FAT LAYER EXPOSED (HCC): Primary | ICD-10-CM

## 2025-08-29 PROCEDURE — 11042 DBRDMT SUBQ TIS 1ST 20SQCM/<: CPT

## 2025-08-29 RX ORDER — GINSENG 100 MG
CAPSULE ORAL ONCE
OUTPATIENT
Start: 2025-08-29 | End: 2025-08-29

## 2025-08-29 RX ORDER — CLOBETASOL PROPIONATE 0.5 MG/G
OINTMENT TOPICAL ONCE
OUTPATIENT
Start: 2025-08-29 | End: 2025-08-29

## 2025-08-29 RX ORDER — GENTAMICIN SULFATE 1 MG/G
OINTMENT TOPICAL ONCE
OUTPATIENT
Start: 2025-08-29 | End: 2025-08-29

## 2025-08-29 RX ORDER — SODIUM CHLOR/HYPOCHLOROUS ACID 0.033 %
SOLUTION, IRRIGATION IRRIGATION ONCE
OUTPATIENT
Start: 2025-08-29 | End: 2025-08-29

## 2025-08-29 RX ORDER — BACITRACIN ZINC AND POLYMYXIN B SULFATE 500; 1000 [USP'U]/G; [USP'U]/G
OINTMENT TOPICAL ONCE
OUTPATIENT
Start: 2025-08-29 | End: 2025-08-29

## 2025-08-29 RX ORDER — LIDOCAINE HYDROCHLORIDE 20 MG/ML
JELLY TOPICAL ONCE
OUTPATIENT
Start: 2025-08-29 | End: 2025-08-29

## 2025-08-29 RX ORDER — BETAMETHASONE DIPROPIONATE 0.5 MG/G
CREAM TOPICAL ONCE
OUTPATIENT
Start: 2025-08-29 | End: 2025-08-29

## 2025-08-29 RX ORDER — TRIAMCINOLONE ACETONIDE 1 MG/G
OINTMENT TOPICAL ONCE
OUTPATIENT
Start: 2025-08-29 | End: 2025-08-29

## 2025-08-29 RX ORDER — LIDOCAINE 50 MG/G
OINTMENT TOPICAL ONCE
OUTPATIENT
Start: 2025-08-29 | End: 2025-08-29

## 2025-08-29 RX ORDER — LIDOCAINE HYDROCHLORIDE 20 MG/ML
JELLY TOPICAL ONCE
Status: DISCONTINUED | OUTPATIENT
Start: 2025-08-29 | End: 2025-08-30 | Stop reason: HOSPADM

## 2025-08-29 RX ORDER — LIDOCAINE 40 MG/G
CREAM TOPICAL ONCE
OUTPATIENT
Start: 2025-08-29 | End: 2025-08-29

## 2025-08-29 RX ORDER — MUPIROCIN 2 %
OINTMENT (GRAM) TOPICAL ONCE
OUTPATIENT
Start: 2025-08-29 | End: 2025-08-29

## 2025-08-29 RX ORDER — LIDOCAINE HYDROCHLORIDE 40 MG/ML
SOLUTION TOPICAL ONCE
OUTPATIENT
Start: 2025-08-29 | End: 2025-08-29

## 2025-08-29 RX ORDER — NEOMYCIN/BACITRACIN/POLYMYXINB 3.5-400-5K
OINTMENT (GRAM) TOPICAL ONCE
OUTPATIENT
Start: 2025-08-29 | End: 2025-08-29

## 2025-08-29 RX ORDER — SILVER SULFADIAZINE 10 MG/G
CREAM TOPICAL ONCE
OUTPATIENT
Start: 2025-08-29 | End: 2025-08-29

## 2025-08-29 ASSESSMENT — PAIN SCALES - GENERAL: PAINLEVEL_OUTOF10: 8

## 2025-08-29 ASSESSMENT — PAIN DESCRIPTION - LOCATION: LOCATION: LEG

## 2025-08-29 ASSESSMENT — PAIN DESCRIPTION - ORIENTATION: ORIENTATION: LEFT

## 2025-09-05 ENCOUNTER — HOSPITAL ENCOUNTER (OUTPATIENT)
Dept: WOUND CARE | Age: 65
Discharge: HOME | End: 2025-09-05
Attending: SURGERY
Payer: COMMERCIAL

## 2025-09-05 VITALS
TEMPERATURE: 96.7 F | SYSTOLIC BLOOD PRESSURE: 120 MMHG | DIASTOLIC BLOOD PRESSURE: 70 MMHG | RESPIRATION RATE: 16 BRPM | HEART RATE: 48 BPM

## 2025-09-05 DIAGNOSIS — L97.912 ULCER OF RIGHT LOWER EXTREMITY WITH FAT LAYER EXPOSED (HCC): Primary | ICD-10-CM

## 2025-09-05 PROCEDURE — 11042 DBRDMT SUBQ TIS 1ST 20SQCM/<: CPT | Performed by: SURGERY

## 2025-09-05 PROCEDURE — 6370000000 HC RX 637 (ALT 250 FOR IP): Performed by: SURGERY

## 2025-09-05 PROCEDURE — 11042 DBRDMT SUBQ TIS 1ST 20SQCM/<: CPT

## 2025-09-05 RX ORDER — TRIAMCINOLONE ACETONIDE 1 MG/G
OINTMENT TOPICAL ONCE
OUTPATIENT
Start: 2025-09-05 | End: 2025-09-05

## 2025-09-05 RX ORDER — BETAMETHASONE DIPROPIONATE 0.5 MG/G
CREAM TOPICAL ONCE
OUTPATIENT
Start: 2025-09-05 | End: 2025-09-05

## 2025-09-05 RX ORDER — NEOMYCIN/BACITRACIN/POLYMYXINB 3.5-400-5K
OINTMENT (GRAM) TOPICAL ONCE
OUTPATIENT
Start: 2025-09-05 | End: 2025-09-05

## 2025-09-05 RX ORDER — MUPIROCIN 2 %
OINTMENT (GRAM) TOPICAL ONCE
OUTPATIENT
Start: 2025-09-05 | End: 2025-09-05

## 2025-09-05 RX ORDER — SILVER SULFADIAZINE 10 MG/G
CREAM TOPICAL ONCE
OUTPATIENT
Start: 2025-09-05 | End: 2025-09-05

## 2025-09-05 RX ORDER — SODIUM CHLOR/HYPOCHLOROUS ACID 0.033 %
SOLUTION, IRRIGATION IRRIGATION ONCE
OUTPATIENT
Start: 2025-09-05 | End: 2025-09-05

## 2025-09-05 RX ORDER — LIDOCAINE HYDROCHLORIDE 20 MG/ML
JELLY TOPICAL ONCE
OUTPATIENT
Start: 2025-09-05 | End: 2025-09-05

## 2025-09-05 RX ORDER — LIDOCAINE 40 MG/G
CREAM TOPICAL ONCE
OUTPATIENT
Start: 2025-09-05 | End: 2025-09-05

## 2025-09-05 RX ORDER — BACITRACIN ZINC AND POLYMYXIN B SULFATE 500; 1000 [USP'U]/G; [USP'U]/G
OINTMENT TOPICAL ONCE
OUTPATIENT
Start: 2025-09-05 | End: 2025-09-05

## 2025-09-05 RX ORDER — LIDOCAINE HYDROCHLORIDE 40 MG/ML
SOLUTION TOPICAL ONCE
OUTPATIENT
Start: 2025-09-05 | End: 2025-09-05

## 2025-09-05 RX ORDER — LIDOCAINE 50 MG/G
OINTMENT TOPICAL ONCE
OUTPATIENT
Start: 2025-09-05 | End: 2025-09-05

## 2025-09-05 RX ORDER — GENTAMICIN SULFATE 1 MG/G
OINTMENT TOPICAL ONCE
OUTPATIENT
Start: 2025-09-05 | End: 2025-09-05

## 2025-09-05 RX ORDER — LIDOCAINE 40 MG/G
CREAM TOPICAL ONCE
Status: COMPLETED | OUTPATIENT
Start: 2025-09-05 | End: 2025-09-05

## 2025-09-05 RX ORDER — GINSENG 100 MG
CAPSULE ORAL ONCE
OUTPATIENT
Start: 2025-09-05 | End: 2025-09-05

## 2025-09-05 RX ORDER — CLOBETASOL PROPIONATE 0.5 MG/G
OINTMENT TOPICAL ONCE
OUTPATIENT
Start: 2025-09-05 | End: 2025-09-05

## 2025-09-05 RX ADMIN — LIDOCAINE 4%: 4 CREAM TOPICAL at 15:25

## 2025-09-05 ASSESSMENT — PAIN DESCRIPTION - FREQUENCY: FREQUENCY: INTERMITTENT

## 2025-09-05 ASSESSMENT — PAIN DESCRIPTION - ORIENTATION: ORIENTATION: LEFT

## 2025-09-05 ASSESSMENT — PAIN DESCRIPTION - LOCATION: LOCATION: LEG;FOOT

## 2025-09-05 ASSESSMENT — PAIN DESCRIPTION - DESCRIPTORS: DESCRIPTORS: SHARP

## 2025-09-05 ASSESSMENT — PAIN SCALES - GENERAL: PAINLEVEL_OUTOF10: 7

## (undated) PROCEDURE — 04CY3ZZ EXTIRPATION OF MATTER FROM LOWER ARTERY, PERCUTANEOUS APPROACH: ICD-10-PCS

## (undated) PROCEDURE — 041L0JL BYPASS LEFT FEMORAL ARTERY TO POPLITEAL ARTERY WITH SYNTHETIC SUBSTITUTE, OPEN APPROACH: ICD-10-PCS

## (undated) PROCEDURE — 04CN3ZZ EXTIRPATION OF MATTER FROM LEFT POPLITEAL ARTERY, PERCUTANEOUS APPROACH: ICD-10-PCS

## (undated) PROCEDURE — 0KNT0ZZ RELEASE LEFT LOWER LEG MUSCLE, OPEN APPROACH: ICD-10-PCS

## (undated) PROCEDURE — B44GZZ3 ULTRASONOGRAPHY OF LEFT LOWER EXTREMITY ARTERIES, INTRAVASCULAR: ICD-10-PCS

## (undated) PROCEDURE — X2KJ3E9 BYPASS LEFT FEMORAL ARTERY USING CONDUIT THROUGH FEMORAL VEIN TO POPLITEAL ARTERY, PERCUTANEOUS APPROACH, NEW TECHNOLOGY GROUP 9: ICD-10-PCS

## (undated) PROCEDURE — B41D1ZZ FLUOROSCOPY OF AORTA AND BILATERAL LOWER EXTREMITY ARTERIES USING LOW OSMOLAR CONTRAST: ICD-10-PCS

## (undated) DEVICE — SUTURE PROL SZ 5-0 L36IN NONABSORBABLE BLU L13MM C-1 3/8 8720H

## (undated) DEVICE — CATHETER DIAG 5FR L65CM ID0.052IN 20 BND 5 PRT PGTL VES SZ

## (undated) DEVICE — GEL US 20GM NONIRRITATING OVERWRAPPED FILE PCH TRNSMIT

## (undated) DEVICE — 3M™ IOBAN™ 2 ANTIMICROBIAL INCISE DRAPE 6650EZ: Brand: IOBAN™ 2

## (undated) DEVICE — GUIDEWIRE WITH ICE™ HYDROPHILIC COATING: Brand: SAVION FLX™

## (undated) DEVICE — BANDAGE,GAUZE,BULKEE II,4.5"X4.1YD,STRL: Brand: MEDLINE

## (undated) DEVICE — 4-PORT MANIFOLD: Brand: NEPTUNE 2

## (undated) DEVICE — PAD,ABDOMINAL,8"X10",STERILE,LF,1/PK: Brand: MEDLINE

## (undated) DEVICE — BLADE,CARBON-STEEL,11,STRL,DISPOSABLE,TB: Brand: MEDLINE

## (undated) DEVICE — GOWN,AURORA,NONREINFORCED,LARGE: Brand: MEDLINE

## (undated) DEVICE — TOWEL,OR,DSP,ST,BLUE,STD,4/PK,20PK/CS: Brand: MEDLINE

## (undated) DEVICE — BOOT,SUTURE,STANDARD,YELLOW-IN-BLUE: Brand: MEDLINE

## (undated) DEVICE — LABEL MED MINI W/ MARKER

## (undated) DEVICE — Device

## (undated) DEVICE — LOOP VES L12IN DIA0.066IN WHT SIL THN WALL AIR CUSH

## (undated) DEVICE — TABLE PROC MAYO 0.75X19-1/8X12-5/8 IN ADJ SS

## (undated) DEVICE — CATHETER GUID OTW 0.035 IN 6 FRX135 CM 5 FR TRAILBLAZER

## (undated) DEVICE — SYRINGE MED 10ML LUERLOCK TIP W/O SFTY DISP

## (undated) DEVICE — GUIDEWIRE VASC L150CM DIA0.035IN STR TIP PTFE FIX COR

## (undated) DEVICE — SUTURE PERMAHAND SZ 0 L30IN NONABSORBABLE BLK L26MM SH 1/2 K834H

## (undated) DEVICE — GUIDEWIRE VASC L180CM DIA0.035IN TIP L7CM PTFE S STL STR

## (undated) DEVICE — C-ARM: Brand: UNBRANDED

## (undated) DEVICE — SUTURE MCRYL SZ 4-0 L27IN ABSRB UD L19MM PS-2 1/2 CIR PRIM Y426H

## (undated) DEVICE — RADIFOCUS TORQUE DEVICE MULTI-TORQUE VISE: Brand: RADIFOCUS TORQUE DEVICE

## (undated) DEVICE — ELECTRODE PT RET AD L9FT HI MOIST COND ADH HYDRGEL CORDED

## (undated) DEVICE — SYRINGE MED 50ML LUERLOCK TIP

## (undated) DEVICE — 4FR MICROPUNCTURE KIT STIFFEN

## (undated) DEVICE — STOPCOCK 3-WAY 45 PSI LOW OFF ROT COLAR

## (undated) DEVICE — SUTURE VCRL SZ 2-0 L36IN ABSRB UD L36MM CT-1 1/2 CIR J945H

## (undated) DEVICE — PROVE COVER: Brand: UNBRANDED

## (undated) DEVICE — SYRINGE MED 30ML STD CLR PLAS LUERLOCK TIP N CTRL DISP

## (undated) DEVICE — SUTURE PERMAHAND SZ 3-0 L18IN NONABSORBABLE BLK SILK BRAID A184H

## (undated) DEVICE — SUTURE NONABSORBABLE MONOFILAMENT 6-0 BV-1 1X30 IN PROLENE 8709H

## (undated) DEVICE — APPLICATOR MEDICATED 26 CC SOLUTION HI LT ORNG CHLORAPREP

## (undated) DEVICE — RADIFOCUS GLIDECATH: Brand: GLIDECATH

## (undated) DEVICE — CATHETER ANGIO 4FR L65CM 0.035IN VIS OMNI FLUSH-0 SFT TIP

## (undated) DEVICE — MICROCATHETER: Brand: RENEGADE™ STC 18

## (undated) DEVICE — RETRACTION ASPIRATOR, SINGLE INSERTION: Brand: FLOWTRIEVER

## (undated) DEVICE — SPONGE,DISSECTOR,K,XRAY,9/16"X1/4",STRL: Brand: MEDLINE

## (undated) DEVICE — TTL1LYR 16FR10ML 100%SIL TMPST TR: Brand: MEDLINE

## (undated) DEVICE — SHEET, DRAPE, SPLIT, STERILE: Brand: MEDLINE

## (undated) DEVICE — COVER LT HNDL BLU PLAS

## (undated) DEVICE — APPLICATOR  COTTON-TIPPED 6 IN WOOD STRL

## (undated) DEVICE — GLOVE SURG SZ 8 L12IN FNGR THK79MIL GRN LTX FREE

## (undated) DEVICE — COVER FT SWCH W15XL17IN GRY ALL OEC SYS

## (undated) DEVICE — PERCLOSE PROGLIDE™ SUTURE-MEDIATED CLOSURE SYSTEM: Brand: PERCLOSE PROGLIDE™

## (undated) DEVICE — DECANTER FLD 9IN ST BG FOR ASEP TRNSF OF FLD

## (undated) DEVICE — GLOVE ORANGE PI 7 1/2   MSG9075

## (undated) DEVICE — FLEXOR, CHECK-FLO, INTRODUCER RAABE MODIFICATION: Brand: FLEXOR

## (undated) DEVICE — COPILOT KIT INCLUDES BLEEDBACK CONTROL VALVE / GUIDE WIRE INTRODUCER / TORQUE DEVICE: Brand: ACCESSORIES

## (undated) DEVICE — T-DRAPE,EXTREMITY,STERILE: Brand: MEDLINE

## (undated) DEVICE — SUCKER CARD L9IN 0.25IN CONN MINI RIG SFT TIP W/ SIDE PRT

## (undated) DEVICE — COUNTER NDL 40 COUNT HLD 70 FOAM BLK ADH W/ MAG

## (undated) DEVICE — SHEATH 8FR 11CM BRITETIP

## (undated) DEVICE — SYRINGE IRRIG 60ML SFT PLIABLE BLB EZ TO GRP 1 HND USE W/

## (undated) DEVICE — SHEET,DRAPE,53X77,STERILE: Brand: MEDLINE

## (undated) DEVICE — SUTURE PROL SZ 7-0 L24IN NONABSORBABLE BLU L9.3MM BV-1 3/8 M8702

## (undated) DEVICE — INTRODUCER SHTH 4FR CANN L11CM DIL TIP 25MM RED TUNGSTEN

## (undated) DEVICE — SYRINGE ANGIO 150ML CAP LINDEN LUER HANDI FILL STRW

## (undated) DEVICE — SUTURE PERMAHAND SZ 2-0 L12X18IN NONABSORBABLE BLK SILK A185H

## (undated) DEVICE — SUTURE VCRL SZ 3-0 L36IN ABSRB UD L36MM CT-1 1/2 CIR J944H

## (undated) DEVICE — CATHETER PTCA BLLN L4CM INFL 10-37MM CATH L90CM MOLDING

## (undated) DEVICE — TUBING, SUCTION, 9/32" X 12', STRAIGHT: Brand: MEDLINE INDUSTRIES, INC.

## (undated) DEVICE — DESTINATION PERIPHERAL GUIDING SHEATH: Brand: DESTINATION

## (undated) DEVICE — DRAPE EQUIP CARM 120X42 IN ELASTIC BND CLP ASMBLY CLR DISP

## (undated) DEVICE — SPONGE,LAP,18"X18",DLX,XR,ST,5/PK,40/PK: Brand: MEDLINE

## (undated) DEVICE — RADIFOCUS GLIDEWIRE ADVANTAGE GUIDEWIRE: Brand: GLIDEWIRE ADVANTAGE

## (undated) DEVICE — PACK PROCEDURE SURG SURG CARDIAC CATH

## (undated) DEVICE — MARKER SURG SKIN GENTIAN VLT REG TIP W/ 6IN RUL DYNJSM01

## (undated) DEVICE — CANNULA PERF L2IN BLNT TIP 3MM VES CLR RADPQ BODY FEM LUER D

## (undated) DEVICE — COVER,TABLE,44X90,STERILE: Brand: MEDLINE

## (undated) DEVICE — 3M™ IOBAN™ 2 ANTIMICROBIAL INCISE DRAPE 6651EZ: Brand: IOBAN™ 2

## (undated) DEVICE — Device: Brand: VISIONS PV .018 DIGITAL IVUS CATHETER

## (undated) DEVICE — 3M™ STERI-DRAPE™ INSTRUMENT POUCH 1018: Brand: STERI-DRAPE™

## (undated) DEVICE — BLADE,CARBON-STEEL,10,STRL,DISPOSABLE,TB: Brand: MEDLINE

## (undated) DEVICE — GOWN,SIRUS,NONRNF,SETINSLV,2XL,18/CS: Brand: MEDLINE

## (undated) DEVICE — INTRODUCER SHTH 0.018 IN 4 FRX40 CM KT SFT TIP NIT VSI 7266V

## (undated) DEVICE — PACK,ORTHOPEDIC I: Brand: MEDLINE

## (undated) DEVICE — SYSTEM WND THER 14 DAY 150 CC CANSTR THER UNIT PREVENA + 125

## (undated) DEVICE — LOOP VES W13MM THK09MM MINI RED SIL FLD REPELLENT

## (undated) DEVICE — CATHETER PTCA L135CM BLLN L80MM DIA5MM SHTH 5FR 0.035IN

## (undated) DEVICE — NEPTUNE E-SEP SMOKE EVACUATION PENCIL, COATED, 70MM BLADE, PUSH BUTTON SWITCH: Brand: NEPTUNE E-SEP

## (undated) DEVICE — GAUZE,SPONGE,4"X4",16PLY,XRAY,STRL,LF: Brand: MEDLINE

## (undated) DEVICE — CATHETER VASC DIAG RENAL DBL CRV PERIPH W/O HYDRPHLC COAT

## (undated) DEVICE — FLOSEAL WITH RECOTHROM - 10ML.: Brand: FLOSEAL HEMOSTATIC MATRIX

## (undated) DEVICE — DRAPE EQUIP TRNSPRT CONTAINMENT FOR BK TAB

## (undated) DEVICE — DRAPE,TOP,102X53,STERILE: Brand: MEDLINE

## (undated) DEVICE — INJECTOR CNTRST 48 IN NAMIC

## (undated) DEVICE — DEVICE INFLATION ANGIO 30ATM

## (undated) DEVICE — INTRODUCER SHTH 0.018 IN 4 FRX70 CM 21 GAX7 CM KT MIC VSI

## (undated) DEVICE — CATHETER PTCA L135CM BLLN L200MM DIA5MM INTRO SHTH 5FR

## (undated) DEVICE — TRAILBLAZER L150CM MRK BND SPC 15MM 0.018IN CROSSCOAT

## (undated) DEVICE — CATHETER INFUS 5FR L135X50CM 0.035IN 1 LUMN VLV STD

## (undated) DEVICE — SHEATH INTRO 12FR L33CM OD4.7MM ID4MM HYDRPHLC KINK

## (undated) DEVICE — DEVICE EMBOLIC PROTCT FLTR L7MM GWIRE L320/190CM DIA0.014IN

## (undated) DEVICE — PACK,BASIC: Brand: MEDLINE

## (undated) DEVICE — DRESSING NEG PRSS 13CM PREVENA